# Patient Record
Sex: FEMALE | Race: WHITE | NOT HISPANIC OR LATINO | Employment: UNEMPLOYED | URBAN - METROPOLITAN AREA
[De-identification: names, ages, dates, MRNs, and addresses within clinical notes are randomized per-mention and may not be internally consistent; named-entity substitution may affect disease eponyms.]

---

## 2017-01-16 ENCOUNTER — TELEPHONE (OUTPATIENT)
Dept: FAMILY MEDICINE | Facility: CLINIC | Age: 27
End: 2017-01-16

## 2017-01-16 DIAGNOSIS — F41.8 DEPRESSION WITH ANXIETY: Primary | ICD-10-CM

## 2017-01-16 NOTE — TELEPHONE ENCOUNTER
FLUoxetine (PROZAC) 40 MG capsule     Last Written Prescription Date: 12/1/15  Last Fill Quantity: 90, # refills: 3  Last Office Visit with [facility] primary care provider:  12/1/15        Last PHQ-9 score on record=   PHQ-9 SCORE 10/13/2015   Total Score -   Total Score 10

## 2017-01-17 RX ORDER — FLUOXETINE 40 MG/1
40 CAPSULE ORAL DAILY
Qty: 30 CAPSULE | Refills: 0 | Status: ON HOLD | OUTPATIENT
Start: 2017-01-17 | End: 2024-05-29

## 2017-01-17 NOTE — TELEPHONE ENCOUNTER
Message left on home number for patient to call back TC line.  NTBS for physical/Pap and depression check with provider.    Page KINCAID

## 2017-01-17 NOTE — TELEPHONE ENCOUNTER
Annual exam advised in generic patient instructions at 12/1/15 visit.    Nothing scheduled, needs to be seen.    Routing refill request to provider for review/approval because:  Elevated PHQ9  [facility] RN refill protocol only allows RN refill for up to 6 mos from last related office visit.    Kori Funes, RN  [facility]

## 2017-01-24 NOTE — TELEPHONE ENCOUNTER
Spoke with patient and informed.  She will call back.  She got  in Sept and is waiting to get her insurance card (from 's employer) and will call to schedule then.

## 2024-05-29 ENCOUNTER — APPOINTMENT (OUTPATIENT)
Dept: GENERAL RADIOLOGY | Facility: CLINIC | Age: 34
DRG: 101 | End: 2024-05-29
Attending: EMERGENCY MEDICINE

## 2024-05-29 ENCOUNTER — APPOINTMENT (OUTPATIENT)
Dept: CT IMAGING | Facility: CLINIC | Age: 34
DRG: 101 | End: 2024-05-29
Attending: EMERGENCY MEDICINE

## 2024-05-29 ENCOUNTER — HOSPITAL ENCOUNTER (INPATIENT)
Facility: CLINIC | Age: 34
LOS: 2 days | Discharge: HOME | DRG: 101 | End: 2024-05-31
Attending: EMERGENCY MEDICINE | Admitting: HOSPITALIST

## 2024-05-29 DIAGNOSIS — R56.9 ALCOHOL WITHDRAWAL SEIZURE WITHOUT COMPLICATION (H): ICD-10-CM

## 2024-05-29 DIAGNOSIS — F10.930 ALCOHOL WITHDRAWAL SEIZURE WITHOUT COMPLICATION (H): ICD-10-CM

## 2024-05-29 LAB
ALBUMIN SERPL BCG-MCNC: 4.5 G/DL (ref 3.5–5.2)
ALP SERPL-CCNC: 91 U/L (ref 40–150)
ALT SERPL W P-5'-P-CCNC: 90 U/L (ref 0–50)
ANION GAP SERPL CALCULATED.3IONS-SCNC: 27 MMOL/L (ref 7–15)
AST SERPL W P-5'-P-CCNC: 207 U/L (ref 0–45)
ATRIAL RATE - MUSE: 85 BPM
BASOPHILS # BLD AUTO: 0 10E3/UL (ref 0–0.2)
BASOPHILS NFR BLD AUTO: 0 %
BILIRUB DIRECT SERPL-MCNC: 0.24 MG/DL (ref 0–0.3)
BILIRUB SERPL-MCNC: 1 MG/DL
BUN SERPL-MCNC: 12.2 MG/DL (ref 6–20)
CALCIUM SERPL-MCNC: 9.5 MG/DL (ref 8.6–10)
CHLORIDE SERPL-SCNC: 87 MMOL/L (ref 98–107)
CREAT SERPL-MCNC: 0.55 MG/DL (ref 0.51–0.95)
DEPRECATED HCO3 PLAS-SCNC: 18 MMOL/L (ref 22–29)
DIASTOLIC BLOOD PRESSURE - MUSE: NORMAL MMHG
EGFRCR SERPLBLD CKD-EPI 2021: >90 ML/MIN/1.73M2
EOSINOPHIL # BLD AUTO: 0 10E3/UL (ref 0–0.7)
EOSINOPHIL NFR BLD AUTO: 0 %
ERYTHROCYTE [DISTWIDTH] IN BLOOD BY AUTOMATED COUNT: 12.1 % (ref 10–15)
ETHANOL SERPL-MCNC: <0.01 G/DL
GLUCOSE BLDC GLUCOMTR-MCNC: 79 MG/DL (ref 70–99)
GLUCOSE BLDC GLUCOMTR-MCNC: 81 MG/DL (ref 70–99)
GLUCOSE BLDC GLUCOMTR-MCNC: 97 MG/DL (ref 70–99)
GLUCOSE SERPL-MCNC: 215 MG/DL (ref 70–99)
HCT VFR BLD AUTO: 42.4 % (ref 35–47)
HGB BLD-MCNC: 14.2 G/DL (ref 11.7–15.7)
IMM GRANULOCYTES # BLD: 0.1 10E3/UL
IMM GRANULOCYTES NFR BLD: 1 %
INTERPRETATION ECG - MUSE: NORMAL
LYMPHOCYTES # BLD AUTO: 0.6 10E3/UL (ref 0.8–5.3)
LYMPHOCYTES NFR BLD AUTO: 6 %
MAGNESIUM SERPL-MCNC: 1.7 MG/DL (ref 1.7–2.3)
MAGNESIUM SERPL-MCNC: 1.9 MG/DL (ref 1.7–2.3)
MCH RBC QN AUTO: 31.9 PG (ref 26.5–33)
MCHC RBC AUTO-ENTMCNC: 33.5 G/DL (ref 31.5–36.5)
MCV RBC AUTO: 95 FL (ref 78–100)
MONOCYTES # BLD AUTO: 0.5 10E3/UL (ref 0–1.3)
MONOCYTES NFR BLD AUTO: 5 %
NEUTROPHILS # BLD AUTO: 9.1 10E3/UL (ref 1.6–8.3)
NEUTROPHILS NFR BLD AUTO: 88 %
NRBC # BLD AUTO: 0 10E3/UL
NRBC BLD AUTO-RTO: 0 /100
P AXIS - MUSE: 70 DEGREES
PHOSPHATE SERPL-MCNC: 3 MG/DL (ref 2.5–4.5)
PHOSPHATE SERPL-MCNC: 4.2 MG/DL (ref 2.5–4.5)
PLATELET # BLD AUTO: 141 10E3/UL (ref 150–450)
POTASSIUM SERPL-SCNC: 3.1 MMOL/L (ref 3.4–5.3)
PR INTERVAL - MUSE: 130 MS
PROT SERPL-MCNC: 8 G/DL (ref 6.4–8.3)
QRS DURATION - MUSE: 86 MS
QT - MUSE: 408 MS
QTC - MUSE: 485 MS
R AXIS - MUSE: 71 DEGREES
RBC # BLD AUTO: 4.45 10E6/UL (ref 3.8–5.2)
SODIUM SERPL-SCNC: 132 MMOL/L (ref 135–145)
SYSTOLIC BLOOD PRESSURE - MUSE: NORMAL MMHG
T AXIS - MUSE: 51 DEGREES
TSH SERPL DL<=0.005 MIU/L-ACNC: 3.75 UIU/ML (ref 0.3–4.2)
VENTRICULAR RATE- MUSE: 85 BPM
WBC # BLD AUTO: 10.3 10E3/UL (ref 4–11)

## 2024-05-29 PROCEDURE — 83735 ASSAY OF MAGNESIUM: CPT | Performed by: EMERGENCY MEDICINE

## 2024-05-29 PROCEDURE — 99285 EMERGENCY DEPT VISIT HI MDM: CPT | Mod: 25

## 2024-05-29 PROCEDURE — 83735 ASSAY OF MAGNESIUM: CPT | Performed by: HOSPITALIST

## 2024-05-29 PROCEDURE — 84443 ASSAY THYROID STIM HORMONE: CPT | Performed by: EMERGENCY MEDICINE

## 2024-05-29 PROCEDURE — 70450 CT HEAD/BRAIN W/O DYE: CPT

## 2024-05-29 PROCEDURE — 250N000009 HC RX 250: Performed by: HOSPITALIST

## 2024-05-29 PROCEDURE — 36415 COLL VENOUS BLD VENIPUNCTURE: CPT | Performed by: EMERGENCY MEDICINE

## 2024-05-29 PROCEDURE — 250N000011 HC RX IP 250 OP 636: Performed by: EMERGENCY MEDICINE

## 2024-05-29 PROCEDURE — 96365 THER/PROPH/DIAG IV INF INIT: CPT

## 2024-05-29 PROCEDURE — 82247 BILIRUBIN TOTAL: CPT | Performed by: NURSE PRACTITIONER

## 2024-05-29 PROCEDURE — 250N000011 HC RX IP 250 OP 636: Performed by: HOSPITALIST

## 2024-05-29 PROCEDURE — 82077 ASSAY SPEC XCP UR&BREATH IA: CPT | Performed by: EMERGENCY MEDICINE

## 2024-05-29 PROCEDURE — 80069 RENAL FUNCTION PANEL: CPT | Performed by: EMERGENCY MEDICINE

## 2024-05-29 PROCEDURE — 93005 ELECTROCARDIOGRAM TRACING: CPT

## 2024-05-29 PROCEDURE — 250N000013 HC RX MED GY IP 250 OP 250 PS 637: Performed by: HOSPITALIST

## 2024-05-29 PROCEDURE — 73030 X-RAY EXAM OF SHOULDER: CPT | Mod: RT

## 2024-05-29 PROCEDURE — 84100 ASSAY OF PHOSPHORUS: CPT | Performed by: EMERGENCY MEDICINE

## 2024-05-29 PROCEDURE — 36415 COLL VENOUS BLD VENIPUNCTURE: CPT | Performed by: HOSPITALIST

## 2024-05-29 PROCEDURE — 200N000001 HC R&B ICU

## 2024-05-29 PROCEDURE — 99222 1ST HOSP IP/OBS MODERATE 55: CPT | Performed by: HOSPITALIST

## 2024-05-29 PROCEDURE — 85025 COMPLETE CBC W/AUTO DIFF WBC: CPT | Performed by: EMERGENCY MEDICINE

## 2024-05-29 PROCEDURE — 258N000003 HC RX IP 258 OP 636: Performed by: EMERGENCY MEDICINE

## 2024-05-29 PROCEDURE — 96367 TX/PROPH/DG ADDL SEQ IV INF: CPT

## 2024-05-29 PROCEDURE — 250N000013 HC RX MED GY IP 250 OP 250 PS 637: Performed by: EMERGENCY MEDICINE

## 2024-05-29 PROCEDURE — 96375 TX/PRO/DX INJ NEW DRUG ADDON: CPT

## 2024-05-29 PROCEDURE — 258N000003 HC RX IP 258 OP 636: Performed by: HOSPITALIST

## 2024-05-29 PROCEDURE — 84100 ASSAY OF PHOSPHORUS: CPT | Performed by: HOSPITALIST

## 2024-05-29 RX ORDER — TRAZODONE HYDROCHLORIDE 50 MG/1
50-100 TABLET, FILM COATED ORAL
COMMUNITY

## 2024-05-29 RX ORDER — DEXTROSE MONOHYDRATE, SODIUM CHLORIDE, AND POTASSIUM CHLORIDE 50; 1.49; 9 G/1000ML; G/1000ML; G/1000ML
INJECTION, SOLUTION INTRAVENOUS CONTINUOUS
Status: DISCONTINUED | OUTPATIENT
Start: 2024-05-29 | End: 2024-05-31

## 2024-05-29 RX ORDER — FOLIC ACID 1 MG/1
1 TABLET ORAL DAILY
Status: DISCONTINUED | OUTPATIENT
Start: 2024-05-30 | End: 2024-05-31 | Stop reason: HOSPADM

## 2024-05-29 RX ORDER — GABAPENTIN 300 MG/1
600 CAPSULE ORAL EVERY 8 HOURS
Status: DISCONTINUED | OUTPATIENT
Start: 2024-06-01 | End: 2024-05-30

## 2024-05-29 RX ORDER — GABAPENTIN 300 MG/1
300 CAPSULE ORAL EVERY 8 HOURS
Status: DISCONTINUED | OUTPATIENT
Start: 2024-06-03 | End: 2024-05-30

## 2024-05-29 RX ORDER — LORAZEPAM 2 MG/ML
INJECTION INTRAMUSCULAR
Status: DISCONTINUED
Start: 2024-05-29 | End: 2024-05-29 | Stop reason: WASHOUT

## 2024-05-29 RX ORDER — FLUMAZENIL 0.1 MG/ML
0.2 INJECTION, SOLUTION INTRAVENOUS
Status: DISCONTINUED | OUTPATIENT
Start: 2024-05-29 | End: 2024-05-31 | Stop reason: HOSPADM

## 2024-05-29 RX ORDER — GABAPENTIN 100 MG/1
100 CAPSULE ORAL EVERY 8 HOURS
Status: DISCONTINUED | OUTPATIENT
Start: 2024-06-05 | End: 2024-05-30

## 2024-05-29 RX ORDER — LORAZEPAM 1 MG/1
1-2 TABLET ORAL EVERY 30 MIN PRN
Status: DISCONTINUED | OUTPATIENT
Start: 2024-05-29 | End: 2024-05-31 | Stop reason: HOSPADM

## 2024-05-29 RX ORDER — ONDANSETRON 2 MG/ML
4 INJECTION INTRAMUSCULAR; INTRAVENOUS EVERY 6 HOURS PRN
Status: DISCONTINUED | OUTPATIENT
Start: 2024-05-29 | End: 2024-05-31 | Stop reason: HOSPADM

## 2024-05-29 RX ORDER — OLANZAPINE 5 MG/1
5-10 TABLET, ORALLY DISINTEGRATING ORAL EVERY 6 HOURS PRN
Status: DISCONTINUED | OUTPATIENT
Start: 2024-05-29 | End: 2024-05-31 | Stop reason: HOSPADM

## 2024-05-29 RX ORDER — CLONIDINE HYDROCHLORIDE 0.1 MG/1
0.1 TABLET ORAL EVERY 8 HOURS
Status: DISCONTINUED | OUTPATIENT
Start: 2024-05-29 | End: 2024-05-29

## 2024-05-29 RX ORDER — OLANZAPINE 5 MG/1
5-10 TABLET, ORALLY DISINTEGRATING ORAL EVERY 6 HOURS PRN
Status: DISCONTINUED | OUTPATIENT
Start: 2024-05-29 | End: 2024-05-29

## 2024-05-29 RX ORDER — LEVOTHYROXINE SODIUM 75 UG/1
75 TABLET ORAL DAILY
Status: DISCONTINUED | OUTPATIENT
Start: 2024-05-29 | End: 2024-05-31 | Stop reason: HOSPADM

## 2024-05-29 RX ORDER — LORAZEPAM 2 MG/ML
1-2 INJECTION INTRAMUSCULAR EVERY 30 MIN PRN
Status: DISCONTINUED | OUTPATIENT
Start: 2024-05-29 | End: 2024-05-31 | Stop reason: HOSPADM

## 2024-05-29 RX ORDER — ALBUTEROL SULFATE 90 UG/1
2 AEROSOL, METERED RESPIRATORY (INHALATION) EVERY 6 HOURS PRN
COMMUNITY

## 2024-05-29 RX ORDER — DIAZEPAM 10 MG/2ML
5-10 INJECTION, SOLUTION INTRAMUSCULAR; INTRAVENOUS EVERY 30 MIN PRN
Status: DISCONTINUED | OUTPATIENT
Start: 2024-05-29 | End: 2024-05-29

## 2024-05-29 RX ORDER — GABAPENTIN 600 MG/1
1200 TABLET ORAL ONCE
Status: COMPLETED | OUTPATIENT
Start: 2024-05-29 | End: 2024-05-29

## 2024-05-29 RX ORDER — CLONIDINE HYDROCHLORIDE 0.1 MG/1
0.1 TABLET ORAL EVERY 8 HOURS
Status: DISCONTINUED | OUTPATIENT
Start: 2024-05-29 | End: 2024-05-30

## 2024-05-29 RX ORDER — HALOPERIDOL 5 MG/ML
2.5-5 INJECTION INTRAMUSCULAR EVERY 6 HOURS PRN
Status: DISCONTINUED | OUTPATIENT
Start: 2024-05-29 | End: 2024-05-29

## 2024-05-29 RX ORDER — GABAPENTIN 300 MG/1
900 CAPSULE ORAL EVERY 8 HOURS
Status: DISCONTINUED | OUTPATIENT
Start: 2024-05-29 | End: 2024-05-29

## 2024-05-29 RX ORDER — NICOTINE POLACRILEX 4 MG
15-30 LOZENGE BUCCAL
Status: DISCONTINUED | OUTPATIENT
Start: 2024-05-29 | End: 2024-05-31 | Stop reason: HOSPADM

## 2024-05-29 RX ORDER — LEVETIRACETAM 10 MG/ML
1000 INJECTION INTRAVASCULAR ONCE
Status: COMPLETED | OUTPATIENT
Start: 2024-05-29 | End: 2024-05-29

## 2024-05-29 RX ORDER — FOLIC ACID 1 MG/1
1 TABLET ORAL DAILY
Status: DISCONTINUED | OUTPATIENT
Start: 2024-05-29 | End: 2024-05-29

## 2024-05-29 RX ORDER — FLUMAZENIL 0.1 MG/ML
0.2 INJECTION, SOLUTION INTRAVENOUS
Status: DISCONTINUED | OUTPATIENT
Start: 2024-05-29 | End: 2024-05-29

## 2024-05-29 RX ORDER — ONDANSETRON 4 MG/1
4 TABLET, ORALLY DISINTEGRATING ORAL EVERY 6 HOURS PRN
Status: DISCONTINUED | OUTPATIENT
Start: 2024-05-29 | End: 2024-05-31 | Stop reason: HOSPADM

## 2024-05-29 RX ORDER — DIAZEPAM 10 MG
10 TABLET ORAL EVERY 30 MIN PRN
Status: DISCONTINUED | OUTPATIENT
Start: 2024-05-29 | End: 2024-05-29

## 2024-05-29 RX ORDER — GABAPENTIN 300 MG/1
900 CAPSULE ORAL EVERY 8 HOURS
Status: DISCONTINUED | OUTPATIENT
Start: 2024-05-29 | End: 2024-05-30

## 2024-05-29 RX ORDER — GABAPENTIN 100 MG/1
100 CAPSULE ORAL EVERY 8 HOURS
Status: DISCONTINUED | OUTPATIENT
Start: 2024-06-05 | End: 2024-05-29

## 2024-05-29 RX ORDER — POTASSIUM CHLORIDE 7.45 MG/ML
10 INJECTION INTRAVENOUS ONCE
Status: COMPLETED | OUTPATIENT
Start: 2024-05-29 | End: 2024-05-29

## 2024-05-29 RX ORDER — DEXTROSE MONOHYDRATE 25 G/50ML
25-50 INJECTION, SOLUTION INTRAVENOUS
Status: DISCONTINUED | OUTPATIENT
Start: 2024-05-29 | End: 2024-05-31 | Stop reason: HOSPADM

## 2024-05-29 RX ORDER — LEVOTHYROXINE SODIUM 75 UG/1
75 TABLET ORAL DAILY
COMMUNITY

## 2024-05-29 RX ORDER — GABAPENTIN 300 MG/1
600 CAPSULE ORAL EVERY 8 HOURS
Status: DISCONTINUED | OUTPATIENT
Start: 2024-06-01 | End: 2024-05-29

## 2024-05-29 RX ORDER — HALOPERIDOL 5 MG/ML
2.5-5 INJECTION INTRAMUSCULAR EVERY 6 HOURS PRN
Status: DISCONTINUED | OUTPATIENT
Start: 2024-05-29 | End: 2024-05-31 | Stop reason: HOSPADM

## 2024-05-29 RX ORDER — GABAPENTIN 300 MG/1
300 CAPSULE ORAL EVERY 8 HOURS
Status: DISCONTINUED | OUTPATIENT
Start: 2024-06-03 | End: 2024-05-29

## 2024-05-29 RX ADMIN — CLONIDINE HYDROCHLORIDE 0.1 MG: 0.1 TABLET ORAL at 13:57

## 2024-05-29 RX ADMIN — GABAPENTIN 900 MG: 300 CAPSULE ORAL at 23:16

## 2024-05-29 RX ADMIN — FOLIC ACID: 5 INJECTION, SOLUTION INTRAMUSCULAR; INTRAVENOUS; SUBCUTANEOUS at 14:56

## 2024-05-29 RX ADMIN — LEVOTHYROXINE SODIUM 75 MCG: 0.07 TABLET ORAL at 16:47

## 2024-05-29 RX ADMIN — POTASSIUM CHLORIDE 10 MEQ: 7.46 INJECTION, SOLUTION INTRAVENOUS at 07:39

## 2024-05-29 RX ADMIN — CLONIDINE HYDROCHLORIDE 0.1 MG: 0.1 TABLET ORAL at 07:39

## 2024-05-29 RX ADMIN — GABAPENTIN 1200 MG: 600 TABLET, FILM COATED ORAL at 06:45

## 2024-05-29 RX ADMIN — CLONIDINE HYDROCHLORIDE 0.1 MG: 0.1 TABLET ORAL at 22:54

## 2024-05-29 RX ADMIN — FOLIC ACID 1 MG: 1 TABLET ORAL at 07:39

## 2024-05-29 RX ADMIN — LEVETIRACETAM 1000 MG: 10 INJECTION INTRAVENOUS at 06:50

## 2024-05-29 RX ADMIN — LORAZEPAM 1 MG: 1 TABLET ORAL at 23:16

## 2024-05-29 RX ADMIN — THIAMINE HCL TAB 100 MG 100 MG: 100 TAB at 07:39

## 2024-05-29 RX ADMIN — DIAZEPAM 5 MG: 5 INJECTION INTRAMUSCULAR; INTRAVENOUS at 06:46

## 2024-05-29 RX ADMIN — GABAPENTIN 900 MG: 300 CAPSULE ORAL at 15:54

## 2024-05-29 RX ADMIN — SERTRALINE HYDROCHLORIDE 50 MG: 50 TABLET ORAL at 16:47

## 2024-05-29 RX ADMIN — SODIUM CHLORIDE 1000 ML: 9 INJECTION, SOLUTION INTRAVENOUS at 06:39

## 2024-05-29 ASSESSMENT — LIFESTYLE VARIABLES
TREMOR: NO TREMOR
NAUSEA AND VOMITING: NO NAUSEA AND NO VOMITING
TOTAL SCORE: 3
VISUAL DISTURBANCES: NOT PRESENT
ANXIETY: NO ANXIETY, AT EASE
NAUSEA AND VOMITING: NO NAUSEA AND NO VOMITING
VISUAL DISTURBANCES: NOT PRESENT
AUDITORY DISTURBANCES: NOT PRESENT
ORIENTATION AND CLOUDING OF SENSORIUM: CANNOT DO SERIAL ADDITIONS OR IS UNCERTAIN ABOUT DATE
VISUAL DISTURBANCES: NOT PRESENT
ORIENTATION AND CLOUDING OF SENSORIUM: CANNOT DO SERIAL ADDITIONS OR IS UNCERTAIN ABOUT DATE
AUDITORY DISTURBANCES: NOT PRESENT
NAUSEA AND VOMITING: NO NAUSEA AND NO VOMITING
ANXIETY: NO ANXIETY, AT EASE
NAUSEA AND VOMITING: NO NAUSEA AND NO VOMITING
AUDITORY DISTURBANCES: NOT PRESENT
VISUAL DISTURBANCES: NOT PRESENT
PAROXYSMAL SWEATS: NO SWEAT VISIBLE
PAROXYSMAL SWEATS: NO SWEAT VISIBLE
TREMOR: NOT VISIBLE, BUT CAN BE FELT FINGERTIP TO FINGERTIP
TOTAL SCORE: 2
AUDITORY DISTURBANCES: NOT PRESENT
VISUAL DISTURBANCES: NOT PRESENT
PAROXYSMAL SWEATS: NO SWEAT VISIBLE
ANXIETY: NO ANXIETY, AT EASE
AGITATION: NORMAL ACTIVITY
TREMOR: 2
HEADACHE, FULLNESS IN HEAD: NOT PRESENT
ANXIETY: NO ANXIETY, AT EASE
PAROXYSMAL SWEATS: NO SWEAT VISIBLE
VISUAL DISTURBANCES: NOT PRESENT
HEADACHE, FULLNESS IN HEAD: NOT PRESENT
TOTAL SCORE: 2
VISUAL DISTURBANCES: NOT PRESENT
PAROXYSMAL SWEATS: NO SWEAT VISIBLE
TOTAL SCORE: 2
AUDITORY DISTURBANCES: NOT PRESENT
AGITATION: NORMAL ACTIVITY
ORIENTATION AND CLOUDING OF SENSORIUM: CANNOT DO SERIAL ADDITIONS OR IS UNCERTAIN ABOUT DATE
PAROXYSMAL SWEATS: NO SWEAT VISIBLE
NAUSEA AND VOMITING: NO NAUSEA AND NO VOMITING
TREMOR: NOT VISIBLE, BUT CAN BE FELT FINGERTIP TO FINGERTIP
HEADACHE, FULLNESS IN HEAD: NOT PRESENT
ANXIETY: NO ANXIETY, AT EASE
AUDITORY DISTURBANCES: NOT PRESENT
TOTAL SCORE: 2
AGITATION: NORMAL ACTIVITY
AGITATION: NORMAL ACTIVITY
TOTAL SCORE: 5
NAUSEA AND VOMITING: NO NAUSEA AND NO VOMITING
TREMOR: 2
TREMOR: NOT VISIBLE, BUT CAN BE FELT FINGERTIP TO FINGERTIP
VISUAL DISTURBANCES: NOT PRESENT
PAROXYSMAL SWEATS: NO SWEAT VISIBLE
ANXIETY: NO ANXIETY, AT EASE
AGITATION: NORMAL ACTIVITY
ORIENTATION AND CLOUDING OF SENSORIUM: ORIENTED AND CAN DO SERIAL ADDITIONS
ORIENTATION AND CLOUDING OF SENSORIUM: CANNOT DO SERIAL ADDITIONS OR IS UNCERTAIN ABOUT DATE
AGITATION: NORMAL ACTIVITY
ORIENTATION AND CLOUDING OF SENSORIUM: CANNOT DO SERIAL ADDITIONS OR IS UNCERTAIN ABOUT DATE
TREMOR: NOT VISIBLE, BUT CAN BE FELT FINGERTIP TO FINGERTIP
AGITATION: NORMAL ACTIVITY
HEADACHE, FULLNESS IN HEAD: NOT PRESENT
ORIENTATION AND CLOUDING OF SENSORIUM: CANNOT DO SERIAL ADDITIONS OR IS UNCERTAIN ABOUT DATE
ORIENTATION AND CLOUDING OF SENSORIUM: CANNOT DO SERIAL ADDITIONS OR IS UNCERTAIN ABOUT DATE
VISUAL DISTURBANCES: NOT PRESENT
ORIENTATION AND CLOUDING OF SENSORIUM: CANNOT DO SERIAL ADDITIONS OR IS UNCERTAIN ABOUT DATE
AUDITORY DISTURBANCES: NOT PRESENT
TREMOR: 2
NAUSEA AND VOMITING: NO NAUSEA AND NO VOMITING
TREMOR: NOT VISIBLE, BUT CAN BE FELT FINGERTIP TO FINGERTIP
HEADACHE, FULLNESS IN HEAD: NOT PRESENT
NAUSEA AND VOMITING: NO NAUSEA AND NO VOMITING
PAROXYSMAL SWEATS: NO SWEAT VISIBLE
TOTAL SCORE: 2
AGITATION: NORMAL ACTIVITY
NAUSEA AND VOMITING: NO NAUSEA AND NO VOMITING
VISUAL DISTURBANCES: NOT PRESENT
AGITATION: NORMAL ACTIVITY
HEADACHE, FULLNESS IN HEAD: NOT PRESENT
NAUSEA AND VOMITING: NO NAUSEA AND NO VOMITING
AUDITORY DISTURBANCES: NOT PRESENT
ANXIETY: NO ANXIETY, AT EASE
AUDITORY DISTURBANCES: NOT PRESENT
ANXIETY: NO ANXIETY, AT EASE
ANXIETY: NO ANXIETY, AT EASE
TOTAL SCORE: 3
HEADACHE, FULLNESS IN HEAD: NOT PRESENT
ORIENTATION AND CLOUDING OF SENSORIUM: CANNOT DO SERIAL ADDITIONS OR IS UNCERTAIN ABOUT DATE
AUDITORY DISTURBANCES: MODERATE HARSHNESS OR ABILITY TO FRIGHTEN
TOTAL SCORE: 2
PAROXYSMAL SWEATS: NO SWEAT VISIBLE
ANXIETY: NO ANXIETY, AT EASE
PAROXYSMAL SWEATS: NO SWEAT VISIBLE
TOTAL SCORE: 1
AGITATION: NORMAL ACTIVITY
TREMOR: NOT VISIBLE, BUT CAN BE FELT FINGERTIP TO FINGERTIP
HEADACHE, FULLNESS IN HEAD: NOT PRESENT

## 2024-05-29 ASSESSMENT — ACTIVITIES OF DAILY LIVING (ADL)
ADLS_ACUITY_SCORE: 34
ADLS_ACUITY_SCORE: 35
ADLS_ACUITY_SCORE: 35
ADLS_ACUITY_SCORE: 20
ADLS_ACUITY_SCORE: 34
ADLS_ACUITY_SCORE: 35
ADLS_ACUITY_SCORE: 34
ADLS_ACUITY_SCORE: 35
ADLS_ACUITY_SCORE: 35
ADLS_ACUITY_SCORE: 20
ADLS_ACUITY_SCORE: 20
ADLS_ACUITY_SCORE: 34
ADLS_ACUITY_SCORE: 35
ADLS_ACUITY_SCORE: 33

## 2024-05-29 NOTE — ED TRIAGE NOTES
Patient arrived and required help out of car and onto emergency room bed. Patient had seizure this morning 0530 and another on the way to the emergency room around 0610. Patient appears post ictal. Blood noted in nostrils. Bruises on right shoulder and elbow noted. Patient reports no alcohol this morning.      Triage Assessment (Adult)       Row Name 05/29/24 0621          Triage Assessment    Airway WDL WDL        Respiratory WDL    Respiratory WDL WDL        Skin Circulation/Temperature WDL    Skin Circulation/Temperature WDL X  right shoulder and right elbow bruise        Cardiac WDL    Cardiac WDL WDL     Cardiac Rhythm NSR        Peripheral/Neurovascular WDL    Peripheral Neurovascular WDL WDL        Cognitive/Neuro/Behavioral WDL    Cognitive/Neuro/Behavioral WDL X  Patient oriented but appears post ictal.

## 2024-05-29 NOTE — H&P
[facility]    History and Physical  Hospitalist       Date of Admission:  5/29/2024    Assessment & Plan   Carley Vazquez is a 33 year old female with a past medical history of anxiety/depression who presents with multiple seizures.    #Seizures x3.  Alcohol use disorder with withdrawal: Patient's brother was called by his other sibling over the weekend stating that Carley was not doing well.  She is known to have increased her drinking as of late.  She reportedly got  last year and there were some increased stressors related to this issue.  Patient's brother went to see the patient on Monday, 5/27.  She was reportedly intoxicated.  Brother notes that she had multiple bottles with vodka in it.  She came with him to his house.  He reports that he was tremulous through the day on Tuesday.  Patient was seen at around 5 AM and having seizure activity.  Brother describes it as her being stiff.  Lasted about 3 minutes.  She did come to and at that point was agreeable to be brought to the ER.  En route to the ER, patient reportedly had another seizure that lasted again 3 minutes.  She was somnolent by the time she arrived in the ER.  When she was in the ER she did start to talk a bit but then had another seizure.  Patient denies any current pain.  She tells me she is feeling better.  No chest pain or shortness of breath.  No recent illness.  She only tells me that she drinks 1-2 drinks per day.  -ER, patient afebrile nontachycardic.  Normotensive.  Saturating okay on room air.  Her CBC showed mild thrombocytopenia.  BMP with mild hyponatremia and hypokalemia.  LFTs elevated in a manner consistent with alcohol use.  EKG done that did show sinus rhythm with QTc 485.  CT head without acute intracranial abnormality.  X-ray of the shoulder was also done negative for fracture.    Patient was given 1 L of IV fluid, 1 g of IV Keppra along with 10 mg of IV Valium.  He was also given 1200 mg of  gabapentin.  -We will admit to ICU given seizures x 3 this AM for closer clinical monitoring.  -Treat for alcohol withdrawal with gabapentin taper, clonidine with hold parameters as pt HTN, tachycardic.  Thiamine and folic acid.  Prn lorazepam.  -Pt received keppra IV in the ED.  Will hold further doses of keppra as suspect this is more related to alcohol use.  Will consult neurology for opinion.    #Thrombocytopenia secondary to alcohol use: Monitor  #Elevation of LFT's secondary to alcohol use: Monitor. Cessation advised.   #Hyponatremia, hypokalemia: IVF with replacement  #Hypothyroidism: Continue home LT4 once verified.   #Anxiety: Continue home fluoxetine once verified    DVT Prophylaxis: Pneumatic Compression Devices  Code Status: Full Code  Dispo: Admit to ICU    Tim Marquez MD    Primary Care Physician   David Palacios    Chief Complaint   Seizures.     History is obtained from the patient, patient's brother, patient's chart and discussed with ER physician    History of Present Illness   Carley Vazquez is a 33 year old female with a past medical history of anxiety/depression who presents with multiple seizures.    Patient's brother was called by his other sibling over the weekend stating that Carley was not doing well.  She is known to have increased her drinking as of late.  She reportedly got  last year and there were some increased stressors related to this issue.  Patient's brother went to see the patient on Monday, 5/27.  She was reportedly intoxicated.  Brother notes that she had multiple bottles with vodka in it.  She came with him to his house.  He reports that he was tremulous through the day on Tuesday.  Patient was seen at around 5 AM and having seizure activity.  Brother describes it as her being stiff.  Lasted about 3 minutes.  She did come to and at that point was agreeable to be brought to the ER.  En route to the ER, patient reportedly had another seizure that lasted again 3  minutes.  She was somnolent by the time she arrived in the ER.  When she was in the ER she did start to talk a bit but then had another seizure.    In the ER, patient afebrile nontachycardic.  Normotensive.  Saturating okay on room air.  Her CBC showed mild thrombocytopenia.  BMP with mild hyponatremia and hypokalemia.  LFTs elevated in a manner consistent with alcohol use.  EKG done that did show sinus rhythm with QTc 485.  CT head without acute intracranial abnormality.  X-ray of the shoulder was also done negative for fracture.    Patient was given 1 L of IV fluid, 1 g of IV Keppra along with 10 mg of IV Valium.  He was also given 1200 mg of gabapentin.    Past Medical History    I have reviewed this patient's medical history and updated it with pertinent information if needed.   Past Medical History:   Diagnosis Date    NO ACTIVE PROBLEMS        Past Surgical History   I have reviewed this patient's surgical history and updated it with pertinent information if needed.  Past Surgical History:   Procedure Laterality Date    APPENDECTOMY OPEN      in 2nd grade    wisdom teeth  2009       Prior to Admission Medications   Prior to Admission Medications   Prescriptions Last Dose Informant Patient Reported? Taking?   ALPRAZolam (XANAX) 0.25 MG tablet   No No   Sig: Take 1 tablet (0.25 mg) by mouth 3 times daily as needed for anxiety   FLUoxetine (PROZAC) 40 MG capsule   No No   Sig: Take 1 capsule (40 mg) by mouth daily   azithromycin (ZITHROMAX) 250 MG tablet   No No   Sig: Two tablets first day, then one tablet daily for four days.   levonorgestrel-ethinyl estradiol (AVIANE,ALESSE,LESSINA) 0.1-20 MG-MCG per tablet   No No   Sig: Take 1 tablet by mouth daily 11/15/16: needs office visit for refills      Facility-Administered Medications: None     Allergies   Allergies   Allergen Reactions    Bromine      rash    Nickel      rash       Social History   I have reviewed this patient's social history and updated it with  pertinent information if needed. Carley Vazquez  reports that she has never smoked. She has never used smokeless tobacco. She reports current alcohol use. She reports that she does not use drugs.    Family History   I have reviewed this patient's family history and updated it with pertinent information if needed.   Family History   Problem Relation Age of Onset    Respiratory Mother         emphysema    Diabetes No family hx of     Breast Cancer No family hx of     Cancer - colorectal No family hx of        Review of Systems   The 10 point Review of Systems is negative other than noted in the HPI or here.     Physical Exam   Temp: 98.5  F (36.9  C) Temp src: Oral BP: (!) 151/106 Pulse: 101   Resp: 20 SpO2: 94 % O2 Device: None (Room air)    Vital Signs with Ranges  Temp:  [98.5  F (36.9  C)] 98.5  F (36.9  C)  Pulse:  [101] 101  Resp:  [20] 20  BP: (151)/(106) 151/106  SpO2:  [94 %] 94 %  130 lbs 0 oz    Constitutional: Somnolent but arouses to voice.  She is able to answer simple yes/no questions.    HEENT: Normocephalic, membranes are dry, no elevation of JVD.  Respiratory: Nl WOB, Clear bilaterally, No wheezes, no crackles  Cardiovascular: Regular, no murmur  GI: BS+, NT, ND, no rebound or guarding  Lymph/Hematologic: Scattered bruising noted.  No cervical LAD  Skin: No rash  Musculoskeletal: Nl Tone, No edema noted  Neurologic: Somnolent but awakens to voice.  Cranial nerves are all intact.  She moves all extremities.  She does not have active tremors at the moment.  Psychiatric: Calm    Data   Data reviewed today:  I personally reviewed   Recent Labs   Lab 05/29/24  0631   WBC 10.3   HGB 14.2   MCV 95   *   *   POTASSIUM 3.1*   CHLORIDE 87*   CO2 18*   BUN 12.2   CR 0.55   ANIONGAP 27*   MAHSA 9.5   *       No results found for this or any previous visit (from the past 24 hour(s)).    Clinically Significant Risk Factors Present on Admission        # Hypokalemia: Lowest K = 3.1 mmol/L in last  2 days, will replace as needed      # Anion Gap Metabolic Acidosis: Highest Anion Gap = 27 mmol/L in last 2 days, will monitor and treat as appropriate

## 2024-05-29 NOTE — ED PROVIDER NOTES
Emergency Department Note      History of Present Illness     Chief Complaint:  Seizures     HPI   Carley Vazquez is a 33 year old female with a history of alcohol abuse presents to the ED for seizures. The patient claims she last drank alcohol 3 days ago (5/26/24), when the patient's family found out her history of alcohol abuse and took her home with them. She began to go through symptoms of alcohol withdrawal. Her brother states she was shaking but coherent and in a good mood, but had a lack of appetite. This morning the patient had 1 seizure, and then another in the car on the way to the ED, which lasted 3 minutes long. The patient's body stiffened during the seizure. The patient vomited following the seizure and had a bloody nose, but denies trauma to the nose. The patient denies any recent fevers. The patient takes sertraline, levothyroxine, albuterol, and birth control. Of note, the patient mentioned to the family last night (5/28/24) that she had a headache, but denied having one this morning when asked by her brother.     Independent Historian:    Brother present at bedside to provide partial history.     Review of External Notes  None    Past Medical History   Medical History, Surgical History, Problem List, and Medications  Reviewed in Epic    Physical Exam   Patient Vitals for the past 24 hrs:   BP Temp Temp src Pulse Resp SpO2 Height Weight   05/29/24 1130 119/80 -- -- 85 13 -- -- --   05/29/24 1115 115/79 -- -- 82 13 -- -- --   05/29/24 1036 118/77 -- -- 89 -- -- -- --   05/29/24 1021 119/73 -- -- 89 -- -- -- --   05/29/24 1006 120/73 -- -- 87 -- -- -- --   05/29/24 0944 119/63 -- -- 84 16 -- -- --   05/29/24 0929 118/71 -- -- 81 11 -- -- --   05/29/24 0914 134/82 -- -- 83 16 96 % -- --   05/29/24 0859 136/83 -- -- 73 17 95 % -- --   05/29/24 0844 (!) 129/90 -- -- 93 13 97 % -- --   05/29/24 0829 139/85 -- -- 77 19 97 % -- --   05/29/24 0752 (!) 143/89 -- -- -- -- -- -- --   05/29/24 0737 (!) 142/92  "-- -- -- -- 99 % -- --   05/29/24 0722 (!) 143/109 -- -- 111 17 93 % -- --   05/29/24 0707 126/68 -- -- 84 17 100 % -- --   05/29/24 0652 (!) 154/111 -- -- 87 25 97 % -- --   05/29/24 0627 (!) 151/106 98.5  F (36.9  C) Oral 101 20 94 % 1.626 m (5' 4\") 59 kg (130 lb)       Physical Exam  Constitutional: Vital signs reviewed as above.   Eyes: PEERL, EOMI B/L  Nose: Dried blood in both nostrils  Neck: No JVD noted. FROM   Cardiovascular: tachycardic rate, Regular rhythm and normal heart sounds.  No murmur heard. Equal B/L peripheral pulses.  Pulmonary/Chest: Effort normal and breath sounds normal. No respiratory distress. Patient has no wheezes. Patient has no rales.   Gastrointestinal: Soft. There is no tenderness.   Musculoskeletal/Extremities: No pitting edema noted. Normal tone.  Neurological: Alert, somewhat post ictal  Skin: Skin is warm and dry. There is no diaphoresis noted.   Psychiatric: The patient appears calm.     Diagnostics     Laboratory: Imaging:   Labs Ordered and Resulted from Time of ED Arrival to Time of ED Departure   BASIC METABOLIC PANEL - Abnormal       Result Value    Sodium 132 (*)     Potassium 3.1 (*)     Chloride 87 (*)     Carbon Dioxide (CO2) 18 (*)     Anion Gap 27 (*)     Urea Nitrogen 12.2      Creatinine 0.55      GFR Estimate >90      Calcium 9.5      Glucose 215 (*)    CBC WITH PLATELETS AND DIFFERENTIAL - Abnormal    WBC Count 10.3      RBC Count 4.45      Hemoglobin 14.2      Hematocrit 42.4      MCV 95      MCH 31.9      MCHC 33.5      RDW 12.1      Platelet Count 141 (*)     % Neutrophils 88      % Lymphocytes 6      % Monocytes 5      % Eosinophils 0      % Basophils 0      % Immature Granulocytes 1      NRBCs per 100 WBC 0      Absolute Neutrophils 9.1 (*)     Absolute Lymphocytes 0.6 (*)     Absolute Monocytes 0.5      Absolute Eosinophils 0.0      Absolute Basophils 0.0      Absolute Immature Granulocytes 0.1      Absolute NRBCs 0.0     HEPATIC FUNCTION PANEL - Abnormal    " Protein Total 8.0      Albumin 4.5      Bilirubin Total 1.0      Alkaline Phosphatase 91       (*)     ALT 90 (*)     Bilirubin Direct 0.24     MAGNESIUM - Normal    Magnesium 1.7     PHOSPHORUS - Normal    Phosphorus 3.0     TSH WITH FREE T4 REFLEX - Normal    TSH 3.75     ETHYL ALCOHOL LEVEL - Normal    Alcohol ethyl <0.01       XR Shoulder Right G/E 3 Views   Final Result   IMPRESSION: The right glenohumeral and acromioclavicular joints are   negative for fracture or dislocation.      EAMON MORATAYA MD            SYSTEM ID:  [id number]      Head CT w/o contrast   Final Result   IMPRESSION: No acute intracranial pathology.       KYREE SILVA MD            SYSTEM ID:  [id number]            EKG   ECG taken at 0642, ECG read at 0644  Normal sinus rhythm  Possible Left atrial enlargement  Nonspecific ST and T wave abnormality   Prolonged QT  Abnormal ECG   Rate 85 bpm. WY interval 130 ms. QRS duration 86 ms. QT/QTc 408/485 ms. P-R-T axes 70 71 51.    Independent Interpretation  See ED course    ED Course    Medications Administered  Medications   glucose gel 15-30 g (has no administration in time range)     Or   dextrose 50 % injection 25-50 mL (has no administration in time range)     Or   glucagon injection 1 mg (has no administration in time range)   ondansetron (ZOFRAN ODT) ODT tab 4 mg (has no administration in time range)     Or   ondansetron (ZOFRAN) injection 4 mg (has no administration in time range)   cloNIDine (CATAPRES) tablet 0.1 mg (0.1 mg Oral $Given 5/29/24 0082)   OLANZapine zydis (zyPREXA) ODT tab 5-10 mg (has no administration in time range)     Or   haloperidol lactate (HALDOL) injection 2.5-5 mg (has no administration in time range)   flumazenil (ROMAZICON) injection 0.2 mg (has no administration in time range)   melatonin tablet 5 mg (has no administration in time range)   gabapentin (NEURONTIN) capsule 900 mg (has no administration in time range)   gabapentin (NEURONTIN) capsule 600 mg  (has no administration in time range)   gabapentin (NEURONTIN) capsule 300 mg (has no administration in time range)   gabapentin (NEURONTIN) capsule 100 mg (has no administration in time range)   LORazepam (ATIVAN) tablet 1-2 mg (has no administration in time range)     Or   LORazepam (ATIVAN) injection 1-2 mg (has no administration in time range)   sodium chloride 0.9 % 1,000 mL with Infuvite Adult 10 mL, thiamine 100 mg, folic acid 1 mg infusion (has no administration in time range)   thiamine (B-1) tablet 100 mg (has no administration in time range)   folic acid (FOLVITE) tablet 1 mg (has no administration in time range)   dextrose 5% and 0.9% NaCl + KCL 20 mEq/L infusion (has no administration in time range)   sodium chloride 0.9% BOLUS 1,000 mL (0 mLs Intravenous Stopped 5/29/24 0950)   levETIRAcetam (KEPPRA) intermittent infusion 1,000 mg (0 mg Intravenous Stopped 5/29/24 0710)   gabapentin (NEURONTIN) tablet 1,200 mg (1,200 mg Oral $Given 5/29/24 0645)   potassium chloride 10 mEq in 100 mL sterile water infusion (0 mEq Intravenous Stopped 5/29/24 0950)       Procedures  Procedures     Discussion of Management  See ED Course    Social Determinants of Health adding to complexity of care  None    ED Course  ED Course as of 05/29/24 1452   Wed May 29, 2024   0619 I obtained the history and examined the patient as above.    0648 I reentered the room due to a recurrent generalized seizure. This started just after valium administration (given for alcohol withdrawal). Keppra will be initiated as well.   0732 I rechecked and updated the patient as above    0735 I spoke with Tim Akesr NP from the hospitalist. Given the patient's seizures, he asks that I talk with the ICU physician.     0757 I spoke with Dr. Marquez from the hospitalist service regarding the patient's presentation, findings here in the ED, and plan of care. We will plan on ICU admission.       Medical Decision Making / Diagnosis   CMS Diagnoses:  None    Code Status: Full Code    MIPS     None    Medical Decision Making:  This 33-year-old female patient presents to the ED due to concern for seizures.  Please see the HPI and exam for specifics.  Patient denied alcohol abuse to me.  Her family member, who arrived later, noted that the patient does drink regularly but has not for the last several days as she has been home with family.  The patient had 2 seizures prior to arriving in the emergency department and had another generalized seizure while in the emergency department.  She received medications as above as well as treatment for hypokalemia.  She received IV fluids as well.  She was then admitted to the intensive care unit for close monitoring and care.    Critical Care:  None.    Disposition:  See ED Course and MDM    ICD-10 Codes:    ICD-10-CM    1. Alcohol withdrawal seizure without complication (H)  F10.930     R56.9            Discharge Medications:  Current Discharge Medication List         Scribe Disclosure:  Dana VÁSQUEZ, am serving as a scribe at 8:08 AM on 5/29/2024 to document services personally performed by Alverto Justice DO based on my observations and the provider's statements to me.    Leah VÁSQUEZ, am serving as the scribe  for Dana Jefferson, the scribe trainee, at 6:58 AM on 5/29/2024 to document services personally performed by Alverto Justice DO based on our observations and the provider's statements to us.    5/29/2024   Alverto Justice DO     [employer]                    Alverto Justice DO  05/29/24 0933

## 2024-05-29 NOTE — PLAN OF CARE
"ICU End of Shift Summary.  For vital signs and complete assessments, please see documentation flowsheets.      Pertinent assessments: A&OX4- VSS-Afebrile - No medication given for w/d. Family at bedside.    Major Shift Events: Admitted to ICU.     Plan (Upcoming Events): Continue to monitor.    Discharge/Transfer Needs: TBd     Bedside Shift Report Completed : yes  Bedside Safety Check Completed: yes                    Problem: Adult Inpatient Plan of Care  Goal: Plan of Care Review  Description: The Plan of Care Review/Shift note should be completed every shift.  The Outcome Evaluation is a brief statement about your assessment that the patient is improving, declining, or no change.  This information will be displayed automatically on your shift  note.  Outcome: Progressing  Flowsheets (Taken 5/29/2024 1816)  Outcome Evaluation: patient not requiring any medication per CIWA scale. VSS- cooperative- no seizures noted.  Overall Patient Progress: improving  Goal: Patient-Specific Goal (Individualized)  Description: You can add care plan individualizations to a care plan. Examples of Individualization might be:  \"Parent requests to be called daily at 9am for status\", \"I have a hard time hearing out of my right ear\", or \"Do not touch me to wake me up as it startles  me\".  Outcome: Progressing  Goal: Absence of Hospital-Acquired Illness or Injury  Outcome: Progressing  Intervention: Identify and Manage Fall Risk  Recent Flowsheet Documentation  Taken 5/29/2024 1500 by Klaudia Layne RN  Safety Promotion/Fall Prevention:   activity supervised   clutter free environment maintained   lighting adjusted   nonskid shoes/slippers when out of bed   patient and family education   room organization consistent   safety round/check completed  Intervention: Prevent Skin Injury  Recent Flowsheet Documentation  Taken 5/29/2024 1500 by Klaudia Layne, RN  Body Position: position changed independently  Device Skin Pressure " Protection: pressure points protected  Intervention: Prevent and Manage VTE (Venous Thromboembolism) Risk  Recent Flowsheet Documentation  Taken 5/29/2024 1500 by Klaudia Layne RN  VTE Prevention/Management: SCDs (sequential compression devices) off  Intervention: Prevent Infection  Recent Flowsheet Documentation  Taken 5/29/2024 1500 by Klaudia Layne RN  Infection Prevention:   cohorting utilized   equipment surfaces disinfected   hand hygiene promoted   rest/sleep promoted   single patient room provided  Goal: Optimal Comfort and Wellbeing  Outcome: Progressing  Intervention: Provide Person-Centered Care  Recent Flowsheet Documentation  Taken 5/29/2024 1500 by Klaudia Layne RN  Trust Relationship/Rapport:   care explained   choices provided   emotional support provided   questions answered  Goal: Readiness for Transition of Care  Outcome: Progressing  Flowsheets (Taken 5/29/2024 1300)  Transportation Anticipated: none  Intervention: Mutually Develop Transition Plan  Recent Flowsheet Documentation  Taken 5/29/2024 1300 by Klaudia Layne RN  Transportation Anticipated: none  Patient/Family Anticipated Services at Transition: none  Patient/Family Anticipates Transition to: home  Equipment Currently Used at Home: none     Problem: Alcohol Withdrawal  Goal: Alcohol Withdrawal Symptom Control  Outcome: Progressing  Goal: Optimal Neurologic Function  Outcome: Progressing  Goal: Readiness for Change Identified  Outcome: Progressing  Intervention: Promote Psychosocial Wellbeing  Recent Flowsheet Documentation  Taken 5/29/2024 1500 by Klaudia Layne RN  Family/Support System Care:   involvement promoted   presence promoted  Intervention: Partner to Facilitate Behavior Change  Recent Flowsheet Documentation  Taken 5/29/2024 1500 by Klaudia Layne RN  Supportive Measures:   active listening utilized   positive reinforcement provided   Goal Outcome Evaluation:

## 2024-05-29 NOTE — PHARMACY-ADMISSION MEDICATION HISTORY
Pharmacy Intern Admission Medication History    Admission medication history is complete. The information provided in this note is only as accurate as the sources available at the time of the update.    Information Source(s): Patient, Patient's pharmacy, and Prescription bottles via in-person and phone    Pertinent Information: No fill History for this patient. Pt have different last name and address. Per [facility]    Changes made to PTA medication list:  Added: Everything on the list  Deleted: Xanax, Azithromycin, Prozac, Aviane, Alesse, Lessina  Changed: None    Allergies reviewed with patient and updates made in EHR: yes    Medication History Completed By: Raheel Woodruff 5/29/2024 2:25 PM    PTA Med List   Medication Sig Last Dose    albuterol (PROAIR HFA/PROVENTIL HFA/VENTOLIN HFA) 108 (90 Base) MCG/ACT inhaler Inhale 2 puffs into the lungs every 6 hours as needed for shortness of breath, wheezing or cough Unknown at PRN    levothyroxine (SYNTHROID/LEVOTHROID) 75 MCG tablet Take 75 mcg by mouth daily 5/28/2024 at am    sertraline (ZOLOFT) 50 MG tablet Take 50 mg by mouth daily 5/28/2024 at am    traZODone (DESYREL) 50 MG tablet Take  mg by mouth at bedtime Past Month at -

## 2024-05-29 NOTE — ED NOTES
Patient was in the middle of speaking when patient stopped talking and began to seize. RN placed patient on side and emergency broadcast to unit. More RN's and MD Justice came to bedside. Patient was suctioned placed on oxymask and the keppra infusion was started. Seizure lasted approximately 2 mins. Patient stopped seizing. MD placing new orders. Brother educated on the fact that if the patient begins seizing with no staff in the room to alert staff quickly.

## 2024-05-30 ENCOUNTER — APPOINTMENT (OUTPATIENT)
Dept: MRI IMAGING | Facility: CLINIC | Age: 34
DRG: 101 | End: 2024-05-30
Attending: HOSPITALIST

## 2024-05-30 LAB
ALBUMIN SERPL BCG-MCNC: 3.4 G/DL (ref 3.5–5.2)
ALP SERPL-CCNC: 63 U/L (ref 40–150)
ALT SERPL W P-5'-P-CCNC: 60 U/L (ref 0–50)
ANION GAP SERPL CALCULATED.3IONS-SCNC: 13 MMOL/L (ref 7–15)
AST SERPL W P-5'-P-CCNC: 107 U/L (ref 0–45)
BILIRUB SERPL-MCNC: 0.7 MG/DL
BUN SERPL-MCNC: 6.5 MG/DL (ref 6–20)
CALCIUM SERPL-MCNC: 8.3 MG/DL (ref 8.6–10)
CHLORIDE SERPL-SCNC: 103 MMOL/L (ref 98–107)
CREAT SERPL-MCNC: 0.55 MG/DL (ref 0.51–0.95)
DEPRECATED HCO3 PLAS-SCNC: 24 MMOL/L (ref 22–29)
EGFRCR SERPLBLD CKD-EPI 2021: >90 ML/MIN/1.73M2
ERYTHROCYTE [DISTWIDTH] IN BLOOD BY AUTOMATED COUNT: 12.1 % (ref 10–15)
GLUCOSE BLDC GLUCOMTR-MCNC: 227 MG/DL (ref 70–99)
GLUCOSE BLDC GLUCOMTR-MCNC: 95 MG/DL (ref 70–99)
GLUCOSE BLDC GLUCOMTR-MCNC: 99 MG/DL (ref 70–99)
GLUCOSE SERPL-MCNC: 111 MG/DL (ref 70–99)
HCG INTACT+B SERPL-ACNC: <1 MIU/ML
HCT VFR BLD AUTO: 38.7 % (ref 35–47)
HGB BLD-MCNC: 12.7 G/DL (ref 11.7–15.7)
MCH RBC QN AUTO: 31.8 PG (ref 26.5–33)
MCHC RBC AUTO-ENTMCNC: 32.8 G/DL (ref 31.5–36.5)
MCV RBC AUTO: 97 FL (ref 78–100)
PLATELET # BLD AUTO: 113 10E3/UL (ref 150–450)
POTASSIUM SERPL-SCNC: 2.9 MMOL/L (ref 3.4–5.3)
POTASSIUM SERPL-SCNC: 3.6 MMOL/L (ref 3.4–5.3)
PROT SERPL-MCNC: 6.1 G/DL (ref 6.4–8.3)
RBC # BLD AUTO: 4 10E6/UL (ref 3.8–5.2)
SODIUM SERPL-SCNC: 140 MMOL/L (ref 135–145)
WBC # BLD AUTO: 4.2 10E3/UL (ref 4–11)

## 2024-05-30 PROCEDURE — 84132 ASSAY OF SERUM POTASSIUM: CPT | Performed by: HOSPITALIST

## 2024-05-30 PROCEDURE — 82040 ASSAY OF SERUM ALBUMIN: CPT | Performed by: HOSPITALIST

## 2024-05-30 PROCEDURE — 36415 COLL VENOUS BLD VENIPUNCTURE: CPT | Performed by: HOSPITALIST

## 2024-05-30 PROCEDURE — 99232 SBSQ HOSP IP/OBS MODERATE 35: CPT | Performed by: HOSPITALIST

## 2024-05-30 PROCEDURE — C7900 HC HOPD MH 15-29 MINS: HCPCS | Performed by: COUNSELOR

## 2024-05-30 PROCEDURE — 258N000001 HC RX 258: Performed by: HOSPITALIST

## 2024-05-30 PROCEDURE — 70553 MRI BRAIN STEM W/O & W/DYE: CPT

## 2024-05-30 PROCEDURE — A9585 GADOBUTROL INJECTION: HCPCS | Performed by: HOSPITALIST

## 2024-05-30 PROCEDURE — 84702 CHORIONIC GONADOTROPIN TEST: CPT | Performed by: HOSPITALIST

## 2024-05-30 PROCEDURE — 85027 COMPLETE CBC AUTOMATED: CPT | Performed by: HOSPITALIST

## 2024-05-30 PROCEDURE — 250N000013 HC RX MED GY IP 250 OP 250 PS 637: Performed by: HOSPITALIST

## 2024-05-30 PROCEDURE — 255N000002 HC RX 255 OP 636: Performed by: HOSPITALIST

## 2024-05-30 PROCEDURE — 120N000001 HC R&B MED SURG/OB

## 2024-05-30 RX ORDER — POTASSIUM CHLORIDE 1500 MG/1
20 TABLET, EXTENDED RELEASE ORAL ONCE
Status: COMPLETED | OUTPATIENT
Start: 2024-05-30 | End: 2024-05-30

## 2024-05-30 RX ORDER — POTASSIUM CHLORIDE 1500 MG/1
40 TABLET, EXTENDED RELEASE ORAL ONCE
Status: COMPLETED | OUTPATIENT
Start: 2024-05-30 | End: 2024-05-30

## 2024-05-30 RX ORDER — GADOBUTROL 604.72 MG/ML
6 INJECTION INTRAVENOUS ONCE
Status: COMPLETED | OUTPATIENT
Start: 2024-05-30 | End: 2024-05-30

## 2024-05-30 RX ADMIN — POTASSIUM CHLORIDE 40 MEQ: 1500 TABLET, EXTENDED RELEASE ORAL at 08:16

## 2024-05-30 RX ADMIN — FOLIC ACID 1 MG: 1 TABLET ORAL at 08:16

## 2024-05-30 RX ADMIN — POTASSIUM CHLORIDE, DEXTROSE MONOHYDRATE AND SODIUM CHLORIDE: 150; 5; 900 INJECTION, SOLUTION INTRAVENOUS at 10:14

## 2024-05-30 RX ADMIN — LEVOTHYROXINE SODIUM 75 MCG: 0.07 TABLET ORAL at 08:16

## 2024-05-30 RX ADMIN — CLONIDINE HYDROCHLORIDE 0.1 MG: 0.1 TABLET ORAL at 06:28

## 2024-05-30 RX ADMIN — SERTRALINE HYDROCHLORIDE 50 MG: 50 TABLET ORAL at 08:16

## 2024-05-30 RX ADMIN — POTASSIUM CHLORIDE, DEXTROSE MONOHYDRATE AND SODIUM CHLORIDE: 150; 5; 900 INJECTION, SOLUTION INTRAVENOUS at 00:33

## 2024-05-30 RX ADMIN — POTASSIUM CHLORIDE 20 MEQ: 1500 TABLET, EXTENDED RELEASE ORAL at 10:40

## 2024-05-30 RX ADMIN — THIAMINE HCL TAB 100 MG 100 MG: 100 TAB at 08:16

## 2024-05-30 RX ADMIN — GADOBUTROL 6 ML: 604.72 INJECTION INTRAVENOUS at 20:22

## 2024-05-30 RX ADMIN — GABAPENTIN 900 MG: 300 CAPSULE ORAL at 08:15

## 2024-05-30 RX ADMIN — POTASSIUM CHLORIDE, DEXTROSE MONOHYDRATE AND SODIUM CHLORIDE: 150; 5; 900 INJECTION, SOLUTION INTRAVENOUS at 21:53

## 2024-05-30 ASSESSMENT — ACTIVITIES OF DAILY LIVING (ADL)
ADLS_ACUITY_SCORE: 32
ADLS_ACUITY_SCORE: 34
ADLS_ACUITY_SCORE: 34
ADLS_ACUITY_SCORE: 32
ADLS_ACUITY_SCORE: 24
ADLS_ACUITY_SCORE: 32
ADLS_ACUITY_SCORE: 30
ADLS_ACUITY_SCORE: 32
ADLS_ACUITY_SCORE: 25
ADLS_ACUITY_SCORE: 32

## 2024-05-30 NOTE — PLAN OF CARE
"ICU End of Shift Summary.  For vital signs and complete assessments, please see documentation flowsheets.      Pertinent assessments: A&OX4- but slow to respond to questions.  Unsteady on feet, needs simple directions to do tasks.  Tele SR- Lungs clear - not scoring on CIWA scale- Appetite fair- Brother at bedside, updated and questions answered.    Major Shift Events:  - Potassium replaced                                     - EEG complete                                     - waiting to have MRI    Plan (Upcoming Events): TBD  Discharge/Transfer Needs: TBD     Bedside Shift Report Completed : yes   Bedside Safety Check Completed: yes                Problem: Adult Inpatient Plan of Care  Goal: Plan of Care Review  Description: The Plan of Care Review/Shift note should be completed every shift.  The Outcome Evaluation is a brief statement about your assessment that the patient is improving, declining, or no change.  This information will be displayed automatically on your shift  note.  Outcome: Progressing  Flowsheets (Taken 5/30/2024 1806)  Outcome Evaluation: VSS- Still slow to answer questions- speech deliberate - unsteady on feet.  Plan of Care Reviewed With:   patient   sibling  Overall Patient Progress: improving  Goal: Patient-Specific Goal (Individualized)  Description: You can add care plan individualizations to a care plan. Examples of Individualization might be:  \"Parent requests to be called daily at 9am for status\", \"I have a hard time hearing out of my right ear\", or \"Do not touch me to wake me up as it startles  me\".  Outcome: Progressing  Goal: Absence of Hospital-Acquired Illness or Injury  Outcome: Progressing  Intervention: Identify and Manage Fall Risk  Recent Flowsheet Documentation  Taken 5/30/2024 1600 by Klaudia Layne RN  Safety Promotion/Fall Prevention:   activity supervised   clutter free environment maintained   increased rounding and observation   increase visualization of patient   " lighting adjusted   mobility aid in reach   nonskid shoes/slippers when out of bed   patient and family education   room near nurse's station   safety round/check completed   supervised activity   treat underlying cause  Taken 5/30/2024 1200 by Klaudia Layne RN  Safety Promotion/Fall Prevention:   activity supervised   clutter free environment maintained   increased rounding and observation   increase visualization of patient   lighting adjusted   mobility aid in reach   nonskid shoes/slippers when out of bed   patient and family education   room near nurse's station   safety round/check completed   supervised activity   treat underlying cause  Taken 5/30/2024 0800 by Klaudia Layne RN  Safety Promotion/Fall Prevention:   activity supervised   clutter free environment maintained   increased rounding and observation   increase visualization of patient   lighting adjusted   mobility aid in reach   nonskid shoes/slippers when out of bed   patient and family education   room near nurse's station   safety round/check completed   supervised activity   treat underlying cause  Intervention: Prevent Skin Injury  Recent Flowsheet Documentation  Taken 5/30/2024 1600 by Klaudia Layne RN  Body Position: position changed independently  Skin Protection: incontinence pads utilized  Device Skin Pressure Protection: tubing/devices free from skin contact  Taken 5/30/2024 1200 by Klaudia Layne RN  Body Position: position changed independently  Skin Protection: incontinence pads utilized  Device Skin Pressure Protection: tubing/devices free from skin contact  Taken 5/30/2024 1100 by Klaudia Layne RN  Body Position: position changed independently  Taken 5/30/2024 0800 by Klaudia Layne RN  Body Position: position changed independently  Skin Protection: incontinence pads utilized  Device Skin Pressure Protection: tubing/devices free from skin contact  Intervention: Prevent and Manage VTE (Venous  Thromboembolism) Risk  Recent Flowsheet Documentation  Taken 5/30/2024 1600 by Klaudia Layne RN  VTE Prevention/Management: SCDs (sequential compression devices) off  Taken 5/30/2024 1200 by Klaudia Layne RN  VTE Prevention/Management: SCDs (sequential compression devices) off  Taken 5/30/2024 0800 by Klaudia Layne RN  VTE Prevention/Management: SCDs (sequential compression devices) off  Intervention: Prevent Infection  Recent Flowsheet Documentation  Taken 5/30/2024 1600 by Klaudia Layne RN  Infection Prevention:   cohorting utilized   equipment surfaces disinfected   hand hygiene promoted   rest/sleep promoted   single patient room provided  Taken 5/30/2024 1200 by Klaudia Layne RN  Infection Prevention:   cohorting utilized   equipment surfaces disinfected   hand hygiene promoted   rest/sleep promoted   single patient room provided  Taken 5/30/2024 0800 by Klaudia Layne RN  Infection Prevention:   cohorting utilized   equipment surfaces disinfected   hand hygiene promoted   rest/sleep promoted   single patient room provided  Goal: Optimal Comfort and Wellbeing  Outcome: Progressing  Intervention: Provide Person-Centered Care  Recent Flowsheet Documentation  Taken 5/30/2024 1600 by Klaudia Layne RN  Trust Relationship/Rapport:   care explained   choices provided   emotional support provided   questions answered  Taken 5/30/2024 1200 by Klaudia Layne RN  Trust Relationship/Rapport:   care explained   choices provided   emotional support provided   questions answered  Taken 5/30/2024 0800 by Klaudia Layne RN  Trust Relationship/Rapport:   care explained   choices provided   emotional support provided   questions answered  Goal: Readiness for Transition of Care  Outcome: Progressing     Problem: Alcohol Withdrawal  Goal: Alcohol Withdrawal Symptom Control  Outcome: Progressing  Intervention: Minimize or Manage Alcohol Withdrawal Symptoms  Recent Flowsheet  Documentation  Taken 5/30/2024 1600 by Klaudia Layne RN  Sensory Stimulation Regulation:   lighting decreased   care clustered  Aspiration Precautions: awake/alert before oral intake  Seizure Precautions:   activity supervised   clutter-free environment maintained   side rails padded   soft boundaries provided  Taken 5/30/2024 1200 by Klaudia Layne RN  Sensory Stimulation Regulation:   lighting decreased   care clustered  Aspiration Precautions: awake/alert before oral intake  Seizure Precautions:   activity supervised   clutter-free environment maintained   side rails padded   soft boundaries provided  Taken 5/30/2024 0800 by Klaudia Layne RN  Sensory Stimulation Regulation:   lighting decreased   care clustered  Aspiration Precautions: awake/alert before oral intake  Seizure Precautions:   activity supervised   clutter-free environment maintained   side rails padded   soft boundaries provided  Goal: Optimal Neurologic Function  Outcome: Progressing  Intervention: Minimize or Manage Acute Neurologic Symptoms  Recent Flowsheet Documentation  Taken 5/30/2024 1600 by Klaudia Layne RN  Sensory Stimulation Regulation:   lighting decreased   care clustered  Cerebral Perfusion Promotion: blood pressure monitored  Taken 5/30/2024 1200 by Klaudia Layne RN  Sensory Stimulation Regulation:   lighting decreased   care clustered  Cerebral Perfusion Promotion: blood pressure monitored  Taken 5/30/2024 0800 by Klaudia Layne RN  Sensory Stimulation Regulation:   lighting decreased   care clustered  Cerebral Perfusion Promotion: blood pressure monitored  Intervention: Prevent Seizure-Related Injury  Recent Flowsheet Documentation  Taken 5/30/2024 1600 by Klaudia Layne RN  Seizure Precautions:   activity supervised   clutter-free environment maintained   side rails padded   soft boundaries provided  Taken 5/30/2024 1200 by Klaudia Layne RN  Seizure Precautions:   activity supervised    clutter-free environment maintained   side rails padded   soft boundaries provided  Taken 5/30/2024 0800 by Klaudia Layne RN  Seizure Precautions:   activity supervised   clutter-free environment maintained   side rails padded   soft boundaries provided  Goal: Readiness for Change Identified  Outcome: Progressing  Intervention: Promote Psychosocial Wellbeing  Recent Flowsheet Documentation  Taken 5/30/2024 1600 by Klaudia Layne RN  Family/Support System Care:   involvement promoted   presence promoted  Taken 5/30/2024 1200 by Klaudia Layne RN  Family/Support System Care:   involvement promoted   presence promoted  Taken 5/30/2024 0800 by Klaudia Layne RN  Family/Support System Care:   involvement promoted   presence promoted  Intervention: Partner to Facilitate Behavior Change  Recent Flowsheet Documentation  Taken 5/30/2024 1600 by Klaudia Layne RN  Supportive Measures:   active listening utilized   positive reinforcement provided  Taken 5/30/2024 1200 by Klaudia Layne RN  Supportive Measures:   active listening utilized   positive reinforcement provided  Taken 5/30/2024 0800 by Klaudia Layne RN  Supportive Measures:   active listening utilized   positive reinforcement provided     Problem: Comorbidity Management  Goal: Maintenance of Seizure Control  Outcome: Progressing  Intervention: Maintain Seizure Symptom Control  Recent Flowsheet Documentation  Taken 5/30/2024 1600 by Klaudia Layne RN  Sensory Stimulation Regulation:   lighting decreased   care clustered  Seizure Precautions:   activity supervised   clutter-free environment maintained   side rails padded   soft boundaries provided  Medication Review/Management: medications reviewed  Taken 5/30/2024 1200 by Klaudia Layne RN  Sensory Stimulation Regulation:   lighting decreased   care clustered  Seizure Precautions:   activity supervised   clutter-free environment maintained   side rails padded   soft boundaries  provided  Medication Review/Management: medications reviewed  Taken 5/30/2024 0800 by Klaudia Layne RN  Sensory Stimulation Regulation:   lighting decreased   care clustered  Seizure Precautions:   activity supervised   clutter-free environment maintained   side rails padded   soft boundaries provided  Medication Review/Management: medications reviewed   Goal Outcome Evaluation:      Plan of Care Reviewed With: patient, sibling    Overall Patient Progress: improvingOverall Patient Progress: improving    Outcome Evaluation: VSS- Still slow to answer questions- speech deliberate - unsteady on feet.

## 2024-05-30 NOTE — PLAN OF CARE
ICU End of Shift Summary.  For vital signs and complete assessments, please see documentation flowsheets.      Pertinent assessments:   Neuro: Alert and oriented, becoming more anxious scoring on CIWA ativan given per protocol.   Cardiac: VSS  Resp: LS clear on RA  GI: WDL  : WDL  Skin: scattered bruising  Lines: 2 PIV      Major Shift Events:   Patient states recently switching from drinking beer to vodka, with recent changes happening in her life.   Writer offered a SW and education provided with care of plan moving forward. Patient agreeable to this and would like to talk with a SW.  Ax1 GB to the bathroom gait becoming more unsteady - bed alarm on.     Plan (Upcoming Events): Patient request for SW consult.   Discharge/Transfer Needs:      Bedside Shift Report Completed :   Bedside Safety Check Completed:        Goal Outcome Evaluation:      Plan of Care Reviewed With: patient    Overall Patient Progress: no changeOverall Patient Progress: no change    Outcome Evaluation: Alert and oriented, becoming more anxious scored on CIWA and ativan given per protocol. Patient states switched from beer to vodka recently, and requesting SW consult. Denied pain. Mentrual cycle started - supplies given and partial bath completed. Ax1 with GB - pt. unsteady gait.      Problem: Adult Inpatient Plan of Care  Goal: Plan of Care Review  Description: The Plan of Care Review/Shift note should be completed every shift.  The Outcome Evaluation is a brief statement about your assessment that the patient is improving, declining, or no change.  This information will be displayed automatically on your shift  note.  5/30/2024 0341 by Karen Singh RN  Outcome: Progressing  Flowsheets (Taken 5/30/2024 0341)  Outcome Evaluation: Alert and oriented, becoming more anxious scored on CIWA and ativan given per protocol. Patient states switched from beer to vodka recently, and requesting SW consult. Denied pain. Mentrual cycle started -  "supplies given and partial bath completed. Ax1 with GB - pt. unsteady gait.  5/30/2024 0339 by Karen Singh RN  Outcome: Progressing  Flowsheets (Taken 5/30/2024 0338)  Outcome Evaluation: Alert and oriented, becoming more anxious scored on CIWA and ativan given per protocol. Patient states switched from beer to vodka recently, and requesting SW consult. Denied pain. Mentrual cycle started - supplies given and partial bath completed. Ax1 with GB - pt. unsteady gait.  Plan of Care Reviewed With: patient  Overall Patient Progress: no change  Goal: Patient-Specific Goal (Individualized)  Description: You can add care plan individualizations to a care plan. Examples of Individualization might be:  \"Parent requests to be called daily at 9am for status\", \"I have a hard time hearing out of my right ear\", or \"Do not touch me to wake me up as it startles  me\".  5/30/2024 0341 by Karen Singh RN  Outcome: Progressing  5/30/2024 0339 by Karen Singh RN  Outcome: Progressing  Goal: Absence of Hospital-Acquired Illness or Injury  5/30/2024 0341 by Karen Singh RN  Outcome: Progressing  5/30/2024 0339 by Karen Singh RN  Outcome: Progressing  Intervention: Identify and Manage Fall Risk  Recent Flowsheet Documentation  Taken 5/30/2024 0100 by Karen Singh RN  Safety Promotion/Fall Prevention:   activity supervised   clutter free environment maintained   increased rounding and observation   increase visualization of patient   lighting adjusted   mobility aid in reach   nonskid shoes/slippers when out of bed   patient and family education   room near nurse's station   safety round/check completed   supervised activity   treat underlying cause  Taken 5/29/2024 2145 by Karen Snigh RN  Safety Promotion/Fall Prevention:   activity supervised   clutter free environment maintained   increased rounding and observation   increase visualization of patient   lighting adjusted   mobility aid in reach   nonskid " shoes/slippers when out of bed   patient and family education   room near nurse's station   safety round/check completed   supervised activity   treat underlying cause  Intervention: Prevent Skin Injury  Recent Flowsheet Documentation  Taken 5/30/2024 0100 by Karen Singh RN  Body Position: position changed independently  Skin Protection: incontinence pads utilized  Device Skin Pressure Protection: tubing/devices free from skin contact  Taken 5/29/2024 2145 by Karen Singh RN  Body Position: position changed independently  Skin Protection: incontinence pads utilized  Device Skin Pressure Protection: tubing/devices free from skin contact  Intervention: Prevent and Manage VTE (Venous Thromboembolism) Risk  Recent Flowsheet Documentation  Taken 5/30/2024 0100 by Karen Singh RN  VTE Prevention/Management: SCDs (sequential compression devices) off  Taken 5/29/2024 2145 by Karen Singh RN  VTE Prevention/Management: SCDs (sequential compression devices) off  Intervention: Prevent Infection  Recent Flowsheet Documentation  Taken 5/30/2024 0100 by Karen Singh RN  Infection Prevention:   cohorting utilized   equipment surfaces disinfected   hand hygiene promoted   rest/sleep promoted   single patient room provided  Taken 5/29/2024 2145 by Karen Singh RN  Infection Prevention:   cohorting utilized   equipment surfaces disinfected   hand hygiene promoted   rest/sleep promoted   single patient room provided  Goal: Optimal Comfort and Wellbeing  5/30/2024 0341 by Karen Singh RN  Outcome: Progressing  5/30/2024 0339 by Karen Singh RN  Outcome: Progressing  Intervention: Provide Person-Centered Care  Recent Flowsheet Documentation  Taken 5/30/2024 0100 by Karen Singh RN  Trust Relationship/Rapport:   care explained   choices provided   emotional support provided   questions answered  Taken 5/29/2024 2145 by Karen Singh RN  Trust Relationship/Rapport:   care explained   choices provided    emotional support provided   questions answered  Goal: Readiness for Transition of Care  5/30/2024 0341 by Karen Singh RN  Outcome: Progressing  5/30/2024 0339 by Karen Singh RN  Outcome: Progressing     Problem: Alcohol Withdrawal  Goal: Alcohol Withdrawal Symptom Control  5/30/2024 0341 by Karen Singh RN  Outcome: Progressing  5/30/2024 0339 by Karen Singh RN  Outcome: Progressing  Intervention: Minimize or Manage Alcohol Withdrawal Symptoms  Recent Flowsheet Documentation  Taken 5/30/2024 0100 by Karen Singh RN  Sensory Stimulation Regulation:   lighting decreased   care clustered  Aspiration Precautions: awake/alert before oral intake  Seizure Precautions:   activity supervised   clutter-free environment maintained   side rails padded   soft boundaries provided  Taken 5/29/2024 2145 by Karen Singh RN  Sensory Stimulation Regulation:   lighting decreased   care clustered  Aspiration Precautions: awake/alert before oral intake  Seizure Precautions:   activity supervised   clutter-free environment maintained   side rails padded   soft boundaries provided  Goal: Optimal Neurologic Function  5/30/2024 0341 by Karen Singh RN  Outcome: Progressing  5/30/2024 0339 by Karen Singh RN  Outcome: Progressing  Intervention: Minimize or Manage Acute Neurologic Symptoms  Recent Flowsheet Documentation  Taken 5/30/2024 0100 by Karen Singh RN  Sensory Stimulation Regulation:   lighting decreased   care clustered  Cerebral Perfusion Promotion: blood pressure monitored  Taken 5/29/2024 2145 by Karen Singh RN  Sensory Stimulation Regulation:   lighting decreased   care clustered  Cerebral Perfusion Promotion: blood pressure monitored  Intervention: Prevent Seizure-Related Injury  Recent Flowsheet Documentation  Taken 5/30/2024 0100 by Karen Singh RN  Seizure Precautions:   activity supervised   clutter-free environment maintained   side rails padded   soft boundaries provided  Taken  5/29/2024 2145 by Karen Singh RN  Seizure Precautions:   activity supervised   clutter-free environment maintained   side rails padded   soft boundaries provided  Goal: Readiness for Change Identified  5/30/2024 0341 by Karen Singh RN  Outcome: Progressing  5/30/2024 0339 by Karen Singh RN  Outcome: Progressing  Intervention: Partner to Facilitate Behavior Change  Recent Flowsheet Documentation  Taken 5/30/2024 0100 by Karen Singh RN  Supportive Measures:   active listening utilized   positive reinforcement provided  Taken 5/29/2024 2145 by Karen Singh RN  Supportive Measures:   active listening utilized   positive reinforcement provided     Problem: Comorbidity Management  Goal: Maintenance of Seizure Control  Outcome: Progressing  Intervention: Maintain Seizure Symptom Control  Recent Flowsheet Documentation  Taken 5/30/2024 0100 by Karen Singh RN  Sensory Stimulation Regulation:   lighting decreased   care clustered  Seizure Precautions:   activity supervised   clutter-free environment maintained   side rails padded   soft boundaries provided  Medication Review/Management: medications reviewed  Taken 5/29/2024 2145 by Karen Singh RN  Sensory Stimulation Regulation:   lighting decreased   care clustered  Seizure Precautions:   activity supervised   clutter-free environment maintained   side rails padded   soft boundaries provided  Medication Review/Management: medications reviewed

## 2024-05-30 NOTE — DISCHARGE INSTRUCTIONS
CD Treatment Contact Info      Mental Health and Addiction Services Line: [phone] Appt through [employer] [facility].    [facility]  [facility] Admissions  Emmie Hewitt - [phone]  [email or web address]    [facility]  Phone: [phone]  Fax: [phone]    [facility]   [email or web address]  Phone: [phone]  Fax: [phone]    [facility] [place] [facility] [place]  Phone: [phone]  Fax: [phone]        [facility] Treatment Programs:    [place] [address], [address]  [place], [place] [address]  [email or web address]  Phone: [phone]  Fax: [phone]  [email or web address]    [place] [facility]  Phone: [phone]  Fax: [phone]  [email or web address]    [facility] [place]  Phone: [phone]  Fax:  [phone]  Email: [email or web address]  [email or web address]    [facility]  Main phone: [phone]  Direct Dial: [phone]  Admissions email: [email or web address]  [facility] fax: [phone]  [email or web address]         Supportive Services    [facility]   [email or web address]    [place] [facility]  [address]  [place] [address]  Phone: [phone] [email or web address]    Alcoholics Anonymous   [email or web address]    Community Drug & Alcohol Support Resources  Alcoholics Anonymous  24/7 Phone Line: [phone]  [email or web address] For additional list of online meetings: [email or web address]-[email or web address]

## 2024-05-30 NOTE — CONSULTS
5/30/2024  Pt completed her CHRISSY CA today. She is interested in IOP CHRISSY treatment. She does not know which CHRISSY tx program she would like to attend so I emailed her the below information to her today. Pt stated she is working on her MA.    Next Steps:  1) pt picks a tx program.  2) pt informs me or the unit sw which tx program he wants to go to.  3) pt signs ENZO for tx program.  4) either myself or unit SW sends CHRISSY CA to tx program.    [facility] Service Initiation Date ID: [id number]    Recommendations:   1)  Complete an Intensive Outpatient CD Treatment Program.  2)  Abstain from all mood-altering chemicals unless prescribed by a licensed provider.   3)  Attend, at minimum, 2 weekly support group meetings, such as 12 step based (AA/NA), SMART Recovery, Health Realizations, and/or Refuge Recovery meetings.     4)  Actively work with a female mentor/sponsor on a weekly basis.   5)  Follow all the recommendations of your treatment/medical providers.    Clinical Substantiation:    Pt's drinking has increased over the past year. She is starting to recognize that she has a problem with alcohol. She has never attended a CHRISSY treatment program before nor has she attended a sober support group before. She is open to IOP CHRISSY treatment.    Referrals/ Alternatives:    [facility]  [address], [address]  [place], [place] [address]  [email or web address]  Phone: [phone]  Fax: [phone]  [email or web address]    [facility]   Phone: [phone]  Fax: [phone]  [email or web address]    [facility] [place]  Phone: [phone]  Fax:  [phone]  Email: [email or web address]  [email or web address]    [facility]  Main phone: [phone]  Direct Dial: [phone]   Admissions email: [email or web address]   CHRISSY fax:  [phone]  [email or web address]    CHRISSY consult completed by: RONAL Escamilla.  Phone Number: [phone]  E-mail Address: [email or web address]  [address] [place] [address]     *Due to regulation of Title 42 of the Code of Federal Regulations (CFR) Part 2: Confidentiality laws apply to this note and the information wherein.  Thus, this note cannot be copy and pasted into any other health care staff's note nor can it be included in general medical records sent to ANY outside agency without the patient's written consent.

## 2024-05-30 NOTE — PROGRESS NOTES
Neurology follow-up: 05/30/24    Patient admitted with 2 witnessed seizures in the setting of alcohol withdrawal.  She had an episode about 2 years ago with similar seizure-like activity, questionably in the setting of alcohol withdrawal at that point as well.        Interval history and investigations   Patient doing much better today.  Tells me that she feels fairly back to her baseline.  Tells me that she felt somewhat jittery and anxious yesterday, but that seems to be calming down now.    EEG shows medication effects and some slowing, but otherwise basically normal.  Does not show any sharp waves or spikes.    Examination   Alert, oriented x 3.  Extraocular movements normal.  No facial asymmetry.  Motor examination grossly 5/5 strength in bilateral upper and lower extremities.      Finger-nose-finger testing shows much less ataxia as compared to yesterday.  Heel shin toe testing grossly normal.  Was able to stand up without ataxia.  Walked 2-3 steps without difficulty.    Reflexes equitable and symmetrical.      ASSESSMENT     Alcohol withdrawal seizures.  Alcohol withdrawal.  No clear underlying epileptiform disorder.  MRI still pending, will follow-up tomorrow with results.  Will monitor clinically going forward, and decide if patient needs antiepileptic medications, but could be off antiepileptic medications for now.       RECOMMENDATIONS   Will follow-up on MRI results.  Please continue alcohol withdrawal protocols.  No indication to restart antiepileptic medications for now.      I have discussed the above details with Ms. Vazquez at great length and they expressed understanding.  They seemed satisfied with this conversation, and had no further queries as of now.  she has our contact information and has been advised to stay in touch with us regarding any other issues that may arise.     Thank you for allowing me to participate in Ms. Vazquez's care.       Carlitos Cohen MD   Neurologist, [facility]   May 30, 2024 4:39 PM      A total time of 36 minutes was spent on the care of this patient on the date of the visit patient is at risk of significant deterioration and death due to recent seizures/alcohol withdrawal/delirium tremens.  Please excuse any typographical errors, as this note was generated using a Voice Recognition Software.

## 2024-05-30 NOTE — PROGRESS NOTES
[facility]    Hospitalist Progress Note      Assessment & Plan   Carley Vazquez is a 33 year old female with a past medical history of anxiety/depression who presents with multiple seizures.     #Seizures x3.  Alcohol use disorder with withdrawal: Patient's brother was called by his other sibling over the weekend stating that Carley was not doing well.  She is known to have increased her drinking as of late.  She reportedly got  last year and there were some increased stressors related to this issue.  Patient's brother went to see the patient on Monday, 5/27.  She was reportedly intoxicated.  Brother notes that she had multiple bottles with vodka in it.  She came with him to his house.  He reports that he was tremulous through the day on Tuesday.  Patient was seen at around 5 AM and having seizure activity.  Brother describes it as her being stiff.  Lasted about 3 minutes.  She did come to and at that point was agreeable to be brought to the ER.  En route to the ER, patient reportedly had another seizure that lasted again 3 minutes.  She was somnolent by the time she arrived in the ER.  When she was in the ER she did start to talk a bit but then had another seizure.  Patient denies any current pain.  She tells me she is feeling better.  No chest pain or shortness of breath.  No recent illness.  She only tells me that she drinks 1-2 drinks per day.  -ER, patient afebrile nontachycardic.  Normotensive.  Saturating okay on room air.  Her CBC showed mild thrombocytopenia.  BMP with mild hyponatremia and hypokalemia.  LFTs elevated in a manner consistent with alcohol use.  EKG done that did show sinus rhythm with QTc 485.  CT head without acute intracranial abnormality.  X-ray of the shoulder was also done negative for fracture.    Patient was given 1 L of IV fluid, 1 g of IV Keppra along with 10 mg of IV Valium.  She was also given 1200 mg of gabapentin.  -Patient initially did not admit  to heavy alcohol use but on 5/30 she notes that she switched from beer to vodka about 2 months ago.  She notes that 1.75 L of vodka last for about a week.  She admits to drinking much more heavily as of late.  She is interested in stopping and is willing to get chemical dependency for support.  -Patient initially was admitted to the ICU given multiple seizures.  Once in the ICU, patient did not show any signs of significant withdrawal.  She only got 1 dose of oral Ativan in the late evening of 5/29.  She was calm and cooperative on the a.m. of 5/30.  She was able to meet with chemical dependency who provided resources to help her with her alcohol cessation.  -Neurology was consulted.  They recommended an MRI along with EEG which have been ordered.  -Follow-up MRI and EEG.  If these are both normal then suspect patient will be able to discharge it would not need longstanding Keppra given that this is likely secondary to alcohol withdrawal.    Addendum: Discussed with patient that she has been somewhat unsteady on feet today.  May be due to high dose gabapentin.  Will obtain MRI per neurology, monitor tonight and likely discharge tomorrow.       #Thrombocytopenia secondary to alcohol use: Monitor  #Elevation of LFT's secondary to alcohol use: Monitor. Cessation advised.   #Hyponatremia, hypokalemia: IVF with replacement  #Hypothyroidism: Continue home LT4 once verified.   #Anxiety: Continue home fluoxetine once verified     DVT Prophylaxis: Pneumatic Compression Devices  Code Status: Full Code  Dispo: Patient can transfer out of the ICU.  She may be able to discharge later today after MRI and EEG if both normal.    Tim Marquez MD    Interval History   No events.  No significant withdrawal.  She tells me that she switched from beer to vodka about 1 to 2 months ago.  She now admits she may have an alcohol problem.  She is willing to who discussed with chemical dependency support mechanisms.  Has never had withdrawal  before such as this.  She denies any headache.  No chest pain.  No fevers or chills.  Eating and drinking okay.  No nausea or vomiting.    -Data reviewed today: I reviewed all new labs and imaging results over the last 24 hours. I personally reviewed     Physical Exam   Temp: 98.6  F (37  C) Temp src: Temporal BP: (!) 150/55 Pulse: 84   Resp: 18 SpO2: 99 % O2 Device: None (Room air)    Vitals:    05/29/24 0627   Weight: 59 kg (130 lb)     Vital Signs with Ranges  Temp:  [98  F (36.7  C)-98.6  F (37  C)] 98.6  F (37  C)  Pulse:  [62-89] 84  Resp:  [9-21] 18  BP: (118-161)/() 150/55  SpO2:  [97 %-100 %] 99 %  I/O last 3 completed shifts:  In: 1851.67 [P.O.:200; I.V.:1651.67]  Out: -     Constitutional: Nontoxic, NAD  HEENT: Normocephalic. MMM, No elevation of JVD noted.   Respiratory: Nl WOB, Clear bilaterally, No wheezes or crackles  Cardiovascular: Regular, no murmur  GI: BS+, NT, ND  Skin/Integumen: WWP, no rash. No edema  Neuro: CNII-XII intact. Moves all extremities. No tremor. A&Ox3.    Medications   Current Facility-Administered Medications   Medication Dose Route Frequency Provider Last Rate Last Admin    dextrose 5% and 0.9% NaCl + KCL 20 mEq/L infusion   Intravenous Continuous Tim Marquez  mL/hr at 05/30/24 1014 New Bag at 05/30/24 1014     Current Facility-Administered Medications   Medication Dose Route Frequency Provider Last Rate Last Admin    folic acid (FOLVITE) tablet 1 mg  1 mg Oral Daily Tim Marquez MD   1 mg at 05/30/24 0816    [START ON 6/5/2024] gabapentin (NEURONTIN) capsule 100 mg  100 mg Oral Q8H Tim Marquez MD        [START ON 6/3/2024] gabapentin (NEURONTIN) capsule 300 mg  300 mg Oral Q8H Tim Marquez MD        [START ON 6/1/2024] gabapentin (NEURONTIN) capsule 600 mg  600 mg Oral Q8H Tim Marquez MD        gabapentin (NEURONTIN) capsule 900 mg  900 mg Oral Q8H Tim Marquez MD   900 mg at 05/30/24 0815    levothyroxine (SYNTHROID/LEVOTHROID) tablet 75 mcg  75 mcg Oral Daily  Tim Marquez MD   75 mcg at 05/30/24 0816    sertraline (ZOLOFT) tablet 50 mg  50 mg Oral Daily Tim Marquez MD   50 mg at 05/30/24 0816    thiamine (B-1) tablet 100 mg  100 mg Oral Daily Tim Marquez MD   100 mg at 05/30/24 0816       Data   Recent Labs   Lab 05/30/24  0805 05/30/24  0606 05/29/24 2004 05/29/24  1304 05/29/24  0631   WBC  --  4.2  --   --  10.3   HGB  --  12.7  --   --  14.2   MCV  --  97  --   --  95   PLT  --  113*  --   --  141*   NA  --  140  --   --  132*   POTASSIUM  --  2.9*  --   --  3.1*   CHLORIDE  --  103  --   --  87*   CO2  --  24  --   --  18*   BUN  --  6.5  --   --  12.2   CR  --  0.55  --   --  0.55   ANIONGAP  --  13  --   --  27*   MAHSA  --  8.3*  --   --  9.5   * 111* 97   < > 215*   ALBUMIN  --  3.4*  --   --  4.5   PROTTOTAL  --  6.1*  --   --  8.0   BILITOTAL  --  0.7  --   --  1.0   ALKPHOS  --  63  --   --  91   ALT  --  60*  --   --  90*   AST  --  107*  --   --  207*    < > = values in this interval not displayed.       No results found for this or any previous visit (from the past 24 hour(s)).

## 2024-05-30 NOTE — CONSULTS
.  Neurology Consult Note  [facility]       [May 29, 2024]                                                                                       Admission Date: 2024  Hospital Day: 1      Patient: Carley Vazquez      : 1990  MRN:  [id number]     CC:      Chief Complaint   Patient presents with    Seizures       Consult Request:  Referring Provider:  Tim Marquez MD  Primary Care Provider:  No Ref-Primary, Physician MD        HPI:  Carley Vazquez is a 33 year old yo female admitted for multiple witnessed seizures yesterday.  Assisted with her brother on the morning of .  At about 5 AM, her brother and sister-in-law loud sound with her falling on the floor and having jerking movements all over her body.  This went on for about 1 to 2 minutes, and then stop with her not gaining consciousness.  She finally regained consciousness after about half an hour, will need to have another seizure when she was being brought into the ER by EMTs.  Episodes were both described as being generalized tonic-clonic twitching and stiffening episodes.    In talking to the patient, she states that she had another episode about 2 years ago in which she was found to be unconscious, and fell off a chair.  Was found to have multiple injuries including a tongue bite).    In the emergency room patient was given IV Keppra 1 g, along with 10 mg of Valium.    Patient has been very drowsy since dose, and has been somewhat confused.    Heavy alcohol use history and found to have multiple drinks of vodka in the last couple of days.        PAST MEDICAL HISTORY:  ALLERGIES:   Allergies   Allergen Reactions    Bromine      rash    Nickel      rash     Tobacco:    History   Smoking Status    Never   Smokeless Tobacco    Never     Alcohol:  Social History    Substance and Sexual Activity      Alcohol use: Yes        Alcohol/week: 0.0 standard drinks of alcohol        Comment: social    MEDICATIONS:        CURRENTLY SCHEDULED MEDICATIONS   Current Facility-Administered Medications   Medication Dose Route Frequency Provider Last Rate Last Admin    cloNIDine (CATAPRES) tablet 0.1 mg  0.1 mg Oral Q8H Tim Marquez MD   0.1 mg at 05/29/24 1357    [START ON 5/30/2024] folic acid (FOLVITE) tablet 1 mg  1 mg Oral Daily Tim Marquez MD        [START ON 6/5/2024] gabapentin (NEURONTIN) capsule 100 mg  100 mg Oral Q8H Tim Marquez MD        [START ON 6/3/2024] gabapentin (NEURONTIN) capsule 300 mg  300 mg Oral Q8H Tim Marquez MD        [START ON 6/1/2024] gabapentin (NEURONTIN) capsule 600 mg  600 mg Oral Q8H Tim Marquez MD        gabapentin (NEURONTIN) capsule 900 mg  900 mg Oral Q8H Tim Marquez MD   900 mg at 05/29/24 1554    levothyroxine (SYNTHROID/LEVOTHROID) tablet 75 mcg  75 mcg Oral Daily Tim Marquez MD   75 mcg at 05/29/24 1647    sertraline (ZOLOFT) tablet 50 mg  50 mg Oral Daily Tim Marquez MD   50 mg at 05/29/24 1647    [START ON 5/30/2024] thiamine (B-1) tablet 100 mg  100 mg Oral Daily Tim Marquez MD              HOME MEDICATIONS  Medications Prior to Admission   Medication Sig Dispense Refill Last Dose    albuterol (PROAIR HFA/PROVENTIL HFA/VENTOLIN HFA) 108 (90 Base) MCG/ACT inhaler Inhale 2 puffs into the lungs every 6 hours as needed for shortness of breath, wheezing or cough   Unknown at PRN    levothyroxine (SYNTHROID/LEVOTHROID) 75 MCG tablet Take 75 mcg by mouth daily   5/28/2024 at am    sertraline (ZOLOFT) 50 MG tablet Take 50 mg by mouth daily   5/28/2024 at am    traZODone (DESYREL) 50 MG tablet Take  mg by mouth nightly as needed   Past Month at prn     MEDICAL HISTORY  Past Medical History:   Diagnosis Date    NO ACTIVE PROBLEMS      SURGICAL HISTORY  Past Surgical History:   Procedure Laterality Date    APPENDECTOMY OPEN      in 2nd grade    wisdom teeth  2009     FAMILY HISTORY    Family History   Problem Relation Age of Onset    Respiratory Mother         emphysema    Diabetes No  "family hx of     Breast Cancer No family hx of     Cancer - colorectal No family hx of      SOCIAL HISTORY  Social History     Socioeconomic History    Marital status:    Tobacco Use    Smoking status: Never    Smokeless tobacco: Never   Substance and Sexual Activity    Alcohol use: Yes     Alcohol/week: 0.0 standard drinks of alcohol     Comment: social    Drug use: No    Sexual activity: Yes     Partners: Male     Birth control/protection: Condom   Other Topics Concern    Parent/sibling w/ CABG, MI or angioplasty before 65F 55M? No   Social History Narrative    Single, living with brothers family, works at [employer]            Height: 162.6 cm (5' 4\")     Temp: 98  F (36.7  C)   Weight: 59 kg (130 lb)    Temp src: Temporal         BP: (!) 149/102         Estimated body mass index is 22.31 kg/m  as calculated from the following:    Height as of this encounter: 1.626 m (5' 4\").    Weight as of this encounter: 59 kg (130 lb).    Resp: 17   SpO2: 97 %   O2 Device: None (Room air)     Blood Pressure:   BP Readings from Last 3 Encounters:   24 (!) 149/102   02/15/16 123/71   12/01/15 114/64     T24 : Temp (24hrs), Av.3  F (36.8  C), Min:98  F (36.7  C), Max:98.5  F (36.9  C)       GENERAL EXAMINATION:  HEENT: PERRLA, EOMI.  Neck: Supple, No myofascial tender points.    NEUROLOGICAL EXAMINATION:    Level of Consciousness:  Normal.    Orientation:  Alert and oriented to time, place and person.    Memory:  Intact recent and remote memory.    Attention span and Concentration:  Unremarkable.    Language and Speech:  Normal (can name, repeat phrases, good spontaneous speech).    Fund of Knowledge:  Within acceptable range.     Cranial Nerves:  2,3,4,5,6,7,8,9,10,11,12 are unremarkable.     Sensory:  No overt sensory abnormalities to touch, pinprick and vibration and proprioception.      Motor:  No motor weakness.  Muscle strength, tone, bulk unremarkable.  No abnormal movements seen.  Fine motor skills are normal.  "     Coordination: Ataxia and dysmetria noted in bilateral upper and lower extremities.    Balance, Gait and Station:  Balance and gait intact.      Deep Tendon Reflexes:  DTR's (biceps, triceps, brachioradialis, knee jerks, ankle jerks) preserved and symmetric.      Plantar response:  Flexor bilaterally.       .  LABORATORY RESULTS          IMAGING RESULTS     Recent Results (from the past 24 hour(s))   Head CT w/o contrast    Narrative    EXAM: CT HEAD W/O CONTRAST  5/29/2024 8:18 AM     HISTORY:  seizure       COMPARISON:  No prior similar studies    TECHNIQUE: Using multidetector thin collimation helical acquisition  technique, axial, coronal and sagittal CT images from the skull base  to the vertex were obtained without intravenous contrast.   (topogram) image(s) also obtained and reviewed. Dose reduction  techniques were performed.    FINDINGS:  No intracranial hemorrhage, mass effect, or midline shift. No acute  loss of gray-white matter differentiation in the cerebral hemispheres.  Ventricles are proportionate to the cerebral sulci. Clear basal  cisterns.    The bony calvaria and the bones of the skull base are normal. The  visualized portions of the paranasal sinuses and mastoid air cells are  clear. Grossly normal orbits.       Impression    IMPRESSION: No acute intracranial pathology.     KYREE SILVA MD         SYSTEM ID:  [id number]   XR Shoulder Right G/E 3 Views    Narrative    XR SHOULDER RIGHT G/E 3 VIEWS 5/29/2024 8:27 AM     HISTORY: seizure, bruising    COMPARISON: None.         Impression    IMPRESSION: The right glenohumeral and acromioclavicular joints are  negative for fracture or dislocation.    EAMON MORATAYA MD         SYSTEM ID:  [id number]       _____________  _____________________________________________________________________________          ASSESSMENT     Alcohol withdrawal  Generalized tonic-clonic seizures    Patient noted to have 2 generalized tonic-clonic seizures in the  setting of alcohol withdrawal.  Gives a history of having an episode 2 years ago following culture and woken up with tongue bite.  Possible that the previous episode was also generalized tonic-clonic seizure.    Given history of alcohol withdrawal seizure, this should be investigated with MRI and EEG.        RECOMMENDATIONS   Brain MRI  EEG  Depending on results, we could think about restarting Nika Cohen MD   Neurologist, [facility]   May 29, 2024 10:41 PM      A total time of 75 minutes was spent on the care of this patient on the date of the visit patient is at risk of significant deterioration and death due to breakthrough seizure episodes/ neuromuscular failure/respiratory failure.  Please excuse any typographical errors, as this note was generated using a Voice Recognition Software.

## 2024-05-30 NOTE — PROGRESS NOTES
[facility]  [address] [place], [place] [address] [address]      5/30/2024      Carley Vazquez  [address]  [place] [address]      Dear Carley,    Here are some [facility] CHRISSY treatment programs to look into.  Since you completed a CHRISSY Comprehensive Assessment with me, you can let me know which program you would like to attend, and I will fax your assessment to the program of choice for you to get started. I will need you to sign a Release of Information before I fax your assessment. This Release of Information can be sent to you via [employer] (electronic) or via the Unit . If you have questions, my phone number is [phone].    Thanks,  Ana Maria    [facility] CHRISSY Treatment Programs:  [employer]  [address], [address]  [place], [place] [address]  [email or web address]  Phone: [phone]  Fax: [phone]  [email or web address]/treatment-programs/    [facility]   Phone: [phone]  Fax: [phone]  [email or web address]    [place]  Phone: [phone]  Fax:  [phone]  Email: [email or web address]  [email or web address]    [facility]  Main phone: [phone]  Direct Dial: [phone]   Admissions email: [email or web address]   CHRISSY fax: [phone]  [email or web address]    Sober Support Groups:    [place] Recovery Connection (MRC): [facility] connects people seeking recovery to resources that help foster and sustain long-term recovery. Whether you are seeking resources for treatment, transportation, housing, job training, education, health care or other pathways to recovery, [facility] is a great place to start. Phone: [phone]. www.[employer]thonova (Great listing of all types of recovery and non-recovery related resources)      [employer] Recovery: https://www.Biosensia.org/ (Online meetings,  support groups, resources)      Alcoholics Anonymous: 1-800-ALCOHOL  HTTP://WWW.AA.ORG/  AA [place] (270-324-2019 or http://aamin[place].org)  AA [place] (194-076-6391 or www.aastpaul.org)      Narcotics Anonymous: 336.523.1715  www.naminWang[place]ota.us.     [facility]: https://refugerecoverymeetings.org/meeting    [facility] Recovery: https://www.celebraterxG Technology.Calleoo/what-we-offer/find-a-cr-meeting    [facility]:  https://www.Booster Pack/next-steps/find-support/addiction-recovery/    [facility]:  Offers a wide range of different sober support groups and a Sunday Service.  [address] [place] [address]    https://www.[facility].org/#gsc.tab=0        Leah Valerio MA [facility]  CD Evaluation Counselor  [phone]  (Emailed to pt today)

## 2024-05-30 NOTE — PROGRESS NOTES
"  Type Of Assessment: Inpatient Substance Use Comprehensive Assessment    Referral Source:  [facility]  MRN: [id number]    DATE OF SERVICE: May 30, 2024  Date of previous CHRISSY Assessment: n/a  Patient confirmed identity through two factor verification: Full Legal Name and SSN    PATIENT'S NAME: Carley Vazquez  Age: 33 year old  Last 4 SSN: [id number]  Sex: female   Gender Identity: female  Sexual Orientation: Heterosexual  Cultural Background: \"\"  YOB: 1990  Current Address:   [address] [place] [address] [place]   [facility] [address]  Patient Phone Number:  [phone]   Patient's E-Mail Contact:  [email or web address]  Funding: MA pending  PMI: n/a  Emergency Contact: Extended Emergency Contact Information  Primary Emergency Contact: Bogdan Maldonado  Mobile Phone: [phone]  Relation: ex-     AXEL information was provided to patient and patient does not want a copy.     Telemedicine Visit: The patient's condition can be safely assessed and treated via synchronous audio and visual telemedicine encounter.    Reason for Telemedicine Visit: Services only offered telehealth  Originating Site (Patient Location): [facility] - [address], [place] [address]  Distant Site (Provider Location): Provider Remote Setting- Home Office  Consent:  The patient/guardian has verbally consented to: the potential risks and benefits of telemedicine (video visit) versus in person care; bill my insurance or make self-payment for services provided; and responsibility for payment of non-covered services.   Mode of Communication:  telephone    START TIME: 9:54am  END TIME: 10:14am    As the provider I attest to compliance with applicable laws and regulations related to telemedicine.   Carley Vazquez was seen for a substance use disorder consult on 5/30/2024 by RONAL Escamilla.    Reason for Substance Use Disorder Consult:  Pt is interested in CHRISSY treatment " "because \"I want to be good for my family.\"    Are you currently having severe withdrawal symptoms that are putting yourself or others in danger? No  Are you currently having severe medical problems that require immediate attention? No  Are you currently having severe emotional or behavioral problems that are putting yourself or others at risk of harm? No    Have you participated in prior substance use disorder evaluations? No   Have you ever been to detox, inpatient or outpatient treatment for substance related use? List previous treatment: No   Have you ever had a gambling problem or had treatment for compulsive gambling? No  Have you ever felt the need to bet more and more money? No  Have you ever had to lie to people important to you about how much you gambled? No    Patient does not appear to be in severe withdrawal, an imminent safety risk to self or others, or requiring immediate medical attention and may proceed with the assessment interview.  Comprehensive Substance Use History   X X = Primary Drug Used Age of First Use    Pattern of Substance Use   (heaviest use in life and a use history within the past year if applicable) (DSM-5: Sx #3) Date /  Quantity of last use if within the past 30 days Withdrawal Potential?   Method of use  (Oral, smoked, snorted, IV, etc)   x Alcohol   22 HU: current.  Past month: she drinks daily. She will buy a 1.75L of vodka and that lasts about a week.    5/27/24        Marijuana/Hashish   No use        Cocaine/Crack No use        Meth/Amphetamines   No use        Heroin   No use        Other Opiates/Synthetics   No use        Inhalants  No use        Benzodiazepines   No use        Hallucinogens   No use        Barbiturates/Sedatives/Hypnotics   No use        Over-the-Counter Drugs   No use        Other   No use        Nicotine   No use         Withdrawal symptoms: Have you had any of the following withdrawal symptoms?  seizures, tremors, sweats    Have you experienced any " "cravings?  No    Have you had periods of abstinence?  Yes   What was your longest period? During her pregnancies so 18 months.  Any circumstances that lead to relapse? \"Depression and anxiety and loneliness. Upset with the way things are going in life.\"    What activities have you engaged in when using alcohol/other drugs that could be hazardous to you or others?  The patient reported having a history of driving while under the influence of alcohol or drugs.    A description of any risk-taking behavior, including behavior that puts the client at risk of exposure to blood-borne or sexually transmitted diseases: none reported.    Arrests and legal interventions related to substance use: none reported.    A description of how the patient's use affected their ability to function appropriately in a work setting: it did not.    A description of how the patient's use affected their ability to function appropriately in an educational setting: it did not.    Leisure time activities that are associated with substance use: She would drink at home.    Do you think your substance use has become a problem for you? She agrees she has a substance abuse problem.    MEDICAL HISTORY  Physical or medical concerns or diagnoses:   Patient Active Problem List   Diagnosis    CARDIOVASCULAR SCREENING; LDL GOAL LESS THAN 160    Depression with anxiety    Alcohol withdrawal seizure without complication (H)     Do you have any current medical treatment needs not being addressed by inpatient treatment?  no    Do you need a referral for a medical provider? no    Current medications:   Current Facility-Administered Medications   Medication Dose Route Frequency Provider Last Rate Last Admin    dextrose 5% and 0.9% NaCl + KCL 20 mEq/L infusion   Intravenous Continuous Tim Marquez  mL/hr at 05/30/24 0900 Rate Verify at 05/30/24 0900    glucose gel 15-30 g  15-30 g Oral Q15 Min PRN Tim Marquez MD        Or    dextrose 50 % injection 25-50 mL  " 25-50 mL Intravenous Q15 Min PRN Tim Marquez MD        Or    glucagon injection 1 mg  1 mg Subcutaneous Q15 Min PRN Tim Marquez MD        flumazenil (ROMAZICON) injection 0.2 mg  0.2 mg Intravenous q1 min prn Tim Marquez MD        folic acid (FOLVITE) tablet 1 mg  1 mg Oral Daily Tim Marquez MD   1 mg at 05/30/24 0816    [START ON 6/5/2024] gabapentin (NEURONTIN) capsule 100 mg  100 mg Oral Q8H Tim Marquez MD        [START ON 6/3/2024] gabapentin (NEURONTIN) capsule 300 mg  300 mg Oral Q8H Tim Marquez MD        [START ON 6/1/2024] gabapentin (NEURONTIN) capsule 600 mg  600 mg Oral Q8H Tim Marquez MD        gabapentin (NEURONTIN) capsule 900 mg  900 mg Oral Q8H Tim Marquez MD   900 mg at 05/30/24 0815    OLANZapine zydis (zyPREXA) ODT tab 5-10 mg  5-10 mg Oral Q6H PRN Tim Marquez MD        Or    haloperidol lactate (HALDOL) injection 2.5-5 mg  2.5-5 mg Intravenous Q6H PRN Tim Marquez MD        levothyroxine (SYNTHROID/LEVOTHROID) tablet 75 mcg  75 mcg Oral Daily Tim Marquez MD   75 mcg at 05/30/24 0816    LORazepam (ATIVAN) tablet 1-2 mg  1-2 mg Oral Q30 Min PRN Tim Marquez MD   1 mg at 05/29/24 2316    Or    LORazepam (ATIVAN) injection 1-2 mg  1-2 mg Intravenous Q30 Min PRN Tim Marquez MD        melatonin tablet 5 mg  5 mg Oral QPM PRN Tim Marquez MD        ondansetron (ZOFRAN ODT) ODT tab 4 mg  4 mg Oral Q6H PRN Tim Marquez MD        Or    ondansetron (ZOFRAN) injection 4 mg  4 mg Intravenous Q6H PRN Tim Marquez MD        potassium chloride bethany ER (KLOR-CON M20) CR tablet 20 mEq  20 mEq Oral Once Tim Marquez MD        sertraline (ZOLOFT) tablet 50 mg  50 mg Oral Daily Tim Marquez MD   50 mg at 05/30/24 0816    thiamine (B-1) tablet 100 mg  100 mg Oral Daily Tim Marquez MD   100 mg at 05/30/24 0816       Are you pregnant? No    Do you have any specific physical needs/accommodations? No    MENTAL HEALTH HISTORY:  Have you ever had  hospitalizations or treatment for mental health illness: Yes.  "When, Where, and What circumstances: She was working with a therapist until her therapist got pregnant.    Mental health history, including diagnosis and symptoms, and the effect on the client's ability to function: she suspects a dx of depression & anxiety. She reports no formal dx.    Current mental health treatment including psychotropic medication needed to maintain stability: (Note: The assessment must utilize screening tools approved by the commissioner pursuant to section 245.4863 to identify whether the client screens positive for co-occurring disorders): none currently    GAIN-SS Tool:      5/30/2024     9:00 AM   When was the last time that you had significant problems...   with feeling very trapped, lonely, sad, blue, depressed or hopeless about the future? Past month   with sleep trouble, such as bad dreams, sleeping restlessly, or falling asleep during the day? Past Month   with feeling very anxious, nervous, tense, scared, panicked or like something bad was going to happen? Past month   with becoming very distressed & upset when something reminded you of the past? Never   with thinking about ending your life or committing suicide? Never         5/30/2024     9:00 AM   When was the last time that you did the following things 2 or more times?   Lied or conned to get things you wanted or to avoid having to do something? Past month   Had a hard time paying attention at school, work or home? Never   Had a hard time listening to instructions at school, work or home? Never   Were a bully or threatened other people? Never   Started physical fights with other people? Never     Have you ever been verbally, emotionally, physically or sexually abused?   No    Family history of substance use and misuse: \"tons, everybody.\"    The patient's desire for family involvement in the treatment program: not at this time.  Level of family support: \"my sister and brother and my whole immediate family is very " "support.\"    Social network in relation to expected support for recovery: She reports no history of sober support groups.    Are you currently in a significant relationship? No    Do you have any children (include living arrangements/custody/contact)?:  yes, 2 ages 3 and 6. They live with their dad.    What is your current living situation? She lives in an apartment and her kids visit her.    Are you employed/attending school? No, she has been unemployed for a few weeks.    SUMMARY:  Ability to understand written treatment materials: Yes  Ability to understand patient rules and patient rights: Yes  Does the patient recognize needs related to substance use and is willing to follow treatment recommendations: Yes  Does the patient have an opioid use disorder:  does not have a history of opiate use.    ASAM Dimension Scale Ratings:    Dimension 1 -  Acute Intoxication/Withdrawal: 0 - No Problem  Dimension 2 - Biomedical: 0 - No Problem  Dimension 3 - Emotional/Behavioral/Cognitive Conditions: 2 - Moderate Problem  Dimension 4 - Readiness to Change:  2 - Moderate Problem  Dimension 5 - Relapse/Continued Use/ Continued Problem Potential: 3 - Severe Problem  Dimension 6 - Recovery Environment:  3 - Severe Problem    Category of Substance Severity (ICD-10 Code / DSM 5 Code)     Alcohol Use Disorder Severe  (10.20) (303.90)   Cannabis Use Disorder The patient does not meet the criteria for a Cannabis use disorder.   Hallucinogen Use Disorder The patient does not meet the criteria for a Hallucinogen use disorder.   Inhalant Use Disorder The patient does not meet the criteria for an Inhalant use disorder.   Opioid Use Disorder The patient does not meet the criteria for an Opioid use disorder.   Sedative, Hypnotic, or Anxiolytic Use Disorder The patient does not meet the criteria for a Sedative/Hypnotic use disorder.   Stimulant Related Disorder The patient does not meet the criteria for a Stimulant use disorder.   Tobacco Use " Disorder The patient does not meet the criteria for a Tobacco use disorder.   Other (or unknown) Substance Use Disorder The patient does not meet the criteria for a Other (or unknown) Substance use disorder.     A problematic pattern of alcohol/drug use leading to clinically significant impairment or distress, as manifested by at least two of the following, occurring within a 12-month period:    1.) Alcohol/drug is often taken in larger amounts or over a longer period than was intended.  2.) There is a persistent desire or unsuccessful efforts to cut down or control alcohol/drug use  3.) A great deal of time is spent in activities necessary to obtain alcohol, use alcohol, or recover from its effects.  6.) Continued alcohol use despite having persistent or recurrent social or interpersonal problems caused or exacerbated by the effects of alcohol/drug.  7.) Important social, occupational, or recreational activities are given up or reduced because of alcohol/drug use.  8.) Recurrent alcohol/drug use in situations in which it is physically hazardous.  10.) Tolerance, as defined by either of the following: A need for markedly increased amounts of alcohol/drug to achieve intoxication or desired effect.  11.) Withdrawal, as manifested by either of the following: The characteristic withdrawal syndrome for alcohol/drug (refer to Criteria A and B of the criteria set for alcohol/drug withdrawal).    Specify if: In early remission:  After full criteria for alcohol/drug use disorder were previously met, none of the criteria for alcohol/drug use disorder have been met for at least 3 months but for less than 12 months (with the exception that Criterion A4,  Craving or a strong desire or urge to use alcohol/drug  may be met).     In sustained remission:   After full criteria for alcohol use disorder were previously met, non of the criteria for alcohol/drug use disorder have been met at any time during a period of 12 months or  longer (with the exception that Criterion A4,  Craving or strong desire or urge to use alcohol/drug  may be met).     Specify if:   This additional specifier is used if the individual is in an environment where access to alcohol is restricted.    Mild: Presence of 2-3 symptoms  Moderate: Presence of 4-5 symptoms  Severe: Presence of 6 or more symptoms    Collateral information:   The patient's medical record at [facility] was reviewed and the information contained in the medical record supported the patient's account of her chemical use history and chemical use consequences.    Recommendations:   1)  Complete an Intensive Outpatient CD Treatment Program.  2)  Abstain from all mood-altering chemicals unless prescribed by a licensed provider.   3)  Attend, at minimum, 2 weekly support group meetings, such as 12 step based (AA/NA), [facility], [facility] Realizations, and/or [facility] meetings.     4)  Actively work with a female mentor/sponsor on a weekly basis.   5)  Follow all the recommendations of your treatment/medical providers.    Clinical Substantiation:    Pt's drinking has increased over the past year. She is starting to recognize that she has a problem with alcohol. She has never attended a CHRISSY treatment program before nor has she attended a sober support group before. She is open to [facility] CHRISSY treatment.    Referrals/ Alternatives:    [facility] [employer] [facility]  [address] [facility], [address]  [place], [place] [address]  [email or web address]  Phone: [phone]  Fax: [phone]  [email or web address]    [facility]   Phone: [phone]  Fax: [phone]  [email or web address]    [facility] [place]  Phone: [phone]  Fax:  [phone]  Email: [email or web address]  [email or web address]    [facility]  Main phone: [phone]  Direct Dial:  [phone]   Admissions email: [email or web address]   CHRISSY fax: [phone]  [email or web address]    CHRISSY consult completed by: RONAL Escamilla.  Phone Number: [phone]  E-mail Address: [email or web address]  [address] [place] [address]     *Due to regulation of Title 42 of the Code of Federal Regulations (CFR) Part 2: Confidentiality laws apply to this note and the information wherein.  Thus, this note cannot be copy and pasted into any other health care staff's note nor can it be included in general medical records sent to ANY outside agency without the patient's written consent.

## 2024-05-31 VITALS
TEMPERATURE: 97.7 F | DIASTOLIC BLOOD PRESSURE: 87 MMHG | BODY MASS INDEX: 20.37 KG/M2 | OXYGEN SATURATION: 97 % | WEIGHT: 119.3 LBS | SYSTOLIC BLOOD PRESSURE: 139 MMHG | HEIGHT: 64 IN | RESPIRATION RATE: 16 BRPM | HEART RATE: 87 BPM

## 2024-05-31 LAB
ALBUMIN SERPL BCG-MCNC: 3.6 G/DL (ref 3.5–5.2)
ALP SERPL-CCNC: 64 U/L (ref 40–150)
ALT SERPL W P-5'-P-CCNC: 62 U/L (ref 0–50)
ANION GAP SERPL CALCULATED.3IONS-SCNC: 13 MMOL/L (ref 7–15)
AST SERPL W P-5'-P-CCNC: 81 U/L (ref 0–45)
BILIRUB SERPL-MCNC: 0.8 MG/DL
BUN SERPL-MCNC: 4.4 MG/DL (ref 6–20)
CALCIUM SERPL-MCNC: 8.2 MG/DL (ref 8.6–10)
CHLORIDE SERPL-SCNC: 102 MMOL/L (ref 98–107)
CREAT SERPL-MCNC: 0.4 MG/DL (ref 0.51–0.95)
DEPRECATED HCO3 PLAS-SCNC: 22 MMOL/L (ref 22–29)
EGFRCR SERPLBLD CKD-EPI 2021: >90 ML/MIN/1.73M2
ERYTHROCYTE [DISTWIDTH] IN BLOOD BY AUTOMATED COUNT: 12.1 % (ref 10–15)
GLUCOSE SERPL-MCNC: 95 MG/DL (ref 70–99)
HCT VFR BLD AUTO: 41.9 % (ref 35–47)
HGB BLD-MCNC: 14 G/DL (ref 11.7–15.7)
MCH RBC QN AUTO: 31.9 PG (ref 26.5–33)
MCHC RBC AUTO-ENTMCNC: 33.4 G/DL (ref 31.5–36.5)
MCV RBC AUTO: 95 FL (ref 78–100)
PLATELET # BLD AUTO: 121 10E3/UL (ref 150–450)
POTASSIUM SERPL-SCNC: 3.7 MMOL/L (ref 3.4–5.3)
PROT SERPL-MCNC: 6.6 G/DL (ref 6.4–8.3)
RBC # BLD AUTO: 4.39 10E6/UL (ref 3.8–5.2)
SODIUM SERPL-SCNC: 137 MMOL/L (ref 135–145)
WBC # BLD AUTO: 4.6 10E3/UL (ref 4–11)

## 2024-05-31 PROCEDURE — 258N000001 HC RX 258: Performed by: HOSPITALIST

## 2024-05-31 PROCEDURE — 85014 HEMATOCRIT: CPT | Performed by: HOSPITALIST

## 2024-05-31 PROCEDURE — 80053 COMPREHEN METABOLIC PANEL: CPT | Performed by: HOSPITALIST

## 2024-05-31 PROCEDURE — 250N000013 HC RX MED GY IP 250 OP 250 PS 637: Performed by: HOSPITALIST

## 2024-05-31 PROCEDURE — 36415 COLL VENOUS BLD VENIPUNCTURE: CPT | Performed by: HOSPITALIST

## 2024-05-31 PROCEDURE — 99239 HOSP IP/OBS DSCHRG MGMT >30: CPT | Performed by: INTERNAL MEDICINE

## 2024-05-31 RX ADMIN — LEVOTHYROXINE SODIUM 75 MCG: 0.07 TABLET ORAL at 08:04

## 2024-05-31 RX ADMIN — SERTRALINE HYDROCHLORIDE 50 MG: 50 TABLET ORAL at 08:04

## 2024-05-31 RX ADMIN — THIAMINE HCL TAB 100 MG 100 MG: 100 TAB at 08:04

## 2024-05-31 RX ADMIN — FOLIC ACID 1 MG: 1 TABLET ORAL at 08:04

## 2024-05-31 RX ADMIN — POTASSIUM CHLORIDE, DEXTROSE MONOHYDRATE AND SODIUM CHLORIDE: 150; 5; 900 INJECTION, SOLUTION INTRAVENOUS at 08:07

## 2024-05-31 ASSESSMENT — ACTIVITIES OF DAILY LIVING (ADL)
ADLS_ACUITY_SCORE: 25

## 2024-05-31 NOTE — PLAN OF CARE
"To Do:  End of Shift Summary  For vital signs and complete assessments, please see documentation flowsheets.     Pertinent assessments: Pt A/O. VSS. Denies pain, nausea and SOB.  CIWA 0 and 0. Neuro intact. No seizure noted. Up SBA.   Major Shift Events New PIV  Treatment Plan: Symptom management, seizure prec, CIWA, Neuro and poss discharge today if EEG and MRI normal.  Bedside Nurse: Monika Oconnor RN       Problem: Adult Inpatient Plan of Care  Goal: Plan of Care Review  Description: The Plan of Care Review/Shift note should be completed every shift.  The Outcome Evaluation is a brief statement about your assessment that the patient is improving, declining, or no change.  This information will be displayed automatically on your shift  note.  Outcome: Progressing  Flowsheets (Taken 5/31/2024 6565)  Outcome Evaluation: CIWA 0 and no seizure noted.  Plan of Care Reviewed With: patient  Overall Patient Progress: improving  Goal: Patient-Specific Goal (Individualized)  Description: You can add care plan individualizations to a care plan. Examples of Individualization might be:  \"Parent requests to be called daily at 9am for status\", \"I have a hard time hearing out of my right ear\", or \"Do not touch me to wake me up as it startles  me\".  Outcome: Progressing  Goal: Absence of Hospital-Acquired Illness or Injury  Outcome: Progressing  Intervention: Identify and Manage Fall Risk  Recent Flowsheet Documentation  Taken 5/30/2024 2319 by Monika Oconnor, RN  Safety Promotion/Fall Prevention:   activity supervised   clutter free environment maintained   increase visualization of patient   nonskid shoes/slippers when out of bed   safety round/check completed   room near nurse's station  Intervention: Prevent Skin Injury  Recent Flowsheet Documentation  Taken 5/30/2024 2319 by Monika Oconnor, RN  Body Position: position changed independently  Intervention: Prevent and Manage VTE (Venous Thromboembolism) Risk  Recent Flowsheet " Documentation  Taken 5/30/2024 2319 by Monika Oconnor RN  VTE Prevention/Management: SCDs (sequential compression devices) off  Goal: Optimal Comfort and Wellbeing  Outcome: Progressing  Goal: Readiness for Transition of Care  Outcome: Progressing     Problem: Alcohol Withdrawal  Goal: Alcohol Withdrawal Symptom Control  Outcome: Progressing  Intervention: Minimize or Manage Alcohol Withdrawal Symptoms  Recent Flowsheet Documentation  Taken 5/30/2024 2319 by Monika Oconnor, RN  Seizure Precautions:   clutter-free environment maintained   side rails padded  Goal: Optimal Neurologic Function  Outcome: Progressing  Intervention: Prevent Seizure-Related Injury  Recent Flowsheet Documentation  Taken 5/30/2024 2319 by Monika Oconnor RN  Seizure Precautions:   clutter-free environment maintained   side rails padded  Goal: Readiness for Change Identified  Outcome: Progressing     Problem: Comorbidity Management  Goal: Maintenance of Seizure Control  Outcome: Progressing  Intervention: Maintain Seizure Symptom Control  Recent Flowsheet Documentation  Taken 5/30/2024 2319 by Monika Oconnor, RN  Seizure Precautions:   clutter-free environment maintained   side rails padded  Medication Review/Management: medications reviewed   Goal Outcome Evaluation:      Plan of Care Reviewed With: patient    Overall Patient Progress: improvingOverall Patient Progress: improving    Outcome Evaluation: CIWA 0 and no seizure noted.       Size Of Lesion In Cm (Optional): 0 Detail Level: Detailed

## 2024-05-31 NOTE — DISCHARGE SUMMARY
"[facility]  Discharge Summary    Admit date:  5/29/2024    Discharge date and time: 5/31/2024    Discharge Physician: Escobar Azul MD    Primary care provider: No Ref-Primary, Physician    Primary Discharge Diagnosis      Alcohol Withdrawal Seizure    Secondary Diagnoses     Alcoholism  GEORGINA  MDD  Thrombocytopenia secondary to alcohol use  Alcoholic Hepatitis  Hyponatremia  Hypokalemia  Hypothyroidism    Summary of Hospital Stay     33F with hx of alcoholism, GEORGINA, and MDD presented after several seizures due to alcohol withdrawal. She was admitted to the ICU for alcohol withdrawal treatment. Neurology was consulted and did not recommend starting AED at this time, recommended alcohol cessation. Patient is planning to pursue an outpatient alcohol cessation program. Also went up on patient's Zoloft as she feels GEORGINA and MDD is contributing to her alcohol abuse. Needs to follow-up with PCP.    Patient Discharge Condition & Exam     Discharge condition: Improved    /87 (BP Location: Left arm)   Pulse 87   Temp 97.7  F (36.5  C) (Oral)   Resp 16   Ht 1.626 m (5' 4\")   Wt 54.1 kg (119 lb 4.8 oz)   SpO2 97%   BMI 20.48 kg/m       General: In NAD.  Cardiac: RRR.  Lungs: CTAB.  Abd: Non-tender.  Ext: No edema.    Discharge Instructions     Patient/family instructions: Written discharge instruction given to patient/family    Discharge Medications:     Review of your medicines        CONTINUE these medicines which may have CHANGED, or have new prescriptions. If we are uncertain of the size of tablets/capsules you have at home, strength may be listed as something that might have changed.        Dose / Directions   sertraline 50 MG tablet  Commonly known as: ZOLOFT  This may have changed: how much to take      Dose: 100 mg  Take 2 tablets (100 mg) by mouth daily  Quantity: 30 tablet  Refills: 6            CONTINUE these medicines which have NOT CHANGED        Dose / Directions   albuterol 108 (90 Base) " MCG/ACT inhaler  Commonly known as: PROAIR HFA/PROVENTIL HFA/VENTOLIN HFA      Dose: 2 puff  Inhale 2 puffs into the lungs every 6 hours as needed for shortness of breath, wheezing or cough  Refills: 0     levothyroxine 75 MCG tablet  Commonly known as: SYNTHROID/LEVOTHROID      Dose: 75 mcg  Take 75 mcg by mouth daily  Refills: 0     traZODone 50 MG tablet  Commonly known as: DESYREL      Dose:  mg  Take  mg by mouth nightly as needed  Refills: 0               Where to get your medicines        These medications were sent to [facility] [address] [facility] - [place], [place] - [address]  [address] [place] [address]      Phone: [phone]   sertraline 50 MG tablet        Discharge diet: regular diet    Discharge activity: Activity as tolerated    Discharge follow-up:    Follow up with primary care provider within one week or earlier if symptoms return or gets worse.    Follow up with consultant as instructed.    Pending Results     Unresulted Labs Ordered in the Past 30 Days of this Admission       No orders found from 4/29/2024 to 5/30/2024.             Patient Allergies     Allergies   Allergen Reactions    Bromine      rash    Nickel      rash       Disposition   Disposition: home     I saw and evaluated the patient on day of discharge and  discharge instructions reviewed  and  all the patient's questions and concerns addressed. Over 30 minutes spent on discharge and coordination of discharge process for this patient.

## 2024-05-31 NOTE — PLAN OF CARE
"End of Shift Summary  For vital signs and complete assessments, please see documentation flowsheets.     Pertinent assessments: patient admitted to floor, VSS except for mild htn at 135\56, CIWA at zero, denies pain, all systems assessments unremarkable. A little slow to respond but alert and oriented. D5NS +20meq K running at 100mL/hr. Standby assist, bed alarm on. Independent of personal cares but self neglect in past.     Major Shift Events admitted to floor, continued on IVF, voided. MRI and EEG completed.     Treatment Plan: continue to monitor electrolytes, monitor for seizure activity, await results of MRI and EEG and wait for MD decisions.     Bedside Nurse: David Iniguez RN     Goal Outcome Evaluation:      Problem: Adult Inpatient Plan of Care  Goal: Plan of Care Review  Description: The Plan of Care Review/Shift note should be completed every shift.  The Outcome Evaluation is a brief statement about your assessment that the patient is improving, declining, or no change.  This information will be displayed automatically on your shift  note.  Outcome: Progressing  Flowsheets (Taken 5/30/2024 5933)  Outcome Evaluation: VSS, alert and oriented, steady on feet, voiding normally, continued on IVF. monitored for seizures.  Overall Patient Progress: improving  Goal: Patient-Specific Goal (Individualized)  Description: You can add care plan individualizations to a care plan. Examples of Individualization might be:  \"Parent requests to be called daily at 9am for status\", \"I have a hard time hearing out of my right ear\", or \"Do not touch me to wake me up as it startles  me\".  Outcome: Progressing  Goal: Absence of Hospital-Acquired Illness or Injury  Outcome: Progressing  Intervention: Identify and Manage Fall Risk  Recent Flowsheet Documentation  Taken 5/30/2024 1842 by David Iniguez, RN  Safety Promotion/Fall Prevention: safety round/check completed  Intervention: Prevent Skin Injury  Recent Flowsheet " Documentation  Taken 5/30/2024 1842 by David Iniguez RN  Body Position: position changed independently  Skin Protection: incontinence pads utilized  Device Skin Pressure Protection: tubing/devices free from skin contact  Intervention: Prevent Infection  Recent Flowsheet Documentation  Taken 5/30/2024 1842 by David Iniguez RN  Infection Prevention:   hand hygiene promoted   equipment surfaces disinfected   personal protective equipment utilized  Goal: Optimal Comfort and Wellbeing  Outcome: Progressing  Goal: Readiness for Transition of Care  Outcome: Progressing     Problem: Alcohol Withdrawal  Goal: Alcohol Withdrawal Symptom Control  Outcome: Progressing  Intervention: Minimize or Manage Alcohol Withdrawal Symptoms  Recent Flowsheet Documentation  Taken 5/30/2024 1842 by David Iniguez RN  Seizure Precautions:   clutter-free environment maintained   side rails padded   soft boundaries provided   emergency equipment at bedside  Goal: Optimal Neurologic Function  Outcome: Progressing  Intervention: Prevent Seizure-Related Injury  Recent Flowsheet Documentation  Taken 5/30/2024 1842 by David Iniguez RN  Seizure Precautions:   clutter-free environment maintained   side rails padded   soft boundaries provided   emergency equipment at bedside  Goal: Readiness for Change Identified  Outcome: Progressing     Problem: Comorbidity Management  Goal: Maintenance of Seizure Control  Outcome: Progressing  Intervention: Maintain Seizure Symptom Control  Recent Flowsheet Documentation  Taken 5/30/2024 1842 by David Iniguez RN  Seizure Precautions:   clutter-free environment maintained   side rails padded   soft boundaries provided   emergency equipment at bedside  Medication Review/Management: medications reviewed              Overall Patient Progress: improvingOverall Patient Progress: improving    Outcome Evaluation: VSS, alert and oriented, steady on feet, voiding normally, continued on IVF.  monitored for seizures.

## 2024-06-24 ENCOUNTER — HOSPITAL ENCOUNTER (OUTPATIENT)
Facility: CLINIC | Age: 34
Setting detail: OBSERVATION
Discharge: HOME | End: 2024-06-26
Attending: EMERGENCY MEDICINE | Admitting: STUDENT IN AN ORGANIZED HEALTH CARE EDUCATION/TRAINING PROGRAM
Payer: MEDICAID

## 2024-06-24 DIAGNOSIS — F10.90 ALCOHOL USE DISORDER: Primary | ICD-10-CM

## 2024-06-24 DIAGNOSIS — F10.930 ALCOHOL WITHDRAWAL, UNCOMPLICATED (H): ICD-10-CM

## 2024-06-24 LAB
ALBUMIN SERPL BCG-MCNC: 4.6 G/DL (ref 3.5–5.2)
ALBUMIN UR-MCNC: 200 MG/DL
ALP SERPL-CCNC: 65 U/L (ref 40–150)
ALT SERPL W P-5'-P-CCNC: 62 U/L (ref 0–50)
ANION GAP SERPL CALCULATED.3IONS-SCNC: 19 MMOL/L (ref 7–15)
APPEARANCE UR: CLEAR
AST SERPL W P-5'-P-CCNC: 86 U/L (ref 0–45)
B-OH-BUTYR SERPL-SCNC: 0.3 MMOL/L
BASOPHILS # BLD AUTO: 0.1 10E3/UL (ref 0–0.2)
BASOPHILS NFR BLD AUTO: 1 %
BILIRUB SERPL-MCNC: 0.7 MG/DL
BILIRUB UR QL STRIP: NEGATIVE
BUN SERPL-MCNC: 9.7 MG/DL (ref 6–20)
CALCIUM SERPL-MCNC: 9.1 MG/DL (ref 8.6–10)
CHLORIDE SERPL-SCNC: 99 MMOL/L (ref 98–107)
COLOR UR AUTO: YELLOW
CREAT SERPL-MCNC: 0.45 MG/DL (ref 0.51–0.95)
CREAT SERPL-MCNC: 0.45 MG/DL (ref 0.51–0.95)
DEPRECATED HCO3 PLAS-SCNC: 21 MMOL/L (ref 22–29)
EGFRCR SERPLBLD CKD-EPI 2021: >90 ML/MIN/1.73M2
EGFRCR SERPLBLD CKD-EPI 2021: >90 ML/MIN/1.73M2
EOSINOPHIL # BLD AUTO: 0 10E3/UL (ref 0–0.7)
EOSINOPHIL NFR BLD AUTO: 0 %
ERYTHROCYTE [DISTWIDTH] IN BLOOD BY AUTOMATED COUNT: 13.1 % (ref 10–15)
ETHANOL SERPL-MCNC: 0.14 G/DL
GLUCOSE SERPL-MCNC: 71 MG/DL (ref 70–99)
GLUCOSE UR STRIP-MCNC: NEGATIVE MG/DL
HCT VFR BLD AUTO: 40 % (ref 35–47)
HGB BLD-MCNC: 13.5 G/DL (ref 11.7–15.7)
HGB UR QL STRIP: ABNORMAL
HOLD SPECIMEN: NORMAL
HOLD SPECIMEN: NORMAL
IMM GRANULOCYTES # BLD: 0 10E3/UL
IMM GRANULOCYTES NFR BLD: 0 %
KETONES UR STRIP-MCNC: ABNORMAL MG/DL
LACTATE SERPL-SCNC: 1.3 MMOL/L (ref 0.7–2)
LACTATE SERPL-SCNC: 2.7 MMOL/L (ref 0.7–2)
LEUKOCYTE ESTERASE UR QL STRIP: ABNORMAL
LYMPHOCYTES # BLD AUTO: 1.2 10E3/UL (ref 0.8–5.3)
LYMPHOCYTES NFR BLD AUTO: 17 %
MAGNESIUM SERPL-MCNC: 2.3 MG/DL (ref 1.7–2.3)
MAGNESIUM SERPL-MCNC: 2.3 MG/DL (ref 1.7–2.3)
MCH RBC QN AUTO: 31.9 PG (ref 26.5–33)
MCHC RBC AUTO-ENTMCNC: 33.8 G/DL (ref 31.5–36.5)
MCV RBC AUTO: 95 FL (ref 78–100)
MONOCYTES # BLD AUTO: 0.5 10E3/UL (ref 0–1.3)
MONOCYTES NFR BLD AUTO: 8 %
NEUTROPHILS # BLD AUTO: 5 10E3/UL (ref 1.6–8.3)
NEUTROPHILS NFR BLD AUTO: 73 %
NITRATE UR QL: NEGATIVE
NRBC # BLD AUTO: 0 10E3/UL
NRBC BLD AUTO-RTO: 0 /100
PH UR STRIP: 7 [PH] (ref 5–7)
PHOSPHATE SERPL-MCNC: 4.5 MG/DL (ref 2.5–4.5)
PHOSPHATE SERPL-MCNC: 4.5 MG/DL (ref 2.5–4.5)
PLATELET # BLD AUTO: 150 10E3/UL (ref 150–450)
POTASSIUM SERPL-SCNC: 4.3 MMOL/L (ref 3.4–5.3)
PROT SERPL-MCNC: 8.1 G/DL (ref 6.4–8.3)
RBC # BLD AUTO: 4.23 10E6/UL (ref 3.8–5.2)
RBC URINE: 5 /HPF
SODIUM SERPL-SCNC: 139 MMOL/L (ref 135–145)
SP GR UR STRIP: 1.02 (ref 1–1.03)
SQUAMOUS EPITHELIAL: 3 /HPF
UROBILINOGEN UR STRIP-MCNC: 4 MG/DL
WBC # BLD AUTO: 6.8 10E3/UL (ref 4–11)
WBC URINE: 2 /HPF

## 2024-06-24 PROCEDURE — 250N000011 HC RX IP 250 OP 636: Mod: JZ | Performed by: STUDENT IN AN ORGANIZED HEALTH CARE EDUCATION/TRAINING PROGRAM

## 2024-06-24 PROCEDURE — 85025 COMPLETE CBC W/AUTO DIFF WBC: CPT | Performed by: EMERGENCY MEDICINE

## 2024-06-24 PROCEDURE — 250N000013 HC RX MED GY IP 250 OP 250 PS 637: Performed by: EMERGENCY MEDICINE

## 2024-06-24 PROCEDURE — 96361 HYDRATE IV INFUSION ADD-ON: CPT

## 2024-06-24 PROCEDURE — 82077 ASSAY SPEC XCP UR&BREATH IA: CPT | Performed by: EMERGENCY MEDICINE

## 2024-06-24 PROCEDURE — 120N000013 HC R&B IMCU

## 2024-06-24 PROCEDURE — 258N000003 HC RX IP 258 OP 636: Mod: JZ | Performed by: EMERGENCY MEDICINE

## 2024-06-24 PROCEDURE — 99285 EMERGENCY DEPT VISIT HI MDM: CPT | Mod: 25

## 2024-06-24 PROCEDURE — 83605 ASSAY OF LACTIC ACID: CPT | Performed by: STUDENT IN AN ORGANIZED HEALTH CARE EDUCATION/TRAINING PROGRAM

## 2024-06-24 PROCEDURE — 96375 TX/PRO/DX INJ NEW DRUG ADDON: CPT

## 2024-06-24 PROCEDURE — 258N000003 HC RX IP 258 OP 636: Mod: JZ | Performed by: STUDENT IN AN ORGANIZED HEALTH CARE EDUCATION/TRAINING PROGRAM

## 2024-06-24 PROCEDURE — 84100 ASSAY OF PHOSPHORUS: CPT | Performed by: STUDENT IN AN ORGANIZED HEALTH CARE EDUCATION/TRAINING PROGRAM

## 2024-06-24 PROCEDURE — 250N000011 HC RX IP 250 OP 636: Performed by: EMERGENCY MEDICINE

## 2024-06-24 PROCEDURE — 84100 ASSAY OF PHOSPHORUS: CPT | Performed by: EMERGENCY MEDICINE

## 2024-06-24 PROCEDURE — 93005 ELECTROCARDIOGRAM TRACING: CPT

## 2024-06-24 PROCEDURE — 36415 COLL VENOUS BLD VENIPUNCTURE: CPT | Performed by: STUDENT IN AN ORGANIZED HEALTH CARE EDUCATION/TRAINING PROGRAM

## 2024-06-24 PROCEDURE — 36415 COLL VENOUS BLD VENIPUNCTURE: CPT | Performed by: EMERGENCY MEDICINE

## 2024-06-24 PROCEDURE — 96374 THER/PROPH/DIAG INJ IV PUSH: CPT

## 2024-06-24 PROCEDURE — 84450 TRANSFERASE (AST) (SGOT): CPT | Performed by: EMERGENCY MEDICINE

## 2024-06-24 PROCEDURE — 96372 THER/PROPH/DIAG INJ SC/IM: CPT | Mod: XS | Performed by: STUDENT IN AN ORGANIZED HEALTH CARE EDUCATION/TRAINING PROGRAM

## 2024-06-24 PROCEDURE — 83735 ASSAY OF MAGNESIUM: CPT | Performed by: EMERGENCY MEDICINE

## 2024-06-24 PROCEDURE — 99222 1ST HOSP IP/OBS MODERATE 55: CPT | Performed by: STUDENT IN AN ORGANIZED HEALTH CARE EDUCATION/TRAINING PROGRAM

## 2024-06-24 PROCEDURE — 83735 ASSAY OF MAGNESIUM: CPT | Performed by: STUDENT IN AN ORGANIZED HEALTH CARE EDUCATION/TRAINING PROGRAM

## 2024-06-24 PROCEDURE — 250N000013 HC RX MED GY IP 250 OP 250 PS 637: Performed by: INTERNAL MEDICINE

## 2024-06-24 PROCEDURE — 82247 BILIRUBIN TOTAL: CPT | Performed by: EMERGENCY MEDICINE

## 2024-06-24 PROCEDURE — 81001 URINALYSIS AUTO W/SCOPE: CPT | Performed by: EMERGENCY MEDICINE

## 2024-06-24 PROCEDURE — 87040 BLOOD CULTURE FOR BACTERIA: CPT | Performed by: EMERGENCY MEDICINE

## 2024-06-24 PROCEDURE — 82010 KETONE BODYS QUAN: CPT | Performed by: STUDENT IN AN ORGANIZED HEALTH CARE EDUCATION/TRAINING PROGRAM

## 2024-06-24 RX ORDER — GABAPENTIN 300 MG/1
300 CAPSULE ORAL EVERY 8 HOURS
Status: DISCONTINUED | OUTPATIENT
Start: 2024-06-30 | End: 2024-06-26 | Stop reason: HOSPADM

## 2024-06-24 RX ORDER — OLANZAPINE 5 MG/1
5-10 TABLET, ORALLY DISINTEGRATING ORAL EVERY 6 HOURS PRN
Status: DISCONTINUED | OUTPATIENT
Start: 2024-06-24 | End: 2024-06-24

## 2024-06-24 RX ORDER — GABAPENTIN 100 MG/1
100 CAPSULE ORAL EVERY 8 HOURS
Status: DISCONTINUED | OUTPATIENT
Start: 2024-07-02 | End: 2024-06-24

## 2024-06-24 RX ORDER — SODIUM CHLORIDE, SODIUM LACTATE, POTASSIUM CHLORIDE, CALCIUM CHLORIDE 600; 310; 30; 20 MG/100ML; MG/100ML; MG/100ML; MG/100ML
INJECTION, SOLUTION INTRAVENOUS CONTINUOUS
Status: DISCONTINUED | OUTPATIENT
Start: 2024-06-24 | End: 2024-06-26 | Stop reason: HOSPADM

## 2024-06-24 RX ORDER — GABAPENTIN 600 MG/1
1200 TABLET ORAL ONCE
Status: COMPLETED | OUTPATIENT
Start: 2024-06-24 | End: 2024-06-24

## 2024-06-24 RX ORDER — AMOXICILLIN 250 MG
1 CAPSULE ORAL 2 TIMES DAILY PRN
Status: DISCONTINUED | OUTPATIENT
Start: 2024-06-24 | End: 2024-06-26 | Stop reason: HOSPADM

## 2024-06-24 RX ORDER — GABAPENTIN 300 MG/1
600 CAPSULE ORAL EVERY 8 HOURS
Status: DISCONTINUED | OUTPATIENT
Start: 2024-06-28 | End: 2024-06-26 | Stop reason: HOSPADM

## 2024-06-24 RX ORDER — CLONIDINE HYDROCHLORIDE 0.1 MG/1
0.1 TABLET ORAL EVERY 8 HOURS
Status: DISCONTINUED | OUTPATIENT
Start: 2024-06-24 | End: 2024-06-26 | Stop reason: HOSPADM

## 2024-06-24 RX ORDER — MULTIPLE VITAMINS W/ MINERALS TAB 9MG-400MCG
1 TAB ORAL DAILY
Status: DISCONTINUED | OUTPATIENT
Start: 2024-06-24 | End: 2024-06-26 | Stop reason: HOSPADM

## 2024-06-24 RX ORDER — LEVOTHYROXINE SODIUM 75 UG/1
75 TABLET ORAL DAILY
Status: DISCONTINUED | OUTPATIENT
Start: 2024-06-25 | End: 2024-06-26 | Stop reason: HOSPADM

## 2024-06-24 RX ORDER — LORAZEPAM 1 MG/1
1-2 TABLET ORAL EVERY 30 MIN PRN
Status: DISCONTINUED | OUTPATIENT
Start: 2024-06-24 | End: 2024-06-26 | Stop reason: HOSPADM

## 2024-06-24 RX ORDER — GABAPENTIN 300 MG/1
900 CAPSULE ORAL EVERY 8 HOURS
Status: DISCONTINUED | OUTPATIENT
Start: 2024-06-25 | End: 2024-06-26 | Stop reason: HOSPADM

## 2024-06-24 RX ORDER — ACETAMINOPHEN AND CODEINE PHOSPHATE 120; 12 MG/5ML; MG/5ML
0.35 SOLUTION ORAL DAILY
Status: ON HOLD | COMMUNITY
End: 2024-09-04

## 2024-06-24 RX ORDER — GABAPENTIN 300 MG/1
300 CAPSULE ORAL EVERY 8 HOURS
Status: DISCONTINUED | OUTPATIENT
Start: 2024-06-30 | End: 2024-06-24

## 2024-06-24 RX ORDER — LORAZEPAM 2 MG/ML
1-2 INJECTION INTRAMUSCULAR EVERY 30 MIN PRN
Status: DISCONTINUED | OUTPATIENT
Start: 2024-06-24 | End: 2024-06-26 | Stop reason: HOSPADM

## 2024-06-24 RX ORDER — FLUMAZENIL 0.1 MG/ML
0.2 INJECTION, SOLUTION INTRAVENOUS
Status: DISCONTINUED | OUTPATIENT
Start: 2024-06-24 | End: 2024-06-26 | Stop reason: HOSPADM

## 2024-06-24 RX ORDER — FOLIC ACID 1 MG/1
1 TABLET ORAL DAILY
Status: DISCONTINUED | OUTPATIENT
Start: 2024-06-24 | End: 2024-06-26 | Stop reason: HOSPADM

## 2024-06-24 RX ORDER — AMOXICILLIN 250 MG
2 CAPSULE ORAL 2 TIMES DAILY PRN
Status: DISCONTINUED | OUTPATIENT
Start: 2024-06-24 | End: 2024-06-26 | Stop reason: HOSPADM

## 2024-06-24 RX ORDER — GABAPENTIN 300 MG/1
600 CAPSULE ORAL EVERY 8 HOURS
Status: DISCONTINUED | OUTPATIENT
Start: 2024-06-28 | End: 2024-06-24

## 2024-06-24 RX ORDER — CALCIUM CARBONATE 500 MG/1
1000 TABLET, CHEWABLE ORAL 4 TIMES DAILY PRN
Status: DISCONTINUED | OUTPATIENT
Start: 2024-06-24 | End: 2024-06-26 | Stop reason: HOSPADM

## 2024-06-24 RX ORDER — OLANZAPINE 5 MG/1
5-10 TABLET, ORALLY DISINTEGRATING ORAL EVERY 6 HOURS PRN
Status: DISCONTINUED | OUTPATIENT
Start: 2024-06-24 | End: 2024-06-26 | Stop reason: HOSPADM

## 2024-06-24 RX ORDER — DIAZEPAM 10 MG/2ML
5 INJECTION, SOLUTION INTRAMUSCULAR; INTRAVENOUS ONCE
Status: COMPLETED | OUTPATIENT
Start: 2024-06-24 | End: 2024-06-24

## 2024-06-24 RX ORDER — FLUMAZENIL 0.1 MG/ML
0.2 INJECTION, SOLUTION INTRAVENOUS
Status: DISCONTINUED | OUTPATIENT
Start: 2024-06-24 | End: 2024-06-24

## 2024-06-24 RX ORDER — HALOPERIDOL 5 MG/ML
2.5-5 INJECTION INTRAMUSCULAR EVERY 6 HOURS PRN
Status: DISCONTINUED | OUTPATIENT
Start: 2024-06-24 | End: 2024-06-24

## 2024-06-24 RX ORDER — GABAPENTIN 600 MG/1
1200 TABLET ORAL ONCE
Status: DISCONTINUED | OUTPATIENT
Start: 2024-06-24 | End: 2024-06-24

## 2024-06-24 RX ORDER — LORAZEPAM 1 MG/1
1-2 TABLET ORAL EVERY 30 MIN PRN
Status: DISCONTINUED | OUTPATIENT
Start: 2024-06-24 | End: 2024-06-24

## 2024-06-24 RX ORDER — HALOPERIDOL 5 MG/ML
2.5-5 INJECTION INTRAMUSCULAR EVERY 6 HOURS PRN
Status: DISCONTINUED | OUTPATIENT
Start: 2024-06-24 | End: 2024-06-26 | Stop reason: HOSPADM

## 2024-06-24 RX ORDER — ACETAMINOPHEN 325 MG/1
650 TABLET ORAL EVERY 4 HOURS PRN
Status: DISCONTINUED | OUTPATIENT
Start: 2024-06-24 | End: 2024-06-26 | Stop reason: HOSPADM

## 2024-06-24 RX ORDER — LORAZEPAM 2 MG/ML
1-2 INJECTION INTRAMUSCULAR EVERY 30 MIN PRN
Status: DISCONTINUED | OUTPATIENT
Start: 2024-06-24 | End: 2024-06-24

## 2024-06-24 RX ORDER — HYDRALAZINE HYDROCHLORIDE 20 MG/ML
10 INJECTION INTRAMUSCULAR; INTRAVENOUS EVERY 6 HOURS PRN
Status: DISCONTINUED | OUTPATIENT
Start: 2024-06-24 | End: 2024-06-26 | Stop reason: HOSPADM

## 2024-06-24 RX ORDER — LIDOCAINE 40 MG/G
CREAM TOPICAL
Status: DISCONTINUED | OUTPATIENT
Start: 2024-06-24 | End: 2024-06-26 | Stop reason: HOSPADM

## 2024-06-24 RX ORDER — VALPROIC ACID 250 MG/1
500 CAPSULE, LIQUID FILLED ORAL AT BEDTIME
Status: CANCELLED | OUTPATIENT
Start: 2024-06-24

## 2024-06-24 RX ORDER — GABAPENTIN 300 MG/1
900 CAPSULE ORAL EVERY 8 HOURS
Status: DISCONTINUED | OUTPATIENT
Start: 2024-06-25 | End: 2024-06-24

## 2024-06-24 RX ORDER — CLONIDINE HYDROCHLORIDE 0.1 MG/1
0.1 TABLET ORAL EVERY 8 HOURS
Status: DISCONTINUED | OUTPATIENT
Start: 2024-06-24 | End: 2024-06-24

## 2024-06-24 RX ORDER — LIDOCAINE 40 MG/G
CREAM TOPICAL
Status: DISCONTINUED | OUTPATIENT
Start: 2024-06-24 | End: 2024-06-25

## 2024-06-24 RX ORDER — VALPROIC ACID 250 MG/1
250 CAPSULE, LIQUID FILLED ORAL 2 TIMES DAILY
Status: CANCELLED | OUTPATIENT
Start: 2024-06-25

## 2024-06-24 RX ORDER — ENOXAPARIN SODIUM 100 MG/ML
40 INJECTION SUBCUTANEOUS EVERY 24 HOURS
Status: DISCONTINUED | OUTPATIENT
Start: 2024-06-24 | End: 2024-06-26 | Stop reason: HOSPADM

## 2024-06-24 RX ORDER — GABAPENTIN 100 MG/1
100 CAPSULE ORAL EVERY 8 HOURS
Status: DISCONTINUED | OUTPATIENT
Start: 2024-07-02 | End: 2024-06-26 | Stop reason: HOSPADM

## 2024-06-24 RX ADMIN — CLONIDINE HYDROCHLORIDE 0.1 MG: 0.1 TABLET ORAL at 17:56

## 2024-06-24 RX ADMIN — SODIUM CHLORIDE, POTASSIUM CHLORIDE, SODIUM LACTATE AND CALCIUM CHLORIDE 1584 ML: 600; 310; 30; 20 INJECTION, SOLUTION INTRAVENOUS at 22:38

## 2024-06-24 RX ADMIN — DIAZEPAM 5 MG: 5 INJECTION, SOLUTION INTRAMUSCULAR; INTRAVENOUS at 17:55

## 2024-06-24 RX ADMIN — FOLIC ACID 1 MG: 1 TABLET ORAL at 17:56

## 2024-06-24 RX ADMIN — SODIUM CHLORIDE, POTASSIUM CHLORIDE, SODIUM LACTATE AND CALCIUM CHLORIDE: 600; 310; 30; 20 INJECTION, SOLUTION INTRAVENOUS at 19:45

## 2024-06-24 RX ADMIN — ENOXAPARIN SODIUM 40 MG: 40 INJECTION SUBCUTANEOUS at 22:39

## 2024-06-24 RX ADMIN — PHENOBARBITAL SODIUM 260 MG: 130 INJECTION INTRAMUSCULAR at 19:44

## 2024-06-24 RX ADMIN — Medication 1 TABLET: at 17:56

## 2024-06-24 RX ADMIN — GABAPENTIN 1200 MG: 600 TABLET, FILM COATED ORAL at 17:58

## 2024-06-24 RX ADMIN — ACETAMINOPHEN 650 MG: 325 TABLET, FILM COATED ORAL at 23:26

## 2024-06-24 RX ADMIN — THIAMINE HCL TAB 100 MG 100 MG: 100 TAB at 17:56

## 2024-06-24 ASSESSMENT — LIFESTYLE VARIABLES
NAUSEA AND VOMITING: NO NAUSEA AND NO VOMITING
VISUAL DISTURBANCES: NOT PRESENT
NAUSEA AND VOMITING: MILD NAUSEA WITH NO VOMITING
ANXIETY: NO ANXIETY, AT EASE
TOTAL SCORE: 4
HEADACHE, FULLNESS IN HEAD: NOT PRESENT
VISUAL DISTURBANCES: NOT PRESENT
HEADACHE, FULLNESS IN HEAD: NOT PRESENT
ANXIETY: MILDLY ANXIOUS
AGITATION: NORMAL ACTIVITY
TOTAL SCORE: 5
AUDITORY DISTURBANCES: NOT PRESENT
HEADACHE, FULLNESS IN HEAD: NOT PRESENT
TREMOR: 2
ORIENTATION AND CLOUDING OF SENSORIUM: ORIENTED AND CAN DO SERIAL ADDITIONS
TOTAL SCORE: 2
PAROXYSMAL SWEATS: NO SWEAT VISIBLE
ORIENTATION AND CLOUDING OF SENSORIUM: ORIENTED AND CAN DO SERIAL ADDITIONS
NAUSEA AND VOMITING: NO NAUSEA AND NO VOMITING
AGITATION: NORMAL ACTIVITY
TREMOR: 3
VISUAL DISTURBANCES: NOT PRESENT
AUDITORY DISTURBANCES: NOT PRESENT
TOTAL SCORE: 0
TREMOR: NO TREMOR
PAROXYSMAL SWEATS: NO SWEAT VISIBLE
ANXIETY: MILDLY ANXIOUS
PAROXYSMAL SWEATS: NO SWEAT VISIBLE
VISUAL DISTURBANCES: NOT PRESENT
TREMOR: NOT VISIBLE, BUT CAN BE FELT FINGERTIP TO FINGERTIP
NAUSEA AND VOMITING: NO NAUSEA AND NO VOMITING
ORIENTATION AND CLOUDING OF SENSORIUM: ORIENTED AND CAN DO SERIAL ADDITIONS
AUDITORY DISTURBANCES: NOT PRESENT
HEADACHE, FULLNESS IN HEAD: NOT PRESENT
AGITATION: NORMAL ACTIVITY
AUDITORY DISTURBANCES: NOT PRESENT
AGITATION: NORMAL ACTIVITY
ORIENTATION AND CLOUDING OF SENSORIUM: ORIENTED AND CAN DO SERIAL ADDITIONS
ANXIETY: 2
PAROXYSMAL SWEATS: NO SWEAT VISIBLE

## 2024-06-24 ASSESSMENT — ACTIVITIES OF DAILY LIVING (ADL)
ADLS_ACUITY_SCORE: 23
ADLS_ACUITY_SCORE: 38
ADLS_ACUITY_SCORE: 23
ADLS_ACUITY_SCORE: 38

## 2024-06-24 ASSESSMENT — COLUMBIA-SUICIDE SEVERITY RATING SCALE - C-SSRS
2. HAVE YOU ACTUALLY HAD ANY THOUGHTS OF KILLING YOURSELF IN THE PAST MONTH?: NO
6. HAVE YOU EVER DONE ANYTHING, STARTED TO DO ANYTHING, OR PREPARED TO DO ANYTHING TO END YOUR LIFE?: NO
1. IN THE PAST MONTH, HAVE YOU WISHED YOU WERE DEAD OR WISHED YOU COULD GO TO SLEEP AND NOT WAKE UP?: NO

## 2024-06-24 NOTE — ED NOTES
[facility]  ED Nurse Handoff Report    ED Chief complaint: Alcohol Problem  . ED Diagnosis:   Final diagnoses:   None       Allergies:   Allergies   Allergen Reactions    Bromine      rash    Nickel      rash       Code Status: Full Code    Activity level - Baseline/Home:  independent.  Activity Level - Current:   standby.   Lift room needed: No.   Bariatric: No   Needed: No   Isolation: No.   Infection: Not Applicable.     Respiratory status: Room air    Vital Signs (within 30 minutes):   Vitals:    06/24/24 1801 06/24/24 1816 06/24/24 1818 06/24/24 1828   BP: (!) 162/109  (!) 160/109 (!) 153/114   Pulse: 101 104 108 (!) 122   Resp:       Temp:       TempSrc:       SpO2: 98% 96% 97% 96%   Weight:       Height:           Cardiac Rhythm:  ,      Pain level:    Patient confused: No.   Patient Falls Risk: patient and family education.   Elimination Status: Has voided     Patient Report - Initial Complaint: ETOH.   Focused Assessment: HPI   Carley Vazquez is a 33 year old female who presents with an alcohol problem. Patient notes that she is seeking alcohol detox. She states that she had a few alcohol withdrawal seizure episodes a few weeks ago that caused her to be admitted to [facility] for four days. She states that she resumed her alcohol consumption since. She states that she is unsure about the number of drinks she has, but states that she typically drinks more than the single beer she states she had today. Patient's family states she drank a case of beer and a handle. She notes that she also drinks vodka but not more than 750 mL. She states that she has been waking up shaky during mornings and endorses anxiety. She reports that she is compliant with thyroid medications and sertraline, but notes that she is unsure that sertraline helps with her anxiety. She denies nausea.      Clinical [place] Withdrawal Assessment of Alcohol, RevisedNausea and Vomiting: No nausea and no  vomitingTactile Disturbances: NoneTremor: No tremorAuditory Disturbances: Not presentParoxysmal Sweats: No sweat visibleVisual Disturbances: Not presentAnxiety: No anxiety, at easeHeadache, Fullness in Head: Not presentAgitation: Normal activityOrientation and Clouding of Sensorium: Oriented and can do serial additionsCIWA-Ar Total: 0      Chemical Abuse/AddictionsAlcohol: Social; Binge; DailyLast Use:: 06/24/24     Abnormal Results:   Labs Ordered and Resulted from Time of ED Arrival to Time of ED Departure   COMPREHENSIVE METABOLIC PANEL - Abnormal       Result Value    Sodium 139      Potassium 4.3      Carbon Dioxide (CO2) 21 (*)     Anion Gap 19 (*)     Urea Nitrogen 9.7      Creatinine 0.45 (*)     GFR Estimate >90      Calcium 9.1      Chloride 99      Glucose 71      Alkaline Phosphatase 65      AST 86 (*)     ALT 62 (*)     Protein Total 8.1      Albumin 4.6      Bilirubin Total 0.7     ETHYL ALCOHOL LEVEL - Abnormal    Alcohol ethyl 0.14 (*)    PHOSPHORUS - Normal    Phosphorus 4.5     MAGNESIUM - Normal    Magnesium 2.3     CBC WITH PLATELETS AND DIFFERENTIAL    WBC Count 6.8      RBC Count 4.23      Hemoglobin 13.5      Hematocrit 40.0      MCV 95      MCH 31.9      MCHC 33.8      RDW 13.1      Platelet Count 150      % Neutrophils 73      % Lymphocytes 17      % Monocytes 8      % Eosinophils 0      % Basophils 1      % Immature Granulocytes 0      NRBCs per 100 WBC 0      Absolute Neutrophils 5.0      Absolute Lymphocytes 1.2      Absolute Monocytes 0.5      Absolute Eosinophils 0.0      Absolute Basophils 0.1      Absolute Immature Granulocytes 0.0      Absolute NRBCs 0.0          No orders to display       Treatments provided: see MAR, and see vital trend  Family Comments: family at bedside  OBS brochure/video discussed/provided to patient:  No  ED Medications:   Medications   cloNIDine (CATAPRES) tablet 0.1 mg (0.1 mg Oral $Given 6/24/24 3612)   OLANZapine zydis (zyPREXA) ODT tab 5-10 mg (has no  administration in time range)     Or   haloperidol lactate (HALDOL) injection 2.5-5 mg (has no administration in time range)   flumazenil (ROMAZICON) injection 0.2 mg (has no administration in time range)   melatonin tablet 5 mg (has no administration in time range)   gabapentin (NEURONTIN) capsule 900 mg (has no administration in time range)   gabapentin (NEURONTIN) capsule 600 mg (has no administration in time range)   gabapentin (NEURONTIN) capsule 300 mg (has no administration in time range)   gabapentin (NEURONTIN) capsule 100 mg (has no administration in time range)   LORazepam (ATIVAN) tablet 1-2 mg (has no administration in time range)     Or   LORazepam (ATIVAN) injection 1-2 mg (has no administration in time range)   thiamine (B-1) tablet 100 mg (100 mg Oral $Given 6/24/24 1756)   folic acid (FOLVITE) tablet 1 mg (1 mg Oral $Given 6/24/24 1756)   multivitamin w/minerals (THERA-VIT-M) tablet 1 tablet (1 tablet Oral $Given 6/24/24 1756)   gabapentin (NEURONTIN) tablet 1,200 mg (1,200 mg Oral $Given 6/24/24 1758)   diazepam (VALIUM) injection 5 mg (5 mg Intravenous $Given 6/24/24 1755)       Drips infusing:  No  For the majority of the shift this patient was Green.   Interventions performed were n/a.    Sepsis treatment initiated: No    Cares/treatment/interventions/medications to be completed following ED care: n/a    ED Nurse Name: Verito Lee RN  6:55 PM

## 2024-06-25 LAB
ALBUMIN SERPL BCG-MCNC: 3.7 G/DL (ref 3.5–5.2)
ALP SERPL-CCNC: 54 U/L (ref 40–150)
ALT SERPL W P-5'-P-CCNC: 43 U/L (ref 0–50)
ANION GAP SERPL CALCULATED.3IONS-SCNC: 14 MMOL/L (ref 7–15)
AST SERPL W P-5'-P-CCNC: 50 U/L (ref 0–45)
ATRIAL RATE - MUSE: 98 BPM
BILIRUB SERPL-MCNC: 1 MG/DL
BUN SERPL-MCNC: 9.8 MG/DL (ref 6–20)
CALCIUM SERPL-MCNC: 8.7 MG/DL (ref 8.6–10)
CHLORIDE SERPL-SCNC: 98 MMOL/L (ref 98–107)
CREAT SERPL-MCNC: 0.44 MG/DL (ref 0.51–0.95)
DEPRECATED HCO3 PLAS-SCNC: 24 MMOL/L (ref 22–29)
DIASTOLIC BLOOD PRESSURE - MUSE: NORMAL MMHG
EGFRCR SERPLBLD CKD-EPI 2021: >90 ML/MIN/1.73M2
ERYTHROCYTE [DISTWIDTH] IN BLOOD BY AUTOMATED COUNT: 13 % (ref 10–15)
GLUCOSE BLDC GLUCOMTR-MCNC: 85 MG/DL (ref 70–99)
GLUCOSE SERPL-MCNC: 76 MG/DL (ref 70–99)
HCT VFR BLD AUTO: 39.6 % (ref 35–47)
HGB BLD-MCNC: 13.1 G/DL (ref 11.7–15.7)
INTERPRETATION ECG - MUSE: NORMAL
MAGNESIUM SERPL-MCNC: 2.1 MG/DL (ref 1.7–2.3)
MCH RBC QN AUTO: 32.2 PG (ref 26.5–33)
MCHC RBC AUTO-ENTMCNC: 33.1 G/DL (ref 31.5–36.5)
MCV RBC AUTO: 97 FL (ref 78–100)
P AXIS - MUSE: 69 DEGREES
PHOSPHATE SERPL-MCNC: 4.6 MG/DL (ref 2.5–4.5)
PLATELET # BLD AUTO: 120 10E3/UL (ref 150–450)
POTASSIUM SERPL-SCNC: 3.5 MMOL/L (ref 3.4–5.3)
PR INTERVAL - MUSE: 118 MS
PROT SERPL-MCNC: 6.6 G/DL (ref 6.4–8.3)
QRS DURATION - MUSE: 86 MS
QT - MUSE: 360 MS
QTC - MUSE: 459 MS
R AXIS - MUSE: 63 DEGREES
RBC # BLD AUTO: 4.07 10E6/UL (ref 3.8–5.2)
SODIUM SERPL-SCNC: 136 MMOL/L (ref 135–145)
SYSTOLIC BLOOD PRESSURE - MUSE: NORMAL MMHG
T AXIS - MUSE: 34 DEGREES
VENTRICULAR RATE- MUSE: 98 BPM
WBC # BLD AUTO: 3 10E3/UL (ref 4–11)

## 2024-06-25 PROCEDURE — 99232 SBSQ HOSP IP/OBS MODERATE 35: CPT | Performed by: STUDENT IN AN ORGANIZED HEALTH CARE EDUCATION/TRAINING PROGRAM

## 2024-06-25 PROCEDURE — 250N000013 HC RX MED GY IP 250 OP 250 PS 637: Performed by: STUDENT IN AN ORGANIZED HEALTH CARE EDUCATION/TRAINING PROGRAM

## 2024-06-25 PROCEDURE — 96361 HYDRATE IV INFUSION ADD-ON: CPT

## 2024-06-25 PROCEDURE — 250N000011 HC RX IP 250 OP 636: Mod: JZ | Performed by: STUDENT IN AN ORGANIZED HEALTH CARE EDUCATION/TRAINING PROGRAM

## 2024-06-25 PROCEDURE — 250N000013 HC RX MED GY IP 250 OP 250 PS 637: Performed by: EMERGENCY MEDICINE

## 2024-06-25 PROCEDURE — G0378 HOSPITAL OBSERVATION PER HR: HCPCS

## 2024-06-25 PROCEDURE — 84100 ASSAY OF PHOSPHORUS: CPT | Performed by: STUDENT IN AN ORGANIZED HEALTH CARE EDUCATION/TRAINING PROGRAM

## 2024-06-25 PROCEDURE — 83735 ASSAY OF MAGNESIUM: CPT | Performed by: STUDENT IN AN ORGANIZED HEALTH CARE EDUCATION/TRAINING PROGRAM

## 2024-06-25 PROCEDURE — 258N000003 HC RX IP 258 OP 636: Performed by: STUDENT IN AN ORGANIZED HEALTH CARE EDUCATION/TRAINING PROGRAM

## 2024-06-25 PROCEDURE — 80053 COMPREHEN METABOLIC PANEL: CPT | Performed by: STUDENT IN AN ORGANIZED HEALTH CARE EDUCATION/TRAINING PROGRAM

## 2024-06-25 PROCEDURE — 36415 COLL VENOUS BLD VENIPUNCTURE: CPT | Performed by: STUDENT IN AN ORGANIZED HEALTH CARE EDUCATION/TRAINING PROGRAM

## 2024-06-25 PROCEDURE — 85027 COMPLETE CBC AUTOMATED: CPT | Performed by: STUDENT IN AN ORGANIZED HEALTH CARE EDUCATION/TRAINING PROGRAM

## 2024-06-25 PROCEDURE — 250N000013 HC RX MED GY IP 250 OP 250 PS 637: Performed by: INTERNAL MEDICINE

## 2024-06-25 PROCEDURE — 96372 THER/PROPH/DIAG INJ SC/IM: CPT | Performed by: STUDENT IN AN ORGANIZED HEALTH CARE EDUCATION/TRAINING PROGRAM

## 2024-06-25 RX ORDER — POTASSIUM CHLORIDE 1500 MG/1
20 TABLET, EXTENDED RELEASE ORAL ONCE
Status: COMPLETED | OUTPATIENT
Start: 2024-06-25 | End: 2024-06-25

## 2024-06-25 RX ADMIN — FOLIC ACID 1 MG: 1 TABLET ORAL at 08:44

## 2024-06-25 RX ADMIN — THIAMINE HCL TAB 100 MG 100 MG: 100 TAB at 08:44

## 2024-06-25 RX ADMIN — Medication 1 TABLET: at 08:44

## 2024-06-25 RX ADMIN — CLONIDINE HYDROCHLORIDE 0.1 MG: 0.1 TABLET ORAL at 02:05

## 2024-06-25 RX ADMIN — ACETAMINOPHEN 650 MG: 325 TABLET, FILM COATED ORAL at 13:51

## 2024-06-25 RX ADMIN — POTASSIUM CHLORIDE 20 MEQ: 1500 TABLET, EXTENDED RELEASE ORAL at 08:44

## 2024-06-25 RX ADMIN — ENOXAPARIN SODIUM 40 MG: 40 INJECTION SUBCUTANEOUS at 21:30

## 2024-06-25 RX ADMIN — SODIUM CHLORIDE, POTASSIUM CHLORIDE, SODIUM LACTATE AND CALCIUM CHLORIDE: 600; 310; 30; 20 INJECTION, SOLUTION INTRAVENOUS at 15:00

## 2024-06-25 RX ADMIN — GABAPENTIN 900 MG: 300 CAPSULE ORAL at 02:04

## 2024-06-25 RX ADMIN — GABAPENTIN 900 MG: 300 CAPSULE ORAL at 08:43

## 2024-06-25 RX ADMIN — GABAPENTIN 900 MG: 300 CAPSULE ORAL at 17:27

## 2024-06-25 RX ADMIN — LEVOTHYROXINE SODIUM 75 MCG: 0.07 TABLET ORAL at 08:44

## 2024-06-25 RX ADMIN — CLONIDINE HYDROCHLORIDE 0.1 MG: 0.1 TABLET ORAL at 08:44

## 2024-06-25 RX ADMIN — SODIUM CHLORIDE, POTASSIUM CHLORIDE, SODIUM LACTATE AND CALCIUM CHLORIDE: 600; 310; 30; 20 INJECTION, SOLUTION INTRAVENOUS at 04:59

## 2024-06-25 RX ADMIN — CLONIDINE HYDROCHLORIDE 0.1 MG: 0.1 TABLET ORAL at 17:27

## 2024-06-25 ASSESSMENT — ACTIVITIES OF DAILY LIVING (ADL)
ADLS_ACUITY_SCORE: 23

## 2024-06-25 NOTE — PROGRESS NOTES
[facility]    Medicine Progress Note - Hospitalist Service    Date of Admission:  6/24/2024    Assessment & Plan     Carley Vazquez is a 33 year old female with past medical history significant for severe alcohol use disorder with history of recent admission for complicated alcohol withdrawal, including alcohol withdrawal related seizures, who presented to [facility] on 6/24/2024 with acute alcohol intoxication and early alcohol withdrawal.     Severe Alcohol Use Disorder  Acute Alcohol Intoxication  Acute Alcohol Withdrawal  Hx Alcohol Withdrawal Related Seizures  Patient with a history of complicated alcohol withdrawal, including recent admission 5/29/2024-5/31/2024 requiring ICU level cares for withdrawal related seizure activity. Presenting on 6/24/2024 after continued alcohol use, including a significant amount of vodka + beer daily. She had an alcohol level of 0.14, but was already entering into early withdrawal. After discussion with emergency department physician, decision was made to provide Phenobarbital 260mg loading in attempt to avoid progression to complicated alcohol withdrawal. She was monitored in the emergency department for 1 hour after without significant respiratory depression, and [place] [facility] [place]  Upon admission she was a started on gabapentin taper, clonidine, CIWA protocol along with as needed Ativan.  Patient appeared better, her CIWA scoring is 2-4.  Discussed chemical dependency consultation with the patient.  She is not interested to meet with CD in the hospital.  She said that her family is working to set her up with a rehab.  She does not know which rehab but thinks that it is in [place].  She has never been in the rehab before.  She said that her alcohol use recently escalated since last year when she got .    -Continue CIWA protocol  -Trigger dose benzos  -Clonidine 0.1mg TID  -Gabapentin taper      Anion Gap Metabolic Acidosis 2/2 Lactic Acidosis-resolved  Bicarb 21 w/ anion gap 19. Lactic acid 2.7. Likely volume down in setting of severe alcohol use.  Improved with IV fluids     Transaminitis---impronimg  ALT 62, AST 86. This is consistent with prior of ALT 62, AST 81 on 05/31/2024. Tbili 0.7. Encourage alcohol cessation.     Hypertension  Blood pressures 140-160s / 90-100s. Suspect related to acute alcohol withdrawal. Monitoring closely. Hydralazine PRN.     Leukocytopenia  Chronic thrombocytopenia   -Continue to monitor CBC  Diet: Combination Diet Regular Diet Adult    DVT Prophylaxis: Low Risk/Ambulatory with no VTE prophylaxis indicated  Toussaint Catheter: Not present  Lines: None     Cardiac Monitoring: None  Code Status: Full Code      Clinically Significant Risk Factors Present on Admission             # Anion Gap Metabolic Acidosis: Highest Anion Gap = 19 mmol/L in last 2 days, will monitor and treat as appropriate    # Thrombocytopenia: Lowest platelets = 120 in last 2 days, will monitor for bleeding                          Disposition Plan     Medically Ready for Discharge: Anticipated Tomorrow             Susan Peterson MD  Hospitalist Service  [facility]  Securely message with [employer] (more info)  Text page via [employer] Paging/Directory   ______________________________________________________________________    Interval History     No acute events overnight.  Denied any symptoms of palpitation, diaphoresis, anxiety, or tremors.  Has never been in the rehab before.  Her family is supportive and is working to set her up with a rehab in [place].  She does not know the details yet.  She declined to see chemical dependency here in the hospital.  4 point review of system is otherwise negative      Physical Exam   Vital Signs: Temp: 98.1  F (36.7  C) Temp src: Oral BP: (!) 143/66 Pulse: 80   Resp: 18 SpO2: 100 % O2 Device: None (Room air)    Weight: 116 lbs 12.8 oz    Constitutional:  awake, alert, cooperative, sitting in bed, no apparent distress, and appears stated age  Eyes: Anicteric sclera, linear abrasion over the left eye  ENT: normocephalic, without obvious abnormality  Respiratory: No increased work of breathing, good air exchange, clear to auscultation bilaterally, no crackles or wheezing  Cardiovascular: Normal rate, regular rhythm, no murmur  GI: Soft, nontender, nondistended  Musculoskeletal: no lower extremity pitting edema present  Neurologic: Awake, alert, oriented to name, place and time.  Moving all 4 extremities  Neuropsychiatric: Appropriate mood and affect    Medical Decision Making       45 MINUTES SPENT BY ME on the date of service doing chart review, history, exam, documentation & further activities per the note.      Data   ------------------------- PAST 24 HR DATA REVIEWED -----------------------------------------------

## 2024-06-25 NOTE — PLAN OF CARE
"PRIMARY DIAGNOSIS: \"GENERIC\" NURSING  OUTPATIENT/OBSERVATION GOALS TO BE MET BEFORE DISCHARGE:  ADLs back to baseline: Yes    Activity and level of assistance: Up with standby assistance.    Pain status: Improved-controlled with oral pain medications.    Return to near baseline physical activity: Yes     Discharge Planner Nurse   Safe discharge environment identified: Yes  Barriers to discharge: Yes       Entered by: Marguerite Batista RN 06/25/2024 5:12 PM     Please review provider order for any additional goals.   Nurse to notify provider when observation goals have been met and patient is ready for discharge.    Goal Outcome Evaluation:      Plan of Care Reviewed With: patient    Overall Patient Progress: improvingOverall Patient Progress: improving    Outcome Evaluation: CIWA score of 0      Problem: Adult Inpatient Plan of Care  Goal: Plan of Care Review  Description: The Plan of Care Review/Shift note should be completed every shift.  The Outcome Evaluation is a brief statement about your assessment that the patient is improving, declining, or no change.  This information will be displayed automatically on your shift  note.  6/25/2024 1712 by Marguerite Batista RN  Outcome: Progressing  Flowsheets (Taken 6/25/2024 1712)  Outcome Evaluation: CIWA score of 0  Plan of Care Reviewed With: patient  Overall Patient Progress: improving  6/25/2024 1427 by Marguerite Batista RN  Outcome: Progressing  Flowsheets (Taken 6/25/2024 1424)  Outcome Evaluation: CIWA score of 2, pt reports feeling \"normal\"  Plan of Care Reviewed With: patient  Overall Patient Progress: improving  Goal: Patient-Specific Goal (Individualized)  Description: You can add care plan individualizations to a care plan. Examples of Individualization might be:  \"Parent requests to be called daily at 9am for status\", \"I have a hard time hearing out of my right ear\", or \"Do not touch me to wake me up as it startles  me\".  6/25/2024 1712 by Marguerite Batista, " RN  Outcome: Progressing  6/25/2024 1427 by Marguerite Batista RN  Outcome: Progressing  Goal: Absence of Hospital-Acquired Illness or Injury  6/25/2024 1712 by Marguerite Batista RN  Outcome: Progressing  6/25/2024 1427 by Marguerite Batista RN  Outcome: Progressing  Intervention: Identify and Manage Fall Risk  Recent Flowsheet Documentation  Taken 6/25/2024 1540 by Marguerite Batista RN  Safety Promotion/Fall Prevention: safety round/check completed  Taken 6/25/2024 1400 by Marguerite Batista RN  Safety Promotion/Fall Prevention: safety round/check completed  Taken 6/25/2024 0953 by Marguerite Batista RN  Safety Promotion/Fall Prevention: safety round/check completed  Taken 6/25/2024 0840 by Marguerite Batista RN  Safety Promotion/Fall Prevention:   safety round/check completed   room organization consistent   patient and family education   nonskid shoes/slippers when out of bed   clutter free environment maintained   assistive device/personal items within reach   activity supervised  Intervention: Prevent Skin Injury  Recent Flowsheet Documentation  Taken 6/25/2024 0840 by Marguerite Batista RN  Body Position: position changed independently  Goal: Optimal Comfort and Wellbeing  6/25/2024 1712 by Marguerite Batista RN  Outcome: Progressing  6/25/2024 1427 by Marguerite Batista RN  Outcome: Progressing  Goal: Readiness for Transition of Care  6/25/2024 1712 by Marguerite Batista RN  Outcome: Progressing  6/25/2024 1427 by Marguerite Batista RN  Outcome: Progressing     Problem: Alcohol Withdrawal  Goal: Alcohol Withdrawal Symptom Control  6/25/2024 1712 by Marguerite Batista RN  Outcome: Progressing  6/25/2024 1427 by Marguerite Batista RN  Outcome: Progressing  Intervention: Minimize or Manage Alcohol Withdrawal Symptoms  Recent Flowsheet Documentation  Taken 6/25/2024 0840 by Marguerite Batista RN  Seizure Precautions:   activity supervised   clutter-free environment maintained   emergency equipment at bedside   side rails  padded  Goal: Optimal Neurologic Function  6/25/2024 1712 by Marguerite Batista RN  Outcome: Progressing  6/25/2024 1427 by Marguerite Batista RN  Outcome: Progressing  Intervention: Prevent Seizure-Related Injury  Recent Flowsheet Documentation  Taken 6/25/2024 0840 by Marguerite Batista RN  Seizure Precautions:   activity supervised   clutter-free environment maintained   emergency equipment at bedside   side rails padded  Goal: Readiness for Change Identified  6/25/2024 1712 by Marguerite Batista RN  Outcome: Progressing  6/25/2024 1427 by Marguerite Batista RN  Outcome: Progressing     Problem: Comorbidity Management  Goal: Maintenance of Seizure Control  6/25/2024 1712 by Marguerite Batista RN  Outcome: Progressing  6/25/2024 1427 by Marguerite Batista RN  Outcome: Progressing  Intervention: Maintain Seizure Symptom Control  Recent Flowsheet Documentation  Taken 6/25/2024 0840 by Marguerite Batista RN  Seizure Precautions:   activity supervised   clutter-free environment maintained   emergency equipment at bedside   side rails padded  Medication Review/Management: medications reviewed  Goal: Blood Pressure in Desired Range  6/25/2024 1712 by Marguerite Batista RN  Outcome: Progressing  6/25/2024 1427 by Marguerite Batista RN  Outcome: Progressing  Intervention: Maintain Blood Pressure Management  Recent Flowsheet Documentation  Taken 6/25/2024 0840 by Marguerite Batista RN  Medication Review/Management: medications reviewed     Problem: Skin Injury Risk Increased  Goal: Skin Health and Integrity  6/25/2024 1712 by Marguerite Batista RN  Outcome: Progressing  6/25/2024 1427 by Marguerite Batista RN  Outcome: Progressing  Intervention: Optimize Skin Protection  Recent Flowsheet Documentation  Taken 6/25/2024 0840 by Marguerite Batista RN  Activity Management:   ambulated to bathroom   back to bed  Head of Bed (HOB) Positioning: HOB at 15 degrees     Problem: Fall Injury Risk  Goal: Absence of Fall and Fall-Related  Injury  6/25/2024 1712 by Marguerite Batista RN  Outcome: Progressing  6/25/2024 1427 by Marguerite Batista RN  Outcome: Progressing  Intervention: Identify and Manage Contributors  Recent Flowsheet Documentation  Taken 6/25/2024 0840 by Marguerite Batista RN  Medication Review/Management: medications reviewed  Intervention: Promote Injury-Free Environment  Recent Flowsheet Documentation  Taken 6/25/2024 1540 by Marguerite Batista RN  Safety Promotion/Fall Prevention: safety round/check completed  Taken 6/25/2024 1400 by Marguerite Batista RN  Safety Promotion/Fall Prevention: safety round/check completed  Taken 6/25/2024 0953 by Marguerite Batista RN  Safety Promotion/Fall Prevention: safety round/check completed  Taken 6/25/2024 0840 by Marguerite Batista RN  Safety Promotion/Fall Prevention:   safety round/check completed   room organization consistent   patient and family education   nonskid shoes/slippers when out of bed   clutter free environment maintained   assistive device/personal items within reach   activity supervised

## 2024-06-25 NOTE — H&P
[facility]    History and Physical - Hospitalist Service       Date of Admission:  6/24/2024    Assessment & Plan      Carley Vazquez is a 33 year old female with past medical history significant for severe alcohol use disorder with history of recent admission for complicated alcohol withdrawal, including alcohol withdrawal related seizures, who presented to [facility] on 6/24/2024 with acute alcohol intoxication and early alcohol withdrawal.    Severe Alcohol Use Disorder  Acute Alcohol Intoxication  Acute Alcohol Withdrawal  Hx Alcohol Withdrawal Related Seizures  Patient with a history of complicated alcohol withdrawal, including recent admission 5/29/2024-5/31/2024 requiring ICU level cares for withdrawal related seizure activity. Presenting on 6/24/2024 after continued alcohol use, including a significant amount of vodka + beer daily. She had an alcohol level of 0.14, but was already entering into early withdrawal. After discussion with emergency department physician, decision was made to provide Phenobarbital 260mg loading in attempt to avoid progression to complicated alcohol withdrawal. She was monitored in the emergency department for 1 hour after without significant respiratory depression, and subsequently admitted to [facility] for continued cares. If she continues to require significant amounts of benzodiazapienes or score very highly on CIWA, could consider additional 130mg of phenobarbital tonight. Strongly recommend patient meet with chemical dependency and consider alcohol cessation programs.  -CIWA protocol  -Trigger dose benzos  -Clonidine 0.1mg TID  -Gabapentin taper    Anion Gap Metabolic Acidosis 2/2 Lactic Acidosis  Bicarb 21 w/ anion gap 19. Lactic acid 2.7. Likely volume down in setting of severe alcohol use. Will provide IV fluids and recheck lactic acid in a few hours.    Transaminitis  ALT 62, AST 86. This is consistent with prior of ALT 62, AST 81 on  05/31/2024. Tbili 0.7. Encourage alcohol cessation.    Hypertension  Blood pressures 140-160s / 90-100s. Suspect related to acute alcohol withdrawal. Monitoring closely. Hydralazine PRN.          Diet:  Regular  DVT Prophylaxis: Enoxaparin (Lovenox) SQ  Toussaint Catheter: Not present  Lines: None     Cardiac Monitoring: None  Code Status:  Full    Clinically Significant Risk Factors Present on Admission             # Anion Gap Metabolic Acidosis: Highest Anion Gap = 19 mmol/L in last 2 days, will monitor and treat as appropriate                             Disposition Plan     Medically Ready for Discharge: Anticipated in 2-4 Days           Lenard Gates MD  Hospitalist Service  [facility]  Securely message with [employer] (more info)  Text page via [employer] Paging/Directory     ______________________________________________________________________    Chief Complaint   Alcohol use    History is obtained from the patient    History of Present Illness   Carley Vazquez is a 33 year old female with past medical history significant for severe alcohol use disorder with history of recent admission for complicated alcohol withdrawal, including alcohol withdrawal related seizures, who presented to [facility] on 6/24/2024 with acute alcohol intoxication and early alcohol withdrawal.    Patient was recently admitted to [facility] 5/29/2024 - 5/31/2024 for acute alcohol withdrawal complicated by seizures requiring ICU admission.  Since discharge, patient reports that she started drinking again.  This includes with significant metabolic + a case of beer daily.  She Alberto presents today at the insistence of her family due to her severe alcohol use and is wishing to detox.    In the emergency department, patient was afebrile and had normal oxygen saturations on room air. She was slightly tachycardic to 101 and hypertensive to 140-160s / 90-100s. Laboratory studies were significant for bicarb  "21, anion gap 19, ALT 62, AST 86. Remainder of the BMP and CBC were within normal limits. Ethyl alcohol level was 0.14. Patient was provided with phenobarbital loading dose 260mg x1 + valium 5mg IV x1 due to evidence of early alcohol withdrawal and recent history of withdrawal related seizures.      Past Medical History    Past Medical History:   Diagnosis Date    NO ACTIVE PROBLEMS        Past Surgical History   Past Surgical History:   Procedure Laterality Date    APPENDECTOMY OPEN      in 2nd grade    wisdom teeth  2009       Prior to Admission Medications   Prior to Admission Medications   Prescriptions Last Dose Informant Patient Reported? Taking?   albuterol (PROAIR HFA/PROVENTIL HFA/VENTOLIN HFA) 108 (90 Base) MCG/ACT inhaler More than a month  Yes Yes   Sig: Inhale 2 puffs into the lungs every 6 hours as needed for shortness of breath, wheezing or cough   levothyroxine (SYNTHROID/LEVOTHROID) 75 MCG tablet 6/24/2024 at AM  Yes Yes   Sig: Take 75 mcg by mouth daily   norethindrone (MICRONOR) 0.35 MG tablet 6/24/2024 at AM  Yes Yes   Sig: Take 0.35 mg by mouth daily   sertraline (ZOLOFT) 50 MG tablet 6/24/2024 at AM  No Yes   Sig: Take 2 tablets (100 mg) by mouth daily   traZODone (DESYREL) 50 MG tablet More than a month  Yes Yes   Sig: Take  mg by mouth nightly as needed      Facility-Administered Medications: None        Physical Exam   Temp: 98.3  F (36.8  C) Temp src: Temporal BP: (!) 146/96 Pulse: 98   Resp: 16 SpO2: 96 %     Height: 162.6 cm (5' 4\") Weight: 52.8 kg (116 lb 6.5 oz)  Estimated body mass index is 19.98 kg/m  as calculated from the following:    Height as of this encounter: 1.626 m (5' 4\").    Weight as of this encounter: 52.8 kg (116 lb 6.5 oz).    General: Very pleasant female resting comfortably in hospital bed.  Awake, alert, interactive.  HEENT: Normocephalic, atraumatic.  PERRL, EOMI.  Conjunctiva clear, sclerae anicteric.  Mucous membranes moist.  Abrasion on medial aspect of " right eyebrow.  Cardiac: Regular rate and rhythm without murmur, gallop, or rub.  No peripheral edema.  Respiratory: Normal work of breathing.  Clear to auscultation bilaterally without wheezing, rales, or rhonchi.  GI: Normal, active bowel sounds.  Abdomen soft, nontender, nondistended.  : Deferred.  Musculoskeletal: Moving all extremities appropriately.  Skin: No rashes or abrasions on exposed skin.  Neurologic: Alert and oriented x4.  Cranial nerves II through XII grossly intact.  Psychologic: Appropriate mood and affect.      Medical Decision Making       65 MINUTES SPENT BY ME on the date of service doing chart review, history, exam, documentation & further activities per the note.      Data     I have personally reviewed the following data over the past 24 hrs:    6.8  \   13.5   / 150     139 99 9.7 /  71   4.3 21 (L) 0.45 (L) \     ALT: 62 (H) AST: 86 (H) AP: 65 TBILI: 0.7   ALB: 4.6 TOT PROTEIN: 8.1 LIPASE: N/A       Imaging results reviewed over the past 24 hrs:   No results found for this or any previous visit (from the past 24 hour(s)).

## 2024-06-25 NOTE — PHARMACY-ADMISSION MEDICATION HISTORY
"Pharmacist Admission Medication History    Admission medication history is complete. The information provided in this note is only as accurate as the sources available at the time of the update.    Information Source(s): Patient and call to [facility] for dispense history (patient does not have insurance and pays cash)  via in-person    Pertinent Information: None    Changes made to PTA medication list:  Added: Norethindrone  Deleted: \"Unknown - Birth Control\"  Changed: None    Allergies reviewed with patient and updates made in EHR: yes    Medication History Completed By: [facility] 6/24/2024 7:00 PM    PTA Med List   Medication Sig Last Dose    albuterol (PROAIR HFA/PROVENTIL HFA/VENTOLIN HFA) 108 (90 Base) MCG/ACT inhaler Inhale 2 puffs into the lungs every 6 hours as needed for shortness of breath, wheezing or cough More than a month    levothyroxine (SYNTHROID/LEVOTHROID) 75 MCG tablet Take 75 mcg by mouth daily 6/24/2024 at AM    norethindrone (MICRONOR) 0.35 MG tablet Take 0.35 mg by mouth daily 6/24/2024 at AM    sertraline (ZOLOFT) 50 MG tablet Take 2 tablets (100 mg) by mouth daily 6/24/2024 at AM    traZODone (DESYREL) 50 MG tablet Take  mg by mouth nightly as needed More than a month     "

## 2024-06-25 NOTE — PLAN OF CARE
"Goal Outcome Evaluation:    VSS, elevated bp. RA. K+/mag/phos protocols. Prn tylenol given for pain. On CIWA protocol. Lactic 2.7, 1.3. LR bolus given. Receiving IVFs. Gabapentin taper. Seizure pads in place. Up SBA.       Plan of Care Reviewed With: patient    Overall Patient Progress: no change    Outcome Evaluation: CIWA 2, receiving IVFs, gabapentin taper.    Problem: Adult Inpatient Plan of Care  Goal: Plan of Care Review  Description: The Plan of Care Review/Shift note should be completed every shift.  The Outcome Evaluation is a brief statement about your assessment that the patient is improving, declining, or no change.  This information will be displayed automatically on your shift  note.  Outcome: Progressing  Flowsheets (Taken 6/24/2024 2316)  Outcome Evaluation: CIWA 2, receiving IVFs, gabapentin taper.  Plan of Care Reviewed With: patient  Overall Patient Progress: no change  Goal: Patient-Specific Goal (Individualized)  Description: You can add care plan individualizations to a care plan. Examples of Individualization might be:  \"Parent requests to be called daily at 9am for status\", \"I have a hard time hearing out of my right ear\", or \"Do not touch me to wake me up as it startles  me\".  Outcome: Progressing  Goal: Absence of Hospital-Acquired Illness or Injury  Outcome: Progressing  Intervention: Prevent Skin Injury  Recent Flowsheet Documentation  Taken 6/24/2024 2100 by Alyssa Marcos, RN  Body Position: position changed independently  Goal: Optimal Comfort and Wellbeing  Outcome: Progressing  Goal: Readiness for Transition of Care  Outcome: Progressing  Intervention: Mutually Develop Transition Plan  Recent Flowsheet Documentation  Taken 6/24/2024 2100 by Alyssa Marcos, RN  Equipment Currently Used at Home: none     Problem: Alcohol Withdrawal  Goal: Alcohol Withdrawal Symptom Control  Outcome: Progressing  Goal: Optimal Neurologic Function  Outcome: Progressing  Goal: Readiness for Change " Identified  Outcome: Progressing     Problem: Comorbidity Management  Goal: Maintenance of Seizure Control  Outcome: Progressing

## 2024-06-25 NOTE — PLAN OF CARE
"Pt with HTN. Given scheduled medication. No prn intervention needed. Pt lethargic. Awakens to gentle touch/shaking. Can be confused for a couple seconds after being awoken. Easy to redirect. Other wise oriented. LS clear. Abrasion to left face and left arm. LR infusing. Due to void by end of shift. Regular diet. SBA. RPIV.     Goal Outcome Evaluation:      Plan of Care Reviewed With: patient    Overall Patient Progress: no changeOverall Patient Progress: no change    Outcome Evaluation: Pt having increased lethargy. CIWA 2 and 3. Positive for tremors, anxiety, and sweats. Behavior WNL.      Problem: Adult Inpatient Plan of Care  Goal: Plan of Care Review  Description: The Plan of Care Review/Shift note should be completed every shift.  The Outcome Evaluation is a brief statement about your assessment that the patient is improving, declining, or no change.  This information will be displayed automatically on your shift  note.  Outcome: Progressing  Flowsheets (Taken 6/25/2024 0437)  Outcome Evaluation: Pt having increased lethargy. CIWA 2 and 3. Positive for tremors, anxiety, and sweats. Behavior WNL.  Plan of Care Reviewed With: patient  Overall Patient Progress: no change  Goal: Patient-Specific Goal (Individualized)  Description: You can add care plan individualizations to a care plan. Examples of Individualization might be:  \"Parent requests to be called daily at 9am for status\", \"I have a hard time hearing out of my right ear\", or \"Do not touch me to wake me up as it startles  me\".  Outcome: Progressing  Goal: Absence of Hospital-Acquired Illness or Injury  Outcome: Progressing  Intervention: Identify and Manage Fall Risk  Recent Flowsheet Documentation  Taken 6/25/2024 0030 by Britni Dai, RN  Safety Promotion/Fall Prevention:   activity supervised   clutter free environment maintained   nonskid shoes/slippers when out of bed   patient and family education   safety round/check " completed  Intervention: Prevent Skin Injury  Recent Flowsheet Documentation  Taken 6/25/2024 0030 by Britni Dai RN  Body Position: position changed independently  Goal: Optimal Comfort and Wellbeing  Outcome: Progressing  Goal: Readiness for Transition of Care  Outcome: Progressing     Problem: Alcohol Withdrawal  Goal: Alcohol Withdrawal Symptom Control  Outcome: Progressing  Goal: Optimal Neurologic Function  Outcome: Progressing  Goal: Readiness for Change Identified  Outcome: Progressing     Problem: Comorbidity Management  Goal: Maintenance of Seizure Control  Outcome: Progressing  Intervention: Maintain Seizure Symptom Control  Recent Flowsheet Documentation  Taken 6/25/2024 0030 by Britni Dai RN  Medication Review/Management: medications reviewed  Goal: Blood Pressure in Desired Range  Outcome: Progressing  Intervention: Maintain Blood Pressure Management  Recent Flowsheet Documentation  Taken 6/25/2024 0030 by Britni Dai RN  Medication Review/Management: medications reviewed     Problem: Skin Injury Risk Increased  Goal: Skin Health and Integrity  Outcome: Progressing  Intervention: Optimize Skin Protection  Recent Flowsheet Documentation  Taken 6/25/2024 0030 by Britni Dai RN  Activity Management: activity adjusted per tolerance     Problem: Fall Injury Risk  Goal: Absence of Fall and Fall-Related Injury  Outcome: Progressing  Intervention: Identify and Manage Contributors  Recent Flowsheet Documentation  Taken 6/25/2024 0030 by Britni Dai RN  Medication Review/Management: medications reviewed  Intervention: Promote Injury-Free Environment  Recent Flowsheet Documentation  Taken 6/25/2024 0030 by Britni Dai RN  Safety Promotion/Fall Prevention:   activity supervised   clutter free environment maintained   nonskid shoes/slippers when out of bed   patient and family education   safety round/check completed

## 2024-06-25 NOTE — PLAN OF CARE
"PRIMARY DIAGNOSIS: \"GENERIC\" NURSING  OUTPATIENT/OBSERVATION GOALS TO BE MET BEFORE DISCHARGE:  ADLs back to baseline: Yes    Activity and level of assistance: Up with standby assistance.    Pain status: Improved-controlled with oral pain medications.    Return to near baseline physical activity: Yes     Discharge Planner Nurse   Safe discharge environment identified: Yes  Barriers to discharge: Yes       Entered by: Marguerite Batista RN 06/25/2024 2:28 PM     Please review provider order for any additional goals.   Nurse to notify provider when observation goals have been met and patient is ready for discharge.    Goal Outcome Evaluation:      Plan of Care Reviewed With: patient    Overall Patient Progress: improvingOverall Patient Progress: improving    Outcome Evaluation: CIWA score of 2, pt reports feeling \"normal\"      Problem: Adult Inpatient Plan of Care  Goal: Plan of Care Review  Description: The Plan of Care Review/Shift note should be completed every shift.  The Outcome Evaluation is a brief statement about your assessment that the patient is improving, declining, or no change.  This information will be displayed automatically on your shift  note.  Outcome: Progressing  Flowsheets (Taken 6/25/2024 8904)  Outcome Evaluation: CIWA score of 2, pt reports feeling \"normal\"  Plan of Care Reviewed With: patient  Overall Patient Progress: improving  Goal: Patient-Specific Goal (Individualized)  Description: You can add care plan individualizations to a care plan. Examples of Individualization might be:  \"Parent requests to be called daily at 9am for status\", \"I have a hard time hearing out of my right ear\", or \"Do not touch me to wake me up as it startles  me\".  Outcome: Progressing  Goal: Absence of Hospital-Acquired Illness or Injury  Outcome: Progressing  Intervention: Identify and Manage Fall Risk  Recent Flowsheet Documentation  Taken 6/25/2024 0909 by Marguerite Batista RN  Safety Promotion/Fall Prevention: " safety round/check completed  Taken 6/25/2024 0840 by Marguerite Batista RN  Safety Promotion/Fall Prevention:   safety round/check completed   room organization consistent   patient and family education   nonskid shoes/slippers when out of bed   clutter free environment maintained   assistive device/personal items within reach   activity supervised  Intervention: Prevent Skin Injury  Recent Flowsheet Documentation  Taken 6/25/2024 0840 by Marguerite Batista RN  Body Position: position changed independently  Goal: Optimal Comfort and Wellbeing  Outcome: Progressing  Goal: Readiness for Transition of Care  Outcome: Progressing     Problem: Alcohol Withdrawal  Goal: Alcohol Withdrawal Symptom Control  Outcome: Progressing  Intervention: Minimize or Manage Alcohol Withdrawal Symptoms  Recent Flowsheet Documentation  Taken 6/25/2024 0840 by Marguerite Batista RN  Seizure Precautions:   activity supervised   clutter-free environment maintained   emergency equipment at bedside   side rails padded  Goal: Optimal Neurologic Function  Outcome: Progressing  Intervention: Prevent Seizure-Related Injury  Recent Flowsheet Documentation  Taken 6/25/2024 0840 by Marguerite Batista RN  Seizure Precautions:   activity supervised   clutter-free environment maintained   emergency equipment at bedside   side rails padded  Goal: Readiness for Change Identified  Outcome: Progressing     Problem: Comorbidity Management  Goal: Maintenance of Seizure Control  Outcome: Progressing  Intervention: Maintain Seizure Symptom Control  Recent Flowsheet Documentation  Taken 6/25/2024 0840 by Marguerite Batista RN  Seizure Precautions:   activity supervised   clutter-free environment maintained   emergency equipment at bedside   side rails padded  Medication Review/Management: medications reviewed  Goal: Blood Pressure in Desired Range  Outcome: Progressing  Intervention: Maintain Blood Pressure Management  Recent Flowsheet Documentation  Taken 6/25/2024  0840 by Marguerite Batista RN  Medication Review/Management: medications reviewed     Problem: Skin Injury Risk Increased  Goal: Skin Health and Integrity  Outcome: Progressing  Intervention: Optimize Skin Protection  Recent Flowsheet Documentation  Taken 6/25/2024 0840 by Marguerite Batista RN  Activity Management:   ambulated to bathroom   back to bed  Head of Bed (HOB) Positioning: HOB at 15 degrees     Problem: Fall Injury Risk  Goal: Absence of Fall and Fall-Related Injury  Outcome: Progressing  Intervention: Identify and Manage Contributors  Recent Flowsheet Documentation  Taken 6/25/2024 0840 by Marguerite Batista RN  Medication Review/Management: medications reviewed  Intervention: Promote Injury-Free Environment  Recent Flowsheet Documentation  Taken 6/25/2024 0953 by Marguerite Batista, RN  Safety Promotion/Fall Prevention: safety round/check completed  Taken 6/25/2024 0840 by Marguerite Batista RN  Safety Promotion/Fall Prevention:   safety round/check completed   room organization consistent   patient and family education   nonskid shoes/slippers when out of bed   clutter free environment maintained   assistive device/personal items within reach   activity supervised

## 2024-06-25 NOTE — PROVIDER NOTIFICATION
Provider notified [id number]: Patient's CIWA scores have been low. Can IMC orders be discontinued? Thanks!

## 2024-06-25 NOTE — UTILIZATION REVIEW
"  Admission Status; Secondary Review Determination         Under the authority of the Utilization Management Committee, the utilization review process indicated a secondary review on the above patient.  The review outcome is based on review of the medical records, discussions with staff, and applying clinical experience noted on the date of the review.          (x) Observation Status Appropriate - This patient does not meet hospital inpatient criteria and is placed in observation status. If this patient's primary payer is Medicare and was admitted as an inpatient, Condition Code 44 should be used and patient status changed to \"observation\".     RATIONALE FOR DETERMINATION   33-year-old female with severe alcohol use disorder and history of alcohol withdrawal seizures was admitted on 6/24/2024 for acute alcohol intoxication and early withdrawal. She received Phenobarbital 260 mg to prevent complicated withdrawal and was stable post-monitoring. Labs showed anion gap metabolic acidosis (bicarb 21, gap 19, lactic acid 2.7) likely from volume depletion. CIWA score below 5 indicated no severe withdrawal. IV fluids and CIWA protocol with benzodiazepines will manage symptoms.    This patient requires short hospitalization for alcohol withdrawal management and correction of metabolic acidosis, with no indication for prolonged stay once stabilized and connected to chemical dependency resources.    The severity of illness, intensity of service provided, expected LOS and risk for adverse outcome make the care appropriate for further observation; however, doesn't meet criteria for hospital inpatient admission. Dr Peterson  notified of this determination.    This document was produced using voice recognition software.      The information on this document is developed by the utilization review team in order for the business office to ensure compliance.  This only denotes the appropriateness of proper admission status and does not " reflect the quality of care rendered.         The definitions of Inpatient Status and Observation Status used in making the determination above are those provided in the CMS Coverage Manual, Chapter 1 and Chapter 6, section 70.4.      Sincerely,     YULIANA BECKER MD    System Medical Director  Utilization Management  [facility].

## 2024-06-26 VITALS
HEART RATE: 79 BPM | WEIGHT: 116.8 LBS | TEMPERATURE: 98.1 F | OXYGEN SATURATION: 100 % | RESPIRATION RATE: 16 BRPM | DIASTOLIC BLOOD PRESSURE: 78 MMHG | BODY MASS INDEX: 19.94 KG/M2 | SYSTOLIC BLOOD PRESSURE: 117 MMHG | HEIGHT: 64 IN

## 2024-06-26 LAB
MAGNESIUM SERPL-MCNC: 2 MG/DL (ref 1.7–2.3)
PHOSPHATE SERPL-MCNC: 5.1 MG/DL (ref 2.5–4.5)
POTASSIUM SERPL-SCNC: 3.9 MMOL/L (ref 3.4–5.3)

## 2024-06-26 PROCEDURE — 250N000013 HC RX MED GY IP 250 OP 250 PS 637: Performed by: EMERGENCY MEDICINE

## 2024-06-26 PROCEDURE — 258N000003 HC RX IP 258 OP 636: Performed by: STUDENT IN AN ORGANIZED HEALTH CARE EDUCATION/TRAINING PROGRAM

## 2024-06-26 PROCEDURE — 36415 COLL VENOUS BLD VENIPUNCTURE: CPT | Performed by: STUDENT IN AN ORGANIZED HEALTH CARE EDUCATION/TRAINING PROGRAM

## 2024-06-26 PROCEDURE — 250N000013 HC RX MED GY IP 250 OP 250 PS 637: Performed by: STUDENT IN AN ORGANIZED HEALTH CARE EDUCATION/TRAINING PROGRAM

## 2024-06-26 PROCEDURE — 84132 ASSAY OF SERUM POTASSIUM: CPT | Performed by: STUDENT IN AN ORGANIZED HEALTH CARE EDUCATION/TRAINING PROGRAM

## 2024-06-26 PROCEDURE — 99239 HOSP IP/OBS DSCHRG MGMT >30: CPT | Performed by: STUDENT IN AN ORGANIZED HEALTH CARE EDUCATION/TRAINING PROGRAM

## 2024-06-26 PROCEDURE — G0378 HOSPITAL OBSERVATION PER HR: HCPCS

## 2024-06-26 PROCEDURE — 83735 ASSAY OF MAGNESIUM: CPT | Performed by: STUDENT IN AN ORGANIZED HEALTH CARE EDUCATION/TRAINING PROGRAM

## 2024-06-26 PROCEDURE — 250N000013 HC RX MED GY IP 250 OP 250 PS 637: Performed by: INTERNAL MEDICINE

## 2024-06-26 PROCEDURE — 96361 HYDRATE IV INFUSION ADD-ON: CPT

## 2024-06-26 PROCEDURE — 84100 ASSAY OF PHOSPHORUS: CPT | Performed by: STUDENT IN AN ORGANIZED HEALTH CARE EDUCATION/TRAINING PROGRAM

## 2024-06-26 RX ORDER — MAGNESIUM OXIDE 400 MG/1
400 TABLET ORAL EVERY 4 HOURS
Status: COMPLETED | OUTPATIENT
Start: 2024-06-26 | End: 2024-06-26

## 2024-06-26 RX ORDER — GABAPENTIN 300 MG/1
300 CAPSULE ORAL EVERY EVENING
Qty: 10 CAPSULE | Refills: 0 | Status: SHIPPED | OUTPATIENT
Start: 2024-06-26 | End: 2024-08-14

## 2024-06-26 RX ADMIN — SODIUM CHLORIDE, POTASSIUM CHLORIDE, SODIUM LACTATE AND CALCIUM CHLORIDE: 600; 310; 30; 20 INJECTION, SOLUTION INTRAVENOUS at 00:11

## 2024-06-26 RX ADMIN — Medication 1 TABLET: at 08:29

## 2024-06-26 RX ADMIN — Medication 400 MG: at 08:29

## 2024-06-26 RX ADMIN — Medication 400 MG: at 12:00

## 2024-06-26 RX ADMIN — LEVOTHYROXINE SODIUM 75 MCG: 0.07 TABLET ORAL at 08:29

## 2024-06-26 RX ADMIN — SODIUM CHLORIDE, POTASSIUM CHLORIDE, SODIUM LACTATE AND CALCIUM CHLORIDE: 600; 310; 30; 20 INJECTION, SOLUTION INTRAVENOUS at 10:15

## 2024-06-26 RX ADMIN — ACETAMINOPHEN 650 MG: 325 TABLET, FILM COATED ORAL at 00:11

## 2024-06-26 RX ADMIN — GABAPENTIN 900 MG: 300 CAPSULE ORAL at 00:11

## 2024-06-26 RX ADMIN — CLONIDINE HYDROCHLORIDE 0.1 MG: 0.1 TABLET ORAL at 00:11

## 2024-06-26 RX ADMIN — THIAMINE HCL TAB 100 MG 100 MG: 100 TAB at 08:29

## 2024-06-26 RX ADMIN — FOLIC ACID 1 MG: 1 TABLET ORAL at 08:29

## 2024-06-26 RX ADMIN — GABAPENTIN 900 MG: 300 CAPSULE ORAL at 08:28

## 2024-06-26 RX ADMIN — CLONIDINE HYDROCHLORIDE 0.1 MG: 0.1 TABLET ORAL at 08:29

## 2024-06-26 ASSESSMENT — ACTIVITIES OF DAILY LIVING (ADL)
ADLS_ACUITY_SCORE: 23

## 2024-06-26 NOTE — PLAN OF CARE
"VSS. A/Ox4. LS clear, on RA. LR running at 100 mL/hr. CIWA scores of  2, 1, 0. Pt reports feeling \"normal\". Seizure precautions maintained. PRN tylneol given for tooth pain, with relief per pt. Possible discharge tomorrow.       Goal Outcome Evaluation:      Plan of Care Reviewed With: patient    Overall Patient Progress: improvingOverall Patient Progress: improving    Outcome Evaluation: Pt denies alcohol withdrawal symptoms      Problem: Adult Inpatient Plan of Care  Goal: Plan of Care Review  Description: The Plan of Care Review/Shift note should be completed every shift.  The Outcome Evaluation is a brief statement about your assessment that the patient is improving, declining, or no change.  This information will be displayed automatically on your shift  note.  6/25/2024 2012 by Marguerite Batista RN  Outcome: Progressing  Flowsheets (Taken 6/25/2024 2012)  Outcome Evaluation: Pt denies alcohol withdrawal symptoms  Plan of Care Reviewed With: patient  Overall Patient Progress: improving  6/25/2024 1712 by Marguerite Batista RN  Outcome: Progressing  Flowsheets (Taken 6/25/2024 1712)  Outcome Evaluation: CIWA score of 0  Plan of Care Reviewed With: patient  Overall Patient Progress: improving  6/25/2024 1427 by Marguerite Batista RN  Outcome: Progressing  Flowsheets (Taken 6/25/2024 1424)  Outcome Evaluation: CIWA score of 2, pt reports feeling \"normal\"  Plan of Care Reviewed With: patient  Overall Patient Progress: improving  Goal: Patient-Specific Goal (Individualized)  Description: You can add care plan individualizations to a care plan. Examples of Individualization might be:  \"Parent requests to be called daily at 9am for status\", \"I have a hard time hearing out of my right ear\", or \"Do not touch me to wake me up as it startles  me\".  6/25/2024 2012 by Marguerite Batista RN  Outcome: Progressing  6/25/2024 1712 by Marguerite Batista RN  Outcome: Progressing  6/25/2024 1427 by Marguerite Batista RN  Outcome: " Progressing  Goal: Absence of Hospital-Acquired Illness or Injury  6/25/2024 2012 by Marguerite Batista RN  Outcome: Progressing  6/25/2024 1712 by Marguerite Batista RN  Outcome: Progressing  6/25/2024 1427 by Marguerite Batista RN  Outcome: Progressing  Intervention: Identify and Manage Fall Risk  Recent Flowsheet Documentation  Taken 6/25/2024 1540 by Marguerite Batista RN  Safety Promotion/Fall Prevention: safety round/check completed  Taken 6/25/2024 1400 by Marguerite Batista RN  Safety Promotion/Fall Prevention: safety round/check completed  Taken 6/25/2024 0953 by Marguerite Batista RN  Safety Promotion/Fall Prevention: safety round/check completed  Taken 6/25/2024 0840 by Marguerite Batista RN  Safety Promotion/Fall Prevention:   safety round/check completed   room organization consistent   patient and family education   nonskid shoes/slippers when out of bed   clutter free environment maintained   assistive device/personal items within reach   activity supervised  Intervention: Prevent Skin Injury  Recent Flowsheet Documentation  Taken 6/25/2024 0840 by Marguerite Batista RN  Body Position: position changed independently  Goal: Optimal Comfort and Wellbeing  6/25/2024 2012 by Marguerite Batista RN  Outcome: Progressing  6/25/2024 1712 by Marguerite Batista RN  Outcome: Progressing  6/25/2024 1427 by Marguerite Batista RN  Outcome: Progressing  Goal: Readiness for Transition of Care  6/25/2024 2012 by Marguerite Batista RN  Outcome: Progressing  6/25/2024 1712 by Marguerite Batista RN  Outcome: Progressing  6/25/2024 1427 by Marguerite Batista RN  Outcome: Progressing     Problem: Alcohol Withdrawal  Goal: Alcohol Withdrawal Symptom Control  6/25/2024 2012 by Marguerite Batista RN  Outcome: Progressing  6/25/2024 1712 by Marguerite Batista RN  Outcome: Progressing  6/25/2024 1427 by Marguerite Batista, RN  Outcome: Progressing  Intervention: Minimize or Manage Alcohol Withdrawal Symptoms  Recent Flowsheet  Documentation  Taken 6/25/2024 0840 by Marguerite Batista RN  Seizure Precautions:   activity supervised   clutter-free environment maintained   emergency equipment at bedside   side rails padded  Goal: Optimal Neurologic Function  6/25/2024 2012 by Marguerite Batista RN  Outcome: Progressing  6/25/2024 1712 by Marguerite Batista RN  Outcome: Progressing  6/25/2024 1427 by Marguerite Batista RN  Outcome: Progressing  Intervention: Prevent Seizure-Related Injury  Recent Flowsheet Documentation  Taken 6/25/2024 0840 by Marguerite Batista RN  Seizure Precautions:   activity supervised   clutter-free environment maintained   emergency equipment at bedside   side rails padded  Goal: Readiness for Change Identified  6/25/2024 2012 by Marguerite Batista RN  Outcome: Progressing  6/25/2024 1712 by Marguerite Batista RN  Outcome: Progressing  6/25/2024 1427 by Marguerite Batista RN  Outcome: Progressing     Problem: Comorbidity Management  Goal: Maintenance of Seizure Control  6/25/2024 2012 by Marguerite Batista RN  Outcome: Progressing  6/25/2024 1712 by Marguerite Batista RN  Outcome: Progressing  6/25/2024 1427 by Marguerite Batista RN  Outcome: Progressing  Intervention: Maintain Seizure Symptom Control  Recent Flowsheet Documentation  Taken 6/25/2024 0840 by Marguertie Batista RN  Seizure Precautions:   activity supervised   clutter-free environment maintained   emergency equipment at bedside   side rails padded  Medication Review/Management: medications reviewed  Goal: Blood Pressure in Desired Range  6/25/2024 2012 by Marguerite Batista RN  Outcome: Progressing  6/25/2024 1712 by Marguerite Batista RN  Outcome: Progressing  6/25/2024 1427 by Marguerite Batista RN  Outcome: Progressing  Intervention: Maintain Blood Pressure Management  Recent Flowsheet Documentation  Taken 6/25/2024 0840 by Marguerite Batista RN  Medication Review/Management: medications reviewed     Problem: Skin Injury Risk Increased  Goal: Skin Health and  Integrity  6/25/2024 2012 by Marguerite Batista RN  Outcome: Progressing  6/25/2024 1712 by Marguerite Batista RN  Outcome: Progressing  6/25/2024 1427 by Marguerite Batista RN  Outcome: Progressing  Intervention: Optimize Skin Protection  Recent Flowsheet Documentation  Taken 6/25/2024 0840 by Marguerite Batista RN  Activity Management:   ambulated to bathroom   back to bed  Head of Bed (HOB) Positioning: HOB at 15 degrees     Problem: Fall Injury Risk  Goal: Absence of Fall and Fall-Related Injury  6/25/2024 2012 by Marguerite Batista RN  Outcome: Progressing  6/25/2024 1712 by Marguerite Batista RN  Outcome: Progressing  6/25/2024 1427 by Marguerite Batista RN  Outcome: Progressing  Intervention: Identify and Manage Contributors  Recent Flowsheet Documentation  Taken 6/25/2024 0840 by Marguerite Batista RN  Medication Review/Management: medications reviewed  Intervention: Promote Injury-Free Environment  Recent Flowsheet Documentation  Taken 6/25/2024 1540 by Marguerite Batista RN  Safety Promotion/Fall Prevention: safety round/check completed  Taken 6/25/2024 1400 by Marguerite Batista RN  Safety Promotion/Fall Prevention: safety round/check completed  Taken 6/25/2024 0953 by Marguerite Batista RN  Safety Promotion/Fall Prevention: safety round/check completed  Taken 6/25/2024 0840 by Marguerite Batista RN  Safety Promotion/Fall Prevention:   safety round/check completed   room organization consistent   patient and family education   nonskid shoes/slippers when out of bed   clutter free environment maintained   assistive device/personal items within reach   activity supervised

## 2024-06-26 NOTE — PROGRESS NOTES
OBSERVATION patient END time:  12:49 PM      Pt is in stable condition, vss, no co pain/cp/sob.  Pt dc home today.  All dc education reviewed with pt in regards to: diet, activity, safety, s/s to report, medications and rx, follow up appointments/care, etc.  Questions were answered and pt verbalized understanding.  All belongings were sent with pt, ambulated with staff to main entrance per pt preference, private transport.

## 2024-06-26 NOTE — DISCHARGE SUMMARY
[facility]  Hospitalist Discharge Summary      Date of Admission:  6/24/2024  Date of Discharge:  6/26/2024  Discharging Provider: Susan Peterson MD  Discharge Service: Hospitalist Service    Discharge Diagnoses   Alcohol use disorder  Acute alcohol intoxication  Acute alcohol withdrawal  History of alcohol withdrawal related seizures    Clinically Significant Risk Factors          Follow-ups Needed After Discharge   Follow-up Appointments     Follow-up and recommended labs and tests       Follow up with primary care provider, Physician No Ref-Primary, within 7   days for hospital follow- up.  No follow up labs or test are needed.    Please continue working on finding rehab for you.            Unresulted Labs Ordered in the Past 30 Days of this Admission       Date and Time Order Name Status Description    6/24/2024  8:31 PM Blood Culture Arm, Left Preliminary     6/24/2024  8:31 PM Blood Culture Hand, Right Preliminary             Discharge Disposition   Discharged to home  Condition at discharge: Stable    Hospital Course   Carley Vazquez is a 33 year old female with past medical history significant for severe alcohol use disorder with history of recent admission for complicated alcohol withdrawal, including alcohol withdrawal related seizures, who presented to [facility] on 6/24/2024 with acute alcohol intoxication and early alcohol withdrawal.     Severe Alcohol Use Disorder  Acute Alcohol Intoxication  Acute Alcohol Withdrawal  Hx Alcohol Withdrawal Related Seizures  Patient with a history of complicated alcohol withdrawal, including recent admission 5/29/2024-5/31/2024 requiring ICU level cares for withdrawal related seizure activity. Presenting on 6/24/2024 after continued alcohol use, including a significant amount of vodka + beer daily. She had an alcohol level of 0.14, but was already entering into early withdrawal. After discussion with emergency department physician,  decision was made to provide Phenobarbital 260mg loading in attempt to avoid progression to complicated alcohol withdrawal. She was monitored in the emergency department for 1 hour after without significant respiratory depression, and subsequently [place] [facility] [place].  Upon admission she was  started on gabapentin taper, clonidine, CIWA protocol along with as needed Ativan.  Patient improved, her CIWA scoring 0-2 in the past 24 hours and she did not require any additional benzodiazepine.  She was eating and drinking just fine ambulating well and feels back to baseline.  Discussed chemical dependency consultation with the patient.  She is not interested to meet with CD in the hospital.  She said that her family is working to set her up with a rehab.  She does not know which rehab but thinks that it is in [place].  She is interested to start gabapentin to help her with reducing alcohol cravings.    -Start gabapentin 300 mg daily upon discharge to help with alcohol cravings.  Patient may need up titration of this medication.  She does not have a primary care but is planning to establish care.  She was strongly advised to stop drinking alcohol and seek help.   -Trazodone listed in her home medication list.  Patient states that she seldom takes this medication as she does not like how she feels after taking it.  Last time she took the dose of trazodone about 2 months ago.  Discussed to use it cautiously while she is taking gabapentin.       Anion Gap Metabolic Acidosis 2/2 Lactic Acidosis-resolved  Bicarb 21 w/ anion gap 19. Lactic acid 2.7. Likely volume down in setting of severe alcohol use.  Improved with IV fluids     Transaminitis---improving  ALT 62, AST 86. This is consistent with prior of ALT 62, AST 81 on 05/31/2024. Tbili 0.7. Encourage alcohol cessation.     Hypertension  Blood pressures 140-160s / 90-100s. Suspect related to acute alcohol withdrawal. Monitoring closely. Hydralazine  PRN.  Establish care with primary for further follow-up.        Leukocytopenia  Chronic thrombocytopenia   -Continue to monitor CBC    Consultations This Hospital Stay   None    Code Status   Full Code    Time Spent on this Encounter   I, Susan Peterson MD, personally saw the patient today and spent greater than 30 minutes discharging this patient.       Susan Peterson MD  [place] [address] [facility]  [address]  [facility] [address]  Phone: [phone]  Fax: [phone]  ______________________________________________________________________    Physical Exam   Vital Signs: Temp: 98.1  F (36.7  C) Temp src: Oral BP: 117/78 Pulse: 79   Resp: 16 SpO2: 100 % O2 Device: None (Room air)    Weight: 116 lbs 12.8 oz    Constitutional: awake, alert, cooperative, sitting in bed, no apparent distress, and appears stated age  Eyes: Anicteric sclera, linear abrasion over the left eye  ENT: normocephalic, without obvious abnormality  Respiratory: No increased work of breathing, good air exchange, clear to auscultation bilaterally, no crackles or wheezing  Cardiovascular: Normal rate, regular rhythm, no murmur  GI: Soft, nontender, nondistended  Musculoskeletal: no lower extremity pitting edema present  Neurologic: Awake, alert, oriented to name, place and time.  Moving all 4 extremities  Neuropsychiatric: Appropriate mood and affect       Primary Care Physician   Physician No Ref-Primary    Discharge Orders      Reason for your hospital stay    Alcohol withdrawal     Follow-up and recommended labs and tests     Follow up with primary care provider, Physician No Ref-Primary, within 7 days for hospital follow- up.  No follow up labs or test are needed.    Please continue working on finding rehab for you.     Activity    Your activity upon discharge: activity as tolerated     Diet    Follow this diet upon discharge: Orders Placed This Encounter      Combination Diet Regular Diet Adult       Significant Results  and Procedures   Most Recent 3 CBC's:  Recent Labs   Lab Test 06/25/24  0626 06/24/24  1645 05/31/24  0706   WBC 3.0* 6.8 4.6   HGB 13.1 13.5 14.0   MCV 97 95 95   * 150 121*     Most Recent 3 BMP's:  Recent Labs   Lab Test 06/26/24  0633 06/25/24  0626 06/25/24  0304 06/24/24  1645 05/31/24  0706   NA  --  136  --  139 137   POTASSIUM 3.9 3.5  --  4.3 3.7   CHLORIDE  --  98  --  99 102   CO2  --  24  --  21* 22   BUN  --  9.8  --  9.7 4.4*   CR  --  0.44*  --  0.45*  0.45* 0.40*   ANIONGAP  --  14  --  19* 13   MAHSA  --  8.7  --  9.1 8.2*   GLC  --  76 85 71 95       Discharge Medications   Current Discharge Medication List        START taking these medications    Details   gabapentin (NEURONTIN) 300 MG capsule Take 1 capsule (300 mg) by mouth every evening for 10 days  Qty: 10 capsule, Refills: 0    Associated Diagnoses: Alcohol use disorder           CONTINUE these medications which have NOT CHANGED    Details   albuterol (PROAIR HFA/PROVENTIL HFA/VENTOLIN HFA) 108 (90 Base) MCG/ACT inhaler Inhale 2 puffs into the lungs every 6 hours as needed for shortness of breath, wheezing or cough      levothyroxine (SYNTHROID/LEVOTHROID) 75 MCG tablet Take 75 mcg by mouth daily      norethindrone (MICRONOR) 0.35 MG tablet Take 0.35 mg by mouth daily      sertraline (ZOLOFT) 50 MG tablet Take 2 tablets (100 mg) by mouth daily  Qty: 30 tablet, Refills: 6    Associated Diagnoses: Alcohol withdrawal seizure without complication (H)      traZODone (DESYREL) 50 MG tablet Take  mg by mouth nightly as needed           Allergies   Allergies   Allergen Reactions    Bromine      rash    Nickel      rash

## 2024-06-26 NOTE — DISCHARGE INSTRUCTIONS
Learning About Alcohol Withdrawal  What is alcohol withdrawal?     If you drink alcohol regularly and then cut down on how much you drink or suddenly stop drinking, you may go through some physical and emotional problems. This is called withdrawal. It happens because the alcohol is clearing out of your system. Clearing the alcohol from your body is called detoxification, or detox.  Most people may be able to cut down or stop drinking with only mild withdrawal. But people who drink large amounts of alcohol should not try to detox at home unless they work closely with a doctor to manage it. A person can die of severe alcohol withdrawal.  Before you stop drinking, talk to your doctor. It's important to tell your doctor exactly how much you have been drinking. Your doctor can help you decide if you need to detox in a medical center where you can be supervised.  What are the symptoms?  Symptoms of alcohol withdrawal may start a few hours after you stop drinking. Or they may not start until a few days after the last drink.  Mild symptoms include:  Nausea.  Sweating.  Shakiness.  Diarrhea.  Intense worry.  Disturbed sleep.  Headache.  More severe symptoms include:  Vomiting or belly pain.  Being confused, upset, and cranky.  Changed sensations. You might feel things on your body that aren't really there. Or you may see or hear things that aren't there.  Trembling.  Being short of breath or having pain in your chest.  Having seizures.  Symptoms may peak within a few days. Mild symptoms can last for a few weeks. If your symptoms are severe, you'll need to see a doctor.  How is alcohol withdrawal treated?  You may get medicine to treat withdrawal symptoms, whether you detox at home or in a medical center. Medicine that treats seizures can also help. You may start with a high dose and then take smaller amounts over several days. There's also medicine that can help you avoid alcohol while you recover.  After detox, the focus  "shifts to staying alcohol-free. You can learn skills that help you stay sober as you recover. You also may get medicine to help you stay sober. Most people get some type of therapy, such as group counseling. Treatment doesn't focus on alcohol use alone. It may address other parts of your life, like your relationships, work, medical problems, and home life.  Finding new ways to deal with life's challenges without drinking takes time and effort. Treatment, support, patience, and commitment will help you make the changes you need to live a full life without alcohol.  How can you care for yourself at home?  Make sure there's no alcohol in your home. This includes drinks as well as liquid medicines, rubbing alcohol, and some flavorings like vanilla extract.  Try not to hang out with people you used to drink with.  Don't go it alone. Spend time with people who support the changes you are making in your life. This includes asking for advice and help from people who have stopped drinking. You might also try mutual support groups such as Alcoholics Anonymous. And you can look for a counselor who has experience helping people with alcohol use disorder.  Rest, drink lots of fluids, and eat healthy foods.  If you crave alcohol, eat snacks such as fruit, cheese and crackers, and pretzels. High-carbohydrate foods may help reduce the craving for alcohol.  Where can you learn more?  Go to https://www.[employer].net/patiented  Enter A011 in the search box to learn more about \"Learning About Alcohol Withdrawal.\"  Current as of: November 15, 2023               Content Version: 14.0    5099-8302 [employer].   Care instructions adapted under license by your healthcare professional. If you have questions about a medical condition or this instruction, always ask your healthcare professional. [employer] disclaims any warranty or liability for your use of this information.    Learning About Alcohol Use " Disorder  What is alcohol use disorder?  Alcohol use disorder means that a person drinks alcohol even though it causes harm to themselves or others. It can range from mild to severe. The more symptoms of this disorder you have, the more severe it may be. People who have it may find it hard to control their use of alcohol.  People who have this disorder may argue with others about how much they're drinking. Their job may be affected because of drinking. They may drink when it's dangerous or illegal, such as when they drive. They also may have a strong need, or craving, to drink. They may feel like they must drink just to get by. Their drinking may increase their risk of getting hurt or being in a car crash.  Over time, drinking too much alcohol may cause health problems. These may include high blood pressure, liver problems, or problems with digestion.  What are the symptoms?  Maybe you've wondered about your alcohol habits or how to tell if your drinking is becoming a problem.  Here are some of the symptoms of alcohol use disorder. You may have it if you have two or more of the following symptoms:  You drink larger amounts of alcohol than you ever meant to. Or you've been drinking for a longer time than you ever meant to.  You can't cut down or control your use. Or you constantly wish you could cut down.  You spend a lot of time getting or drinking alcohol or recovering from its effects.  You have strong cravings for alcohol.  You can no longer do your main jobs at work, at school, or at home.  You keep drinking alcohol, even though your use hurts your relationships.  You have stopped doing important activities because of your alcohol use.  You drink alcohol in situations where doing so is dangerous.  You keep drinking alcohol even though you know it's causing health problems.  You need more and more alcohol to get the same effect, or you get less effect from the same amount over time. This is called tolerance.  You  "have uncomfortable symptoms when you stop drinking alcohol or use less. This is called withdrawal.  Alcohol use disorder can range from mild to severe. The more symptoms you have, the more severe the disorder may be.  You might not realize that your drinking is a problem. You might not drink large amounts when you drink. Or you might go for days or weeks between drinking episodes. But even if you don't drink very often, your drinking could still be harmful and put you at risk.  How is alcohol use disorder treated?  Getting help is up to you. But you don't have to do it alone. There are many people and kinds of treatments that can help.  Treatment for alcohol use disorder can include:  Group therapy, one or more types of counseling, and alcohol education.  Medicines that help to:  Reduce withdrawal symptoms and help you safely stop drinking.  Reduce cravings for alcohol.  Support groups. These groups include Alcoholics Anonymous and [employer] (Self-Management and Recovery Training).  Some people are able to stop or cut back on drinking with help from a counselor. People who have moderate to severe alcohol use disorder may need medical treatment. They may need to stay in a hospital or treatment center.  You may have a treatment team to help you. This team may include a psychologist or psychiatrist, counselors, doctors, social workers, nurses, and a . A  helps plan and manage your treatment.  Follow-up care is a key part of your treatment and safety. Be sure to make and go to all appointments, and call your doctor if you are having problems. It's also a good idea to know your test results and keep a list of the medicines you take.  Where can you learn more?  Go to https://www.[employer].net/patiented  Enter H758 in the search box to learn more about \"Learning About Alcohol Use Disorder.\"  Current as of: November 15, 2023               Content Version: 14.0    4216-7856 [employer] " [employer].   Care instructions adapted under license by your healthcare professional. If you have questions about a medical condition or this instruction, always ask your healthcare professional. [employer] disclaims any warranty or liability for your use of this information.

## 2024-06-26 NOTE — PLAN OF CARE
"  Problem: Adult Inpatient Plan of Care  Goal: Plan of Care Review  Description: The Plan of Care Review/Shift note should be completed every shift.  The Outcome Evaluation is a brief statement about your assessment that the patient is improving, declining, or no change.  This information will be displayed automatically on your shift  note.  Outcome: Progressing  Flowsheets (Taken 6/26/2024 0610)  Outcome Evaluation: CIWA 0 all night.  Plan of Care Reviewed With: patient  Overall Patient Progress: improving  Goal: Patient-Specific Goal (Individualized)  Description: You can add care plan individualizations to a care plan. Examples of Individualization might be:  \"Parent requests to be called daily at 9am for status\", \"I have a hard time hearing out of my right ear\", or \"Do not touch me to wake me up as it startles  me\".  Outcome: Progressing  Goal: Absence of Hospital-Acquired Illness or Injury  Outcome: Progressing  Intervention: Identify and Manage Fall Risk  Recent Flowsheet Documentation  Taken 6/25/2024 1946 by Marycarmen Jules RN  Safety Promotion/Fall Prevention: safety round/check completed  Intervention: Prevent Skin Injury  Recent Flowsheet Documentation  Taken 6/25/2024 1946 by Marycarmen Jules RN  Body Position: position changed independently  Goal: Optimal Comfort and Wellbeing  Outcome: Progressing  Goal: Readiness for Transition of Care  Outcome: Progressing     Problem: Alcohol Withdrawal  Goal: Alcohol Withdrawal Symptom Control  Outcome: Progressing  Goal: Optimal Neurologic Function  Outcome: Progressing  Goal: Readiness for Change Identified  Outcome: Progressing     Problem: Comorbidity Management  Goal: Maintenance of Seizure Control  Outcome: Progressing  Intervention: Maintain Seizure Symptom Control  Recent Flowsheet Documentation  Taken 6/25/2024 1946 by Marycarmen Jules RN  Medication Review/Management: medications reviewed  Goal: Blood Pressure in Desired Range  Outcome: Progressing  Intervention: " Maintain Blood Pressure Management  Recent Flowsheet Documentation  Taken 6/25/2024 1946 by Marycarmen Jules RN  Medication Review/Management: medications reviewed     Problem: Skin Injury Risk Increased  Goal: Skin Health and Integrity  Outcome: Progressing  Intervention: Plan: Nurse Driven Intervention: Moisture Management  Recent Flowsheet Documentation  Taken 6/25/2024 1954 by Marycarmen Jules RN  Moisture Interventions: No brief in bed  Bathing/Skin Care: (Pt indepedent for self hygiene)   patient refused   other (see comments)  Intervention: Optimize Skin Protection  Recent Flowsheet Documentation  Taken 6/25/2024 1946 by Marycarmen Jules RN  Activity Management: ambulated to bathroom  Head of Bed (HOB) Positioning: HOB at 60-90 degrees     Problem: Fall Injury Risk  Goal: Absence of Fall and Fall-Related Injury  Outcome: Progressing  Intervention: Identify and Manage Contributors  Recent Flowsheet Documentation  Taken 6/25/2024 1946 by Marycarmen Jules RN  Medication Review/Management: medications reviewed  Intervention: Promote Injury-Free Environment  Recent Flowsheet Documentation  Taken 6/25/2024 1946 by Marycarmen Jules RN  Safety Promotion/Fall Prevention: safety round/check completed   Goal Outcome Evaluation:      Plan of Care Reviewed With: patient    Overall Patient Progress: improvingOverall Patient Progress: improving    Outcome Evaluation: CIWA 0 all night.

## 2024-06-28 ENCOUNTER — PATIENT OUTREACH (OUTPATIENT)
Dept: CARE COORDINATION | Facility: CLINIC | Age: 34
End: 2024-06-28
Payer: MEDICAID

## 2024-06-28 NOTE — PROGRESS NOTES
Clinic Care Coordination Contact  [facility]/Voicemail    Clinical Data: Care Coordinator Outreach    Outreach Documentation Number of Outreach Attempt   6/28/2024   9:56 AM 2       Left message on patient's voicemail with call back information and requested return call.    Care Coordinator will do no further outreaches at this time.    Carolynn York  [phone]  Care

## 2024-06-30 LAB
BACTERIA BLD CULT: NO GROWTH
BACTERIA BLD CULT: NO GROWTH

## 2024-07-07 ENCOUNTER — HEALTH MAINTENANCE LETTER (OUTPATIENT)
Age: 34
End: 2024-07-07

## 2024-08-14 ENCOUNTER — HOSPITAL ENCOUNTER (OUTPATIENT)
Facility: CLINIC | Age: 34
Setting detail: OBSERVATION
Discharge: HOME | End: 2024-08-15
Attending: EMERGENCY MEDICINE | Admitting: INTERNAL MEDICINE
Payer: COMMERCIAL

## 2024-08-14 DIAGNOSIS — E83.39 HYPOPHOSPHATEMIA: ICD-10-CM

## 2024-08-14 DIAGNOSIS — R11.2 NAUSEA AND VOMITING, UNSPECIFIED VOMITING TYPE: ICD-10-CM

## 2024-08-14 DIAGNOSIS — E87.1 HYPONATREMIA: ICD-10-CM

## 2024-08-14 DIAGNOSIS — F10.239 ALCOHOL DEPENDENCE WITH WITHDRAWAL WITH COMPLICATION (H): Primary | ICD-10-CM

## 2024-08-14 DIAGNOSIS — E83.42 HYPOMAGNESEMIA: ICD-10-CM

## 2024-08-14 DIAGNOSIS — E87.20 LACTIC ACIDOSIS: ICD-10-CM

## 2024-08-14 DIAGNOSIS — F10.939 ALCOHOL WITHDRAWAL, WITH UNSPECIFIED COMPLICATION (H): ICD-10-CM

## 2024-08-14 LAB
ALBUMIN SERPL BCG-MCNC: 4.3 G/DL (ref 3.5–5.2)
ALP SERPL-CCNC: 76 U/L (ref 40–150)
ALT SERPL W P-5'-P-CCNC: 108 U/L (ref 0–50)
AMMONIA PLAS-SCNC: 27 UMOL/L (ref 11–51)
ANION GAP SERPL CALCULATED.3IONS-SCNC: 27 MMOL/L (ref 7–15)
AST SERPL W P-5'-P-CCNC: 367 U/L (ref 0–45)
ATRIAL RATE - MUSE: 123 BPM
BASOPHILS # BLD AUTO: 0 10E3/UL (ref 0–0.2)
BASOPHILS NFR BLD AUTO: 0 %
BILIRUB SERPL-MCNC: 1.4 MG/DL
BUN SERPL-MCNC: 14.2 MG/DL (ref 6–20)
CALCIUM SERPL-MCNC: 8 MG/DL (ref 8.8–10.4)
CHLORIDE SERPL-SCNC: 82 MMOL/L (ref 98–107)
CREAT SERPL-MCNC: 0.53 MG/DL (ref 0.51–0.95)
DIASTOLIC BLOOD PRESSURE - MUSE: NORMAL MMHG
EGFRCR SERPLBLD CKD-EPI 2021: >90 ML/MIN/1.73M2
EOSINOPHIL # BLD AUTO: 0 10E3/UL (ref 0–0.7)
EOSINOPHIL NFR BLD AUTO: 0 %
ERYTHROCYTE [DISTWIDTH] IN BLOOD BY AUTOMATED COUNT: 11.9 % (ref 10–15)
ETHANOL SERPL-MCNC: <0.01 G/DL
GLUCOSE SERPL-MCNC: 127 MG/DL (ref 70–99)
HCG SERPL QL: NEGATIVE
HCO3 BLDV-SCNC: 20 MMOL/L (ref 21–28)
HCO3 SERPL-SCNC: 17 MMOL/L (ref 22–29)
HCT VFR BLD AUTO: 36.5 % (ref 35–47)
HGB BLD-MCNC: 13 G/DL (ref 11.7–15.7)
HOLD SPECIMEN: NORMAL
IMM GRANULOCYTES # BLD: 0 10E3/UL
IMM GRANULOCYTES NFR BLD: 0 %
INR PPP: 0.95 (ref 0.85–1.15)
INTERPRETATION ECG - MUSE: NORMAL
LACTATE BLD-SCNC: 5.7 MMOL/L
LACTATE SERPL-SCNC: 1.3 MMOL/L (ref 0.7–2)
LIPASE SERPL-CCNC: 81 U/L (ref 13–60)
LYMPHOCYTES # BLD AUTO: 0.6 10E3/UL (ref 0.8–5.3)
LYMPHOCYTES NFR BLD AUTO: 11 %
MAGNESIUM SERPL-MCNC: 1.6 MG/DL (ref 1.7–2.3)
MCH RBC QN AUTO: 32.1 PG (ref 26.5–33)
MCHC RBC AUTO-ENTMCNC: 35.6 G/DL (ref 31.5–36.5)
MCV RBC AUTO: 90 FL (ref 78–100)
MONOCYTES # BLD AUTO: 0.7 10E3/UL (ref 0–1.3)
MONOCYTES NFR BLD AUTO: 14 %
NEUTROPHILS # BLD AUTO: 3.7 10E3/UL (ref 1.6–8.3)
NEUTROPHILS NFR BLD AUTO: 74 %
NRBC # BLD AUTO: 0 10E3/UL
NRBC BLD AUTO-RTO: 0 /100
P AXIS - MUSE: 76 DEGREES
PCO2 BLDV: 27 MM HG (ref 40–50)
PH BLDV: 7.48 [PH] (ref 7.32–7.43)
PHOSPHATE SERPL-MCNC: 1.4 MG/DL (ref 2.5–4.5)
PHOSPHATE SERPL-MCNC: 1.8 MG/DL (ref 2.5–4.5)
PLATELET # BLD AUTO: 125 10E3/UL (ref 150–450)
PO2 BLDV: 34 MM HG (ref 25–47)
POTASSIUM SERPL-SCNC: 4.1 MMOL/L (ref 3.4–5.3)
PR INTERVAL - MUSE: 90 MS
PROT SERPL-MCNC: 7.1 G/DL (ref 6.4–8.3)
QRS DURATION - MUSE: 96 MS
QT - MUSE: 352 MS
QTC - MUSE: 503 MS
R AXIS - MUSE: 77 DEGREES
RBC # BLD AUTO: 4.05 10E6/UL (ref 3.8–5.2)
SAO2 % BLDV: 72 % (ref 70–75)
SODIUM SERPL-SCNC: 126 MMOL/L (ref 135–145)
SYSTOLIC BLOOD PRESSURE - MUSE: NORMAL MMHG
T AXIS - MUSE: -29 DEGREES
VENTRICULAR RATE- MUSE: 123 BPM
WBC # BLD AUTO: 5 10E3/UL (ref 4–11)

## 2024-08-14 PROCEDURE — 84703 CHORIONIC GONADOTROPIN ASSAY: CPT | Performed by: EMERGENCY MEDICINE

## 2024-08-14 PROCEDURE — 82077 ASSAY SPEC XCP UR&BREATH IA: CPT | Performed by: EMERGENCY MEDICINE

## 2024-08-14 PROCEDURE — 250N000013 HC RX MED GY IP 250 OP 250 PS 637: Performed by: PHYSICIAN ASSISTANT

## 2024-08-14 PROCEDURE — 84100 ASSAY OF PHOSPHORUS: CPT | Performed by: PHYSICIAN ASSISTANT

## 2024-08-14 PROCEDURE — 120N000001 HC R&B MED SURG/OB

## 2024-08-14 PROCEDURE — 258N000003 HC RX IP 258 OP 636: Performed by: PHYSICIAN ASSISTANT

## 2024-08-14 PROCEDURE — 83605 ASSAY OF LACTIC ACID: CPT

## 2024-08-14 PROCEDURE — 85025 COMPLETE CBC W/AUTO DIFF WBC: CPT | Performed by: EMERGENCY MEDICINE

## 2024-08-14 PROCEDURE — 83690 ASSAY OF LIPASE: CPT | Performed by: EMERGENCY MEDICINE

## 2024-08-14 PROCEDURE — 93005 ELECTROCARDIOGRAM TRACING: CPT

## 2024-08-14 PROCEDURE — 36415 COLL VENOUS BLD VENIPUNCTURE: CPT | Performed by: EMERGENCY MEDICINE

## 2024-08-14 PROCEDURE — 96366 THER/PROPH/DIAG IV INF ADDON: CPT

## 2024-08-14 PROCEDURE — 82803 BLOOD GASES ANY COMBINATION: CPT

## 2024-08-14 PROCEDURE — 82140 ASSAY OF AMMONIA: CPT | Performed by: EMERGENCY MEDICINE

## 2024-08-14 PROCEDURE — 80053 COMPREHEN METABOLIC PANEL: CPT | Performed by: EMERGENCY MEDICINE

## 2024-08-14 PROCEDURE — 250N000013 HC RX MED GY IP 250 OP 250 PS 637: Performed by: EMERGENCY MEDICINE

## 2024-08-14 PROCEDURE — 96368 THER/DIAG CONCURRENT INF: CPT

## 2024-08-14 PROCEDURE — 99222 1ST HOSP IP/OBS MODERATE 55: CPT | Performed by: PHYSICIAN ASSISTANT

## 2024-08-14 PROCEDURE — 250N000011 HC RX IP 250 OP 636: Performed by: EMERGENCY MEDICINE

## 2024-08-14 PROCEDURE — 250N000009 HC RX 250: Performed by: EMERGENCY MEDICINE

## 2024-08-14 PROCEDURE — 96376 TX/PRO/DX INJ SAME DRUG ADON: CPT

## 2024-08-14 PROCEDURE — 258N000003 HC RX IP 258 OP 636: Performed by: EMERGENCY MEDICINE

## 2024-08-14 PROCEDURE — 99285 EMERGENCY DEPT VISIT HI MDM: CPT | Mod: 25

## 2024-08-14 PROCEDURE — 99207 PR APP CREDIT; MD BILLING SHARED VISIT: CPT | Performed by: INTERNAL MEDICINE

## 2024-08-14 PROCEDURE — 36415 COLL VENOUS BLD VENIPUNCTURE: CPT | Performed by: PHYSICIAN ASSISTANT

## 2024-08-14 PROCEDURE — 36415 COLL VENOUS BLD VENIPUNCTURE: CPT

## 2024-08-14 PROCEDURE — 83735 ASSAY OF MAGNESIUM: CPT | Performed by: EMERGENCY MEDICINE

## 2024-08-14 PROCEDURE — 84100 ASSAY OF PHOSPHORUS: CPT | Performed by: EMERGENCY MEDICINE

## 2024-08-14 PROCEDURE — 96375 TX/PRO/DX INJ NEW DRUG ADDON: CPT

## 2024-08-14 PROCEDURE — 96365 THER/PROPH/DIAG IV INF INIT: CPT

## 2024-08-14 PROCEDURE — 85610 PROTHROMBIN TIME: CPT | Performed by: EMERGENCY MEDICINE

## 2024-08-14 RX ORDER — NALTREXONE HYDROCHLORIDE 50 MG/1
50 TABLET, FILM COATED ORAL DAILY
COMMUNITY

## 2024-08-14 RX ORDER — HYDRALAZINE HYDROCHLORIDE 10 MG/1
10 TABLET, FILM COATED ORAL EVERY 4 HOURS PRN
Status: DISCONTINUED | OUTPATIENT
Start: 2024-08-14 | End: 2024-08-15 | Stop reason: HOSPADM

## 2024-08-14 RX ORDER — AMOXICILLIN 250 MG
1 CAPSULE ORAL 2 TIMES DAILY PRN
Status: DISCONTINUED | OUTPATIENT
Start: 2024-08-14 | End: 2024-08-15 | Stop reason: HOSPADM

## 2024-08-14 RX ORDER — FLUMAZENIL 0.1 MG/ML
0.2 INJECTION, SOLUTION INTRAVENOUS
Status: DISCONTINUED | OUTPATIENT
Start: 2024-08-14 | End: 2024-08-14

## 2024-08-14 RX ORDER — MAGNESIUM SULFATE HEPTAHYDRATE 40 MG/ML
2 INJECTION, SOLUTION INTRAVENOUS ONCE
Status: DISCONTINUED | OUTPATIENT
Start: 2024-08-14 | End: 2024-08-14

## 2024-08-14 RX ORDER — FOLIC ACID 1 MG/1
1 TABLET ORAL DAILY
Status: DISCONTINUED | OUTPATIENT
Start: 2024-08-15 | End: 2024-08-15 | Stop reason: HOSPADM

## 2024-08-14 RX ORDER — GABAPENTIN 600 MG/1
1200 TABLET ORAL ONCE
Status: COMPLETED | OUTPATIENT
Start: 2024-08-14 | End: 2024-08-14

## 2024-08-14 RX ORDER — GABAPENTIN 100 MG/1
100 CAPSULE ORAL EVERY 8 HOURS
Status: DISCONTINUED | OUTPATIENT
Start: 2024-08-21 | End: 2024-08-15 | Stop reason: HOSPADM

## 2024-08-14 RX ORDER — DIAZEPAM 10 MG/2ML
5 INJECTION, SOLUTION INTRAMUSCULAR; INTRAVENOUS ONCE
Status: COMPLETED | OUTPATIENT
Start: 2024-08-14 | End: 2024-08-14

## 2024-08-14 RX ORDER — DIAZEPAM 10 MG/2ML
5-10 INJECTION, SOLUTION INTRAMUSCULAR; INTRAVENOUS EVERY 30 MIN PRN
Status: DISCONTINUED | OUTPATIENT
Start: 2024-08-14 | End: 2024-08-14

## 2024-08-14 RX ORDER — LORAZEPAM 1 MG/1
1-2 TABLET ORAL EVERY 30 MIN PRN
Status: DISCONTINUED | OUTPATIENT
Start: 2024-08-14 | End: 2024-08-15 | Stop reason: HOSPADM

## 2024-08-14 RX ORDER — OLANZAPINE 5 MG/1
5-10 TABLET, ORALLY DISINTEGRATING ORAL EVERY 6 HOURS PRN
Status: DISCONTINUED | OUTPATIENT
Start: 2024-08-14 | End: 2024-08-15 | Stop reason: HOSPADM

## 2024-08-14 RX ORDER — MULTIPLE VITAMINS W/ MINERALS TAB 9MG-400MCG
1 TAB ORAL DAILY
Status: DISCONTINUED | OUTPATIENT
Start: 2024-08-15 | End: 2024-08-15 | Stop reason: HOSPADM

## 2024-08-14 RX ORDER — GABAPENTIN 300 MG/1
900 CAPSULE ORAL EVERY 8 HOURS
Status: DISCONTINUED | OUTPATIENT
Start: 2024-08-14 | End: 2024-08-15 | Stop reason: HOSPADM

## 2024-08-14 RX ORDER — DIAZEPAM 5 MG
10 TABLET ORAL EVERY 30 MIN PRN
Status: DISCONTINUED | OUTPATIENT
Start: 2024-08-14 | End: 2024-08-14

## 2024-08-14 RX ORDER — ONDANSETRON 2 MG/ML
4 INJECTION INTRAMUSCULAR; INTRAVENOUS ONCE
Status: COMPLETED | OUTPATIENT
Start: 2024-08-14 | End: 2024-08-14

## 2024-08-14 RX ORDER — MINERAL OIL/HYDROPHIL PETROLAT
OINTMENT (GRAM) TOPICAL
Status: DISCONTINUED | OUTPATIENT
Start: 2024-08-14 | End: 2024-08-15 | Stop reason: HOSPADM

## 2024-08-14 RX ORDER — MAGNESIUM SULFATE HEPTAHYDRATE 40 MG/ML
2 INJECTION, SOLUTION INTRAVENOUS ONCE
Status: COMPLETED | OUTPATIENT
Start: 2024-08-14 | End: 2024-08-14

## 2024-08-14 RX ORDER — SODIUM CHLORIDE, SODIUM LACTATE, POTASSIUM CHLORIDE, CALCIUM CHLORIDE 600; 310; 30; 20 MG/100ML; MG/100ML; MG/100ML; MG/100ML
INJECTION, SOLUTION INTRAVENOUS CONTINUOUS
Status: DISCONTINUED | OUTPATIENT
Start: 2024-08-14 | End: 2024-08-15

## 2024-08-14 RX ORDER — GABAPENTIN 300 MG/1
600 CAPSULE ORAL EVERY 8 HOURS
Status: DISCONTINUED | OUTPATIENT
Start: 2024-08-17 | End: 2024-08-14

## 2024-08-14 RX ORDER — GABAPENTIN 100 MG/1
300 CAPSULE ORAL EVERY 8 HOURS
Status: DISCONTINUED | OUTPATIENT
Start: 2024-08-19 | End: 2024-08-14

## 2024-08-14 RX ORDER — AMOXICILLIN 250 MG
2 CAPSULE ORAL 2 TIMES DAILY PRN
Status: DISCONTINUED | OUTPATIENT
Start: 2024-08-14 | End: 2024-08-15 | Stop reason: HOSPADM

## 2024-08-14 RX ORDER — LORAZEPAM 2 MG/ML
1-2 INJECTION INTRAMUSCULAR EVERY 30 MIN PRN
Status: DISCONTINUED | OUTPATIENT
Start: 2024-08-14 | End: 2024-08-15 | Stop reason: HOSPADM

## 2024-08-14 RX ORDER — DIPHENHYDRAMINE HYDROCHLORIDE 50 MG/ML
25 INJECTION INTRAMUSCULAR; INTRAVENOUS ONCE
Status: COMPLETED | OUTPATIENT
Start: 2024-08-14 | End: 2024-08-14

## 2024-08-14 RX ORDER — GABAPENTIN 100 MG/1
100 CAPSULE ORAL EVERY 8 HOURS
Status: DISCONTINUED | OUTPATIENT
Start: 2024-08-21 | End: 2024-08-14

## 2024-08-14 RX ORDER — CALCIUM CARBONATE 500 MG/1
1000 TABLET, CHEWABLE ORAL 4 TIMES DAILY PRN
Status: DISCONTINUED | OUTPATIENT
Start: 2024-08-14 | End: 2024-08-15 | Stop reason: HOSPADM

## 2024-08-14 RX ORDER — HALOPERIDOL 5 MG/ML
2.5-5 INJECTION INTRAMUSCULAR EVERY 6 HOURS PRN
Status: DISCONTINUED | OUTPATIENT
Start: 2024-08-14 | End: 2024-08-14

## 2024-08-14 RX ORDER — GABAPENTIN 300 MG/1
900 CAPSULE ORAL EVERY 8 HOURS
Status: DISCONTINUED | OUTPATIENT
Start: 2024-08-14 | End: 2024-08-14

## 2024-08-14 RX ORDER — LEVOTHYROXINE SODIUM 75 UG/1
75 TABLET ORAL DAILY
Status: DISCONTINUED | OUTPATIENT
Start: 2024-08-15 | End: 2024-08-15 | Stop reason: HOSPADM

## 2024-08-14 RX ORDER — HALOPERIDOL 5 MG/ML
2.5-5 INJECTION INTRAMUSCULAR EVERY 6 HOURS PRN
Status: DISCONTINUED | OUTPATIENT
Start: 2024-08-14 | End: 2024-08-15 | Stop reason: HOSPADM

## 2024-08-14 RX ORDER — METOCLOPRAMIDE HYDROCHLORIDE 5 MG/ML
10 INJECTION INTRAMUSCULAR; INTRAVENOUS ONCE
Status: COMPLETED | OUTPATIENT
Start: 2024-08-14 | End: 2024-08-14

## 2024-08-14 RX ORDER — GABAPENTIN 300 MG/1
300 CAPSULE ORAL EVERY 8 HOURS
Status: DISCONTINUED | OUTPATIENT
Start: 2024-08-19 | End: 2024-08-15 | Stop reason: HOSPADM

## 2024-08-14 RX ORDER — FLUMAZENIL 0.1 MG/ML
0.2 INJECTION, SOLUTION INTRAVENOUS
Status: DISCONTINUED | OUTPATIENT
Start: 2024-08-14 | End: 2024-08-15 | Stop reason: HOSPADM

## 2024-08-14 RX ORDER — HYDRALAZINE HYDROCHLORIDE 20 MG/ML
10 INJECTION INTRAMUSCULAR; INTRAVENOUS EVERY 4 HOURS PRN
Status: DISCONTINUED | OUTPATIENT
Start: 2024-08-14 | End: 2024-08-15 | Stop reason: HOSPADM

## 2024-08-14 RX ORDER — LIDOCAINE 40 MG/G
CREAM TOPICAL
Status: DISCONTINUED | OUTPATIENT
Start: 2024-08-14 | End: 2024-08-15 | Stop reason: HOSPADM

## 2024-08-14 RX ORDER — GABAPENTIN 300 MG/1
600 CAPSULE ORAL EVERY 8 HOURS
Status: DISCONTINUED | OUTPATIENT
Start: 2024-08-17 | End: 2024-08-15 | Stop reason: HOSPADM

## 2024-08-14 RX ORDER — OLANZAPINE 5 MG/1
5-10 TABLET, ORALLY DISINTEGRATING ORAL EVERY 6 HOURS PRN
Status: DISCONTINUED | OUTPATIENT
Start: 2024-08-14 | End: 2024-08-14

## 2024-08-14 RX ADMIN — GABAPENTIN 1200 MG: 600 TABLET, FILM COATED ORAL at 09:11

## 2024-08-14 RX ADMIN — METOCLOPRAMIDE 10 MG: 5 INJECTION, SOLUTION INTRAMUSCULAR; INTRAVENOUS at 13:21

## 2024-08-14 RX ADMIN — ONDANSETRON 4 MG: 2 INJECTION INTRAMUSCULAR; INTRAVENOUS at 08:48

## 2024-08-14 RX ADMIN — SODIUM CHLORIDE 1000 ML: 9 INJECTION, SOLUTION INTRAVENOUS at 08:51

## 2024-08-14 RX ADMIN — DIAZEPAM 5 MG: 10 INJECTION, SOLUTION INTRAMUSCULAR; INTRAVENOUS at 09:09

## 2024-08-14 RX ADMIN — DIPHENHYDRAMINE HYDROCHLORIDE 25 MG: 50 INJECTION, SOLUTION INTRAMUSCULAR; INTRAVENOUS at 13:21

## 2024-08-14 RX ADMIN — MAGNESIUM SULFATE HEPTAHYDRATE 2 G: 40 INJECTION, SOLUTION INTRAVENOUS at 10:08

## 2024-08-14 RX ADMIN — SODIUM CHLORIDE, POTASSIUM CHLORIDE, SODIUM LACTATE AND CALCIUM CHLORIDE: 600; 310; 30; 20 INJECTION, SOLUTION INTRAVENOUS at 15:54

## 2024-08-14 RX ADMIN — SODIUM PHOSPHATE, MONOBASIC, MONOHYDRATE AND SODIUM PHOSPHATE, DIBASIC, ANHYDROUS 15 MMOL: 142; 276 INJECTION, SOLUTION INTRAVENOUS at 11:05

## 2024-08-14 RX ADMIN — GABAPENTIN 900 MG: 300 CAPSULE ORAL at 17:31

## 2024-08-14 RX ADMIN — DIAZEPAM 5 MG: 10 INJECTION, SOLUTION INTRAMUSCULAR; INTRAVENOUS at 08:50

## 2024-08-14 RX ADMIN — FOLIC ACID: 5 INJECTION, SOLUTION INTRAMUSCULAR; INTRAVENOUS; SUBCUTANEOUS at 09:13

## 2024-08-14 RX ADMIN — ONDANSETRON 4 MG: 2 INJECTION INTRAMUSCULAR; INTRAVENOUS at 11:51

## 2024-08-14 ASSESSMENT — LIFESTYLE VARIABLES
AUDITORY DISTURBANCES: NOT PRESENT
ANXIETY: NO ANXIETY, AT EASE
TOTAL SCORE: 3
ANXIETY: NO ANXIETY, AT EASE
NAUSEA AND VOMITING: NO NAUSEA AND NO VOMITING
AGITATION: NORMAL ACTIVITY
ANXIETY: NO ANXIETY, AT EASE
HEADACHE, FULLNESS IN HEAD: NOT PRESENT
TOTAL SCORE: 17
PAROXYSMAL SWEATS: NO SWEAT VISIBLE
ANXIETY: 3
HEADACHE, FULLNESS IN HEAD: NOT PRESENT
TOTAL SCORE: 6
VISUAL DISTURBANCES: NOT PRESENT
PAROXYSMAL SWEATS: NO SWEAT VISIBLE
TREMOR: 3
AGITATION: NORMAL ACTIVITY
ANXIETY: NO ANXIETY, AT EASE
ORIENTATION AND CLOUDING OF SENSORIUM: ORIENTED AND CAN DO SERIAL ADDITIONS
ORIENTATION AND CLOUDING OF SENSORIUM: ORIENTED AND CAN DO SERIAL ADDITIONS
ANXIETY: 3
AUDITORY DISTURBANCES: NOT PRESENT
AUDITORY DISTURBANCES: NOT PRESENT
PAROXYSMAL SWEATS: NO SWEAT VISIBLE
HEADACHE, FULLNESS IN HEAD: NOT PRESENT
VISUAL DISTURBANCES: NOT PRESENT
AUDITORY DISTURBANCES: NOT PRESENT
NAUSEA AND VOMITING: NO NAUSEA AND NO VOMITING
PAROXYSMAL SWEATS: NO SWEAT VISIBLE
TREMOR: 2
AGITATION: SOMEWHAT MORE THAN NORMAL ACTIVITY
NAUSEA AND VOMITING: 2
TREMOR: NOT VISIBLE, BUT CAN BE FELT FINGERTIP TO FINGERTIP
TREMOR: 5
NAUSEA AND VOMITING: NO NAUSEA AND NO VOMITING
PAROXYSMAL SWEATS: NO SWEAT VISIBLE
HEADACHE, FULLNESS IN HEAD: NOT PRESENT
ORIENTATION AND CLOUDING OF SENSORIUM: ORIENTED AND CAN DO SERIAL ADDITIONS
TREMOR: 3
TREMOR: 5
AGITATION: NORMAL ACTIVITY
VISUAL DISTURBANCES: NOT PRESENT
AUDITORY DISTURBANCES: MODERATELY SEVERE HALLUCINATIONS
ORIENTATION AND CLOUDING OF SENSORIUM: ORIENTED AND CAN DO SERIAL ADDITIONS
PAROXYSMAL SWEATS: NO SWEAT VISIBLE
HEADACHE, FULLNESS IN HEAD: NOT PRESENT
TOTAL SCORE: 3
HEADACHE, FULLNESS IN HEAD: NOT PRESENT
VISUAL DISTURBANCES: NOT PRESENT
AUDITORY DISTURBANCES: NOT PRESENT
VISUAL DISTURBANCES: NOT PRESENT
TOTAL SCORE: 10
AGITATION: 2
TOTAL SCORE: 3
NAUSEA AND VOMITING: INTERMITTENT NAUSEA WITH DRY HEAVES
VISUAL DISTURBANCES: NOT PRESENT
AGITATION: NORMAL ACTIVITY
ORIENTATION AND CLOUDING OF SENSORIUM: ORIENTED AND CAN DO SERIAL ADDITIONS
NAUSEA AND VOMITING: INTERMITTENT NAUSEA WITH DRY HEAVES
ORIENTATION AND CLOUDING OF SENSORIUM: ORIENTED AND CAN DO SERIAL ADDITIONS

## 2024-08-14 ASSESSMENT — ACTIVITIES OF DAILY LIVING (ADL)
ADLS_ACUITY_SCORE: 38
ADLS_ACUITY_SCORE: 25
ADLS_ACUITY_SCORE: 38
ADLS_ACUITY_SCORE: 25
ADLS_ACUITY_SCORE: 38
ADLS_ACUITY_SCORE: 38
ADLS_ACUITY_SCORE: 25
ADLS_ACUITY_SCORE: 38
ADLS_ACUITY_SCORE: 38

## 2024-08-14 ASSESSMENT — COLUMBIA-SUICIDE SEVERITY RATING SCALE - C-SSRS
6. HAVE YOU EVER DONE ANYTHING, STARTED TO DO ANYTHING, OR PREPARED TO DO ANYTHING TO END YOUR LIFE?: NO
2. HAVE YOU ACTUALLY HAD ANY THOUGHTS OF KILLING YOURSELF IN THE PAST MONTH?: NO
1. IN THE PAST MONTH, HAVE YOU WISHED YOU WERE DEAD OR WISHED YOU COULD GO TO SLEEP AND NOT WAKE UP?: NO

## 2024-08-14 NOTE — ED TRIAGE NOTES
Pt arrives in wheelchair with sister for ETOH withdrawal. Last drink 3-4 days ago. Has not eaten in three days and vomiting for two days. Pt states she has a history of treatment for alcoholism. Drinks vodka and is unsure of daily amount.

## 2024-08-14 NOTE — PHARMACY-ADMISSION MEDICATION HISTORY
Pharmacist Admission Medication History    Admission medication history is complete. The information provided in this note is only as accurate as the sources available at the time of the update.    Information Source(s): Patient via in-person    Pertinent Information: None    Changes made to PTA medication list:  Added: naltrexone  Deleted: None  Changed: None    Allergies reviewed with patient and updates made in EHR: no    Medication History Completed By: Rivka Jacobsen RPH 8/14/2024 9:33 AM    PTA Med List   Medication Sig Last Dose    albuterol (PROAIR HFA/PROVENTIL HFA/VENTOLIN HFA) 108 (90 Base) MCG/ACT inhaler Inhale 2 puffs into the lungs every 6 hours as needed for shortness of breath, wheezing or cough PRN at PRN    levothyroxine (SYNTHROID/LEVOTHROID) 75 MCG tablet Take 75 mcg by mouth daily 8/13/2024 at AM    naltrexone (DEPADE/REVIA) 50 MG tablet Take 50 mg by mouth daily 8/13/2024 at AM    norethindrone (MICRONOR) 0.35 MG tablet Take 0.35 mg by mouth daily Past Week at unknown    sertraline (ZOLOFT) 50 MG tablet Take 2 tablets (100 mg) by mouth daily Past Week at unknown    traZODone (DESYREL) 50 MG tablet Take  mg by mouth nightly as needed PRN at PRN

## 2024-08-14 NOTE — H&P
[facility]    Hospitalist History and Physical    Name: Carley Maldonado    MRN: [id number]  YOB: 1990    Age: 33 year old  Date of Admission:  8/14/2024  Date of Service (when I saw the patient): 08/14/24    Assessment & Plan   Carley Maldonado is a 33 year old female with PMH significant for alcohol use disorder with previous alcohol withdrawal complicated by seizures who was recently admitted in May and June 2024 for these issues.  Unfortunately the patient continues to consume alcohol and returns to the ED on 8/14/2024 for evaluation of alcohol withdrawal symptoms with her last drink 3 to 4 days ago.    ED workup reveals: tachycardic, hypertensive, qualitative hCG negative, platelet count of 125, sodium of 126, CO2 of 17, anion gap of 27, chloride of 82, glucose of 127, alkaline phosphatase of 76, AST of 367, ALT of 108, total bilirubin of 1.4, EtOH negative, INR WNL, lipase mildly elevated at 81, magnesium of 1.6, phosphorus of 1.8, initial lactic acid of 5.7 with VBG showing pH of 7.48, pCO2 of 27, bicarbonate of 20, pO2 of 34, ammonia WNL, and EKG shows rate of 123 bpm in sinus tachycardia with short MA, T wave abnormality, consider inferior ischemia.    #Acute alcohol withdrawal  #Severe alcohol use disorder  Ongoing alcohol use of half liter of vodka daily with last drink on 8/10. Patient had been sober since her previous hospital stay in 06/2024 and went to rehab the month of July but reports relapsing shortly after returning home. Patient reports tremors, nausea with multiple episodes of emesis, and unsteady gait. Denies any seizures since she stopped drinking this time. Denies hallucinations. She did previously use Gabapentin prescribed when discharged in 06/2024 and felt it was helpful for alcohol cravings.   -IVF hydration with LR at 100 ml/hour  -ADAT   -CIWA protocol with scheduled Gabapentin and symptom triggered Ativan  -MVI, thiamine, and folate   -patient  declining CD consult while admitted, states she has family support to set up rehab upon discharge     #Anion gap metabolic acidosis due to lactic acidosis  Initial lactic acid of 5.7 with reassuring VBG, CO2 of 17 and anion gap of 27. Suspect related to hypovolemia from alcohol use and GI losses. Lactic acid improved to 1.3 on recheck.   -IVF hydration with LR at 100 ml/hour     #Hyponatremia  #Hypomagnesemia  #Hypophosphatemia  Initial sodium of 126, chloride of 82, magnesium of 1.6 and phosphorus of 1.8. Due to lack of PO intake PTA as well ongoing GI losses from vomiting.   -IVF hydration with LR at 100 ml/hour   -magnesium and phosphorus replacement protocols ordered   -follow BMP daily     #Transaminitis  Increased from previous with AST of 367 and ALT of 108 along with total bilirubin of 1.4. Due to alcohol use.   -follow LFTs    #Thrombocytopenia  Platelet count of 125, due to bone marrow suppression from alcohol use. No current evidence of bleeding.  -continue to monitor CBC    Clinically Significant Risk Factors Present on Admission         # Hyponatremia: Lowest Na = 126 mmol/L in last 2 days, will monitor as appropriate  # Hypocalcemia: Lowest Ca = 8 mg/dL in last 2 days, will monitor and replace as appropriate   # Hypomagnesemia: Lowest Mg = 1.6 mg/dL in last 2 days, will replace as needed  # Anion Gap Metabolic Acidosis: Highest Anion Gap = 27 mmol/L in last 2 days, will monitor and treat as appropriate    # Thrombocytopenia: Lowest platelets = 125 in last 2 days, will monitor for bleeding                   DVT Prophylaxis: Pneumatic Compression Devices  Code Status: Full Code, discussed with patient   Disposition: Expected stay >2 midnights, will admit to inpatient     Primary Care Physician   None    Chief Complaint   Alcohol withdrawal symptoms    History obtained from discussion with ED provider, Dr. Gallagher, chart review, and interview with patient.     History of Present Illness   Carley Maldonado  is a 33 year old female who presents with alcohol withdrawal symptoms.  The patient states that after her previous hospitalization she did attend rehab the month of July at a facility called [facility].  The patient lives alone and upon returning home restarted drinking shortly after.  She reports drinking approximately half a liter of vodka per day.  She stopped drinking on 8/10.  On 8/12 she had onset of nausea with multiple episodes of emesis.  She states her last episode was this morning and only bile.  She states she has been unable to keep anything down including water.  She has been tremulous and unsteady on her feet.  She states she ran into her dresser in her room when attempting to get up in the middle the night.  Denies any lightheadedness, dizziness, chest pain, shortness of breath, headache, abdominal pain, diarrhea, numbness/tingling, seizures since stopping alcohol use, or hallucinations.  Patient understands that she needs to stop drinking and is planning to go back to rehab after this hospital stay.    Past Medical History    Past Medical History:   Diagnosis Date    Alcohol abuse     Alcohol withdrawal (H)      Past Surgical History   Past Surgical History:   Procedure Laterality Date    APPENDECTOMY OPEN      in 2nd grade    wisdom teeth  2009     Prior to Admission Medications   Prior to Admission Medications   Prescriptions Last Dose Informant Patient Reported? Taking?   albuterol (PROAIR HFA/PROVENTIL HFA/VENTOLIN HFA) 108 (90 Base) MCG/ACT inhaler PRN at PRN Self Yes Yes   Sig: Inhale 2 puffs into the lungs every 6 hours as needed for shortness of breath, wheezing or cough   levothyroxine (SYNTHROID/LEVOTHROID) 75 MCG tablet 8/13/2024 at AM Self Yes Yes   Sig: Take 75 mcg by mouth daily   naltrexone (DEPADE/REVIA) 50 MG tablet 8/13/2024 at AM Self Yes Yes   Sig: Take 50 mg by mouth daily   norethindrone (MICRONOR) 0.35 MG tablet Past Week at unknown Self Yes Yes   Sig: Take 0.35 mg by mouth  daily   sertraline (ZOLOFT) 50 MG tablet Past Week at unknown Self No Yes   Sig: Take 2 tablets (100 mg) by mouth daily   traZODone (DESYREL) 50 MG tablet PRN at PRN Self Yes Yes   Sig: Take  mg by mouth nightly as needed      Facility-Administered Medications: None     Allergies   Allergies   Allergen Reactions    Bromine      rash    Nickel      rash     Social History   Social History     Tobacco Use    Smoking status: Never    Smokeless tobacco: Never   Substance Use Topics    Alcohol use: Yes     Social History     Social History Narrative    Single, living with brothers family, works at [employer]     Family History   Family history reviewed with patient and significant for father and brother with alcohol abuse.     Review of Systems   A Comprehensive greater than 10 system review of systems was carried out.  Pertinent positives and negatives are noted above.  Otherwise negative for contributory information.    Physical Exam   Temp: 98.9  F (37.2  C) Temp src: Temporal BP: 136/68 Pulse: 116   Resp: 16 SpO2: 98 % O2 Device: None (Room air)    Vital Signs with Ranges  Temp:  [98.9  F (37.2  C)] 98.9  F (37.2  C)  Pulse:  [115-138] 116  Resp:  [12-26] 16  BP: (103-142)/(57-96) 136/68  SpO2:  [94 %-100 %] 98 %  118 lbs 0 oz    GEN:  Alert, oriented x 3, appears comfortable sitting on gurney, no overt distress  HEENT:  Normocephalic/atraumatic, no scleral icterus, no nasal discharge, mouth moist.  CV: tachycardic, regular rhythm, no murmur or JVD.  S1 + S2 noted, no S3 or S4.  LUNGS:  Clear to auscultation bilaterally without rales/rhonchi/wheezing/retractions.  Symmetric chest rise on inhalation noted.  ABD:  Active bowel sounds, soft, non-tender/non-distended.  No rebound/guarding/rigidity.  EXT:  No LE edema.  No cyanosis.  No acute joint synovitis noted.  SKIN:  Dry to touch, excoriation to bilateral feet with healing scabs and mild surrounding erythema.   NEURO:  Symmetric muscle strength, sensation to touch  grossly intact.  Coordination symmetric on general exam. Tremulous.     Data   Data reviewed today:  I personally reviewed EKG showing rate of 123 bpm in sinus tachycardia with short NC, T wave abnormality, consider inferior ischemia.    Results for orders placed or performed during the hospital encounter of 08/14/24   Brilliant Draw     Status: None    Narrative    The following orders were created for panel order Brilliant Draw.  Procedure                               Abnormality         Status                     ---------                               -----------         ------                     Extra Blue Top Tube[[id number]]                              Final result               Extra Red Top Tube[[id number]]                               Final result               Extra Green Top (Lithium...[[id number]]                      Final result               Extra Purple Top Tube[[id number]]                            Final result                 Please view results for these tests on the individual orders.   Extra Blue Top Tube     Status: None   Result Value Ref Range    Hold Specimen JIC    Extra Red Top Tube     Status: None   Result Value Ref Range    Hold Specimen JIC    Extra Green Top (Lithium Heparin) Tube     Status: None   Result Value Ref Range    Hold Specimen JIC    Extra Purple Top Tube     Status: None   Result Value Ref Range    Hold Specimen JIC    Ammonia     Status: Normal   Result Value Ref Range    Ammonia 27 11 - 51 umol/L   HCG QUALitative pregnancy (blood)     Status: Normal   Result Value Ref Range    hCG Serum Qualitative Negative Negative   Comprehensive metabolic panel     Status: Abnormal   Result Value Ref Range    Sodium 126 (L) 135 - 145 mmol/L    Potassium 4.1 3.4 - 5.3 mmol/L    Carbon Dioxide (CO2) 17 (L) 22 - 29 mmol/L    Anion Gap 27 (H) 7 - 15 mmol/L    Urea Nitrogen 14.2 6.0 - 20.0 mg/dL    Creatinine 0.53 0.51 - 0.95 mg/dL    GFR Estimate >90 >60 mL/min/1.73m2    Calcium 8.0 (L)  8.8 - 10.4 mg/dL    Chloride 82 (L) 98 - 107 mmol/L    Glucose 127 (H) 70 - 99 mg/dL    Alkaline Phosphatase 76 40 - 150 U/L     (H) 0 - 45 U/L     (H) 0 - 50 U/L    Protein Total 7.1 6.4 - 8.3 g/dL    Albumin 4.3 3.5 - 5.2 g/dL    Bilirubin Total 1.4 (H) <=1.2 mg/dL   Ethyl Alcohol Level     Status: Normal   Result Value Ref Range    Alcohol ethyl <0.01 <=0.01 g/dL   INR     Status: Normal   Result Value Ref Range    INR 0.95 0.85 - 1.15   Lipase     Status: Abnormal   Result Value Ref Range    Lipase 81 (H) 13 - 60 U/L   Magnesium     Status: Abnormal   Result Value Ref Range    Magnesium 1.6 (L) 1.7 - 2.3 mg/dL   Phosphorus     Status: Abnormal   Result Value Ref Range    Phosphorus 1.8 (L) 2.5 - 4.5 mg/dL   CBC with platelets and differential     Status: Abnormal   Result Value Ref Range    WBC Count 5.0 4.0 - 11.0 10e3/uL    RBC Count 4.05 3.80 - 5.20 10e6/uL    Hemoglobin 13.0 11.7 - 15.7 g/dL    Hematocrit 36.5 35.0 - 47.0 %    MCV 90 78 - 100 fL    MCH 32.1 26.5 - 33.0 pg    MCHC 35.6 31.5 - 36.5 g/dL    RDW 11.9 10.0 - 15.0 %    Platelet Count 125 (L) 150 - 450 10e3/uL    % Neutrophils 74 %    % Lymphocytes 11 %    % Monocytes 14 %    % Eosinophils 0 %    % Basophils 0 %    % Immature Granulocytes 0 %    NRBCs per 100 WBC 0 <1 /100    Absolute Neutrophils 3.7 1.6 - 8.3 10e3/uL    Absolute Lymphocytes 0.6 (L) 0.8 - 5.3 10e3/uL    Absolute Monocytes 0.7 0.0 - 1.3 10e3/uL    Absolute Eosinophils 0.0 0.0 - 0.7 10e3/uL    Absolute Basophils 0.0 0.0 - 0.2 10e3/uL    Absolute Immature Granulocytes 0.0 <=0.4 10e3/uL    Absolute NRBCs 0.0 10e3/uL   iStat Gases (lactate) venous, POCT     Status: Abnormal   Result Value Ref Range    Lactic Acid POCT 5.7 (HH) <=2.0 mmol/L    Bicarbonate Venous POCT 20 (L) 21 - 28 mmol/L    O2 Sat, Venous POCT 72 70 - 75 %    pCO2 Venous POCT 27 (L) 40 - 50 mm Hg    pH Venous POCT 7.48 (H) 7.32 - 7.43    pO2 Venous POCT 34 25 - 47 mm Hg   Lactic acid whole blood      Status: Normal   Result Value Ref Range    Lactic Acid 1.3 0.7 - 2.0 mmol/L   EKG 12-lead, tracing only     Status: None (Preliminary result)   Result Value Ref Range    Systolic Blood Pressure  mmHg    Diastolic Blood Pressure  mmHg    Ventricular Rate 123 BPM    Atrial Rate 123 BPM    VT Interval 90 ms    QRS Duration 96 ms     ms    QTc 503 ms    P Axis 76 degrees    R AXIS 77 degrees    T Axis -29 degrees    Interpretation ECG       Sinus tachycardia with short VT  T wave abnormality, consider inferior ischemia  Abnormal ECG  When compared with ECG of 24-Jun-2024 16:39,  T wave inversion now evident in Inferior leads  Nonspecific T wave abnormality now evident in Lateral leads     CBC with platelets differential     Status: Abnormal    Narrative    The following orders were created for panel order CBC with platelets differential.  Procedure                               Abnormality         Status                     ---------                               -----------         ------                     CBC with platelets and d...[[id number]]  Abnormal            Final result               RBC and Platelet Morphology[[id number]]                                                   Please view results for these tests on the individual orders.     France Mendez PA-C  [facility]  Securely message with the Vocera Web Console (learn more here)  Text page via sofatutor Paging/Directory  I discussed the patient with Dr. Peraza and he agrees with the above plan.

## 2024-08-14 NOTE — ED PROVIDER NOTES
"  Emergency Department Note      History of Present Illness     Chief Complaint   Withdrawal      HPI   Carley Maldonado is a 33 year old female who presents with a complaint of EtOH withdrawal symptoms. The patient reports that her last drink was around 3-4 days ago, and since then she has been experiencing nausea and vomiting. She is unable to keep food or medications down. She reports that when she drinks, she usually has a 1/2 liter of vodka. She also notes a recent fall, with some contusions on both lower extremities from it. She denies the use of any other drugs or smoking. She denies seizures, any other pain, coughing, or rhinorrhea. She denies any history of pancreatitis. She denies the use of any medications for withdrawal.     Independent Historian   None    Review of External Notes   I reviewed her ED visit and subsequent hospitalization on June 24 for alcohol withdrawal symptoms and seizure.  Past Medical History   Medical History and Problem List   Hypothyroidism   Seizures    Medications   Ventolin   Fluticasone  Levothyroxine  Trazodone  Sertraline   Norethindrone    Surgical History   Appendectomy   [place] teeth removal   C section  Physical Exam     Patient Vitals for the past 24 hrs:   BP Temp Temp src Pulse Resp SpO2 Height Weight   08/14/24 1020 125/57 -- -- 120 13 94 % -- --   08/14/24 0939 125/74 -- -- (!) 124 19 100 % -- --   08/14/24 0920 117/76 -- -- 116 18 100 % -- --   08/14/24 0915 -- -- -- 115 20 100 % -- --   08/14/24 0900 121/72 -- -- (!) 125 12 100 % -- --   08/14/24 0845 124/74 -- -- 120 15 98 % -- --   08/14/24 0830 126/70 -- -- -- -- -- -- --   08/14/24 0827 (!) 142/96 98.9  F (37.2  C) Temporal (!) 138 26 99 % 1.6 m (5' 3\") 53.5 kg (118 lb)     Physical Exam  Constitutional: Vital signs reviewed as above  General: Alert. Uncomfortable appearing.   HEENT: Dry mucous membranes  Eyes: Conjunctiva normal.   Neck: Normal range of motion  Cardiovascular: Tachycardic Regular rhythm and " normal heart sounds.  No MRG  Pulmonary/Chest: Effort normal and breath sounds normal. No respiratory distress. Patient has no wheezes. Patient has no rales.   Abdominal: Soft. Positive bowel sounds. No MRG.  Musculoskeletal/Extremities: Full ROM.  Endo: No pitting edema  Neurological: Alert, no focal deficits.  Skin: Skin is warm and dry. Multiple contusions on bilateral lower extremities.   Psychiatric: Pleasant   Diagnostics     Lab Results   Labs Ordered and Resulted from Time of ED Arrival to Time of ED Departure   COMPREHENSIVE METABOLIC PANEL - Abnormal       Result Value    Sodium 126 (*)     Potassium 4.1      Carbon Dioxide (CO2) 17 (*)     Anion Gap 27 (*)     Urea Nitrogen 14.2      Creatinine 0.53      GFR Estimate >90      Calcium 8.0 (*)     Chloride 82 (*)     Glucose 127 (*)     Alkaline Phosphatase 76       (*)      (*)     Protein Total 7.1      Albumin 4.3      Bilirubin Total 1.4 (*)    LIPASE - Abnormal    Lipase 81 (*)    MAGNESIUM - Abnormal    Magnesium 1.6 (*)    PHOSPHORUS - Abnormal    Phosphorus 1.8 (*)    CBC WITH PLATELETS AND DIFFERENTIAL - Abnormal    WBC Count 5.0      RBC Count 4.05      Hemoglobin 13.0      Hematocrit 36.5      MCV 90      MCH 32.1      MCHC 35.6      RDW 11.9      Platelet Count 125 (*)     % Neutrophils 74      % Lymphocytes 11      % Monocytes 14      % Eosinophils 0      % Basophils 0      % Immature Granulocytes 0      NRBCs per 100 WBC 0      Absolute Neutrophils 3.7      Absolute Lymphocytes 0.6 (*)     Absolute Monocytes 0.7      Absolute Eosinophils 0.0      Absolute Basophils 0.0      Absolute Immature Granulocytes 0.0      Absolute NRBCs 0.0     ISTAT GASES LACTATE VENOUS POCT - Abnormal    Lactic Acid POCT 5.7 (*)     Bicarbonate Venous POCT 20 (*)     O2 Sat, Venous POCT 72      pCO2 Venous POCT 27 (*)     pH Venous POCT 7.48 (*)     pO2 Venous POCT 34     AMMONIA - Normal    Ammonia 27     HCG QUALITATIVE PREGNANCY - Normal    hCG Serum  Qualitative Negative     ETHYL ALCOHOL LEVEL - Normal    Alcohol ethyl <0.01     INR - Normal    INR 0.95         Imaging   None    EKG   ECG results from 08/14/24   EKG 12-lead, tracing only     Value    Systolic Blood Pressure     Diastolic Blood Pressure     Ventricular Rate 123    Atrial Rate 123    SC Interval 90    QRS Duration 96        QTc 503    P Axis 76    R AXIS 77    T Axis -29    Interpretation ECG      Sinus tachycardia with short SC  T wave abnormality, consider inferior ischemia  Read by Dr Gallagher, [id number]          Independent Interpretation   EKG from today shows sinus tachycardia, rate of 123, no acute concerning ST or T wave changes.  ED Course      Medications Administered   Medications   OLANZapine zydis (zyPREXA) ODT tab 5-10 mg (has no administration in time range)     Or   haloperidol lactate (HALDOL) injection 2.5-5 mg (has no administration in time range)   flumazenil (ROMAZICON) injection 0.2 mg (has no administration in time range)   gabapentin (NEURONTIN) capsule 900 mg (has no administration in time range)   gabapentin (NEURONTIN) capsule 600 mg (has no administration in time range)   gabapentin (NEURONTIN) capsule 300 mg (has no administration in time range)   gabapentin (NEURONTIN) capsule 100 mg (has no administration in time range)   diazepam (VALIUM) tablet 10 mg ( Oral See Alternative 8/14/24 0909)     Or   diazepam (VALIUM) injection 5-10 mg (5 mg Intravenous $Given 8/14/24 0909)   magnesium sulfate 2 g in 50 mL sterile water intermittent infusion (2 g Intravenous $New Bag 8/14/24 1008)   sodium phosphate 15 mmol in NS 250mL intermittent infusion (has no administration in time range)   sodium chloride 0.9 % 1,000 mL with Infuvite Adult 10 mL, thiamine 100 mg, folic acid 1 mg infusion ( Intravenous $New Bag 8/14/24 0913)   ondansetron (ZOFRAN) injection 4 mg (4 mg Intravenous $Given 8/14/24 0848)   diazepam (VALIUM) injection 5 mg (5 mg Intravenous $Given 8/14/24 0850)    sodium chloride 0.9% BOLUS 1,000 mL (0 mLs Intravenous Stopped 8/14/24 0951)   gabapentin (NEURONTIN) tablet 1,200 mg (1,200 mg Oral $Given 8/14/24 0911)       Procedures   None     Discussion of Management   1020 I spoke to TRACY Tapia, for the admitting hospitalist. She accepted the patient's admission.     ED Course   ED Course as of 08/14/24 1048   Wed Aug 14, 2024   0832 I initially assessed the patient and obtained the history and physical exam.        Additional Documentation  None    Medical Decision Making / Diagnosis     CMS Diagnoses: Patient's lactic acid level did come back elevated to 5.7.  However this is secondary to dehydration/alcohol use.  There are no signs of severe sepsis or septic shock.    MIPS   None    [facility]   Carley Maldonado is a 33 year old female who presents emergency department for alcohol withdrawal symptoms.  She has been seen here couple times and has been admitted.  Her last drink was 4 days ago.  She has been constantly vomiting ever since and unable to keep anything down.  She has not eaten in several days.  She is very tachycardic here and slightly shaky.  While she does have a history of alcohol withdrawal seizure she has not had any during this particular withdrawal period.  EKG does show sinus tachycardia.  Alcohol level was indeed negative.  She was given a banana bag, normal saline, Valium, and Zofran right off the bat.  I did put her on the alcohol drawl protocol.  Her lactate is elevated at 5.7 which is secondary to her dehydration from vomiting and her alcohol use.  I see no signs of infection.  She also has a low sodium at 126 and again is receiving IV fluids.  Her magnesium and phosphorus are both low at 1.6 and 1.8 respectively and I put her on electrolyte replacement protocols for both.  She has a mild elevation in her lipase to 81 but no tenderness in the left upper quadrant.  Platelets are low at 125 again secondary to her alcohol.  INR is normal.  She does  have elevation of her LFTs again consistent with alcohol use disorder.  Ammonia level here was negative.  On reevaluation she is feeling better.  She will be brought into the hospitalist service for continued hydration, electrolyte replacement, and alcohol withdrawal.  Patient has remained stable and we brought into the hospital under the care of of the hospitalist service    Disposition   The patient was admitted to the hospital.     Diagnosis     ICD-10-CM    1. Alcohol withdrawal, with unspecified complication (H)  F10.939       2. Nausea and vomiting, unspecified vomiting type  R11.2       3. Lactic acidosis  E87.20       4. Hyponatremia  E87.1       5. Hypophosphatemia  E83.39       6. Hypomagnesemia  E83.42            Scribe Disclosure:  I, Halley Kuhn, am serving as a scribe at 8:29 AM on 8/14/2024 to document services personally performed by Toan Gallagher MD based on my observations and the provider's statements to me.        Toan Gallagher MD  08/14/24 [id number]

## 2024-08-14 NOTE — ED NOTES
[facility]  ED Nurse Handoff Report    ED Chief complaint: Withdrawal  . ED Diagnosis:   Final diagnoses:   Alcohol withdrawal, with unspecified complication (H)   Nausea and vomiting, unspecified vomiting type   Lactic acidosis   Hyponatremia   Hypophosphatemia   Hypomagnesemia       Allergies:   Allergies   Allergen Reactions    Bromine      rash    Nickel      rash       Code Status: Full Code    Activity level - Baseline/Home:  independent.  Activity Level - Current:   assist of 1.   Lift room needed: No.   Bariatric: No   Needed: No   Isolation: No.   Infection: Not Applicable.     Respiratory status: Room air    Vital Signs (within 30 minutes):   Vitals:    08/14/24 0915 08/14/24 0920 08/14/24 0939 08/14/24 1020   BP:  117/76 125/74 125/57   Pulse: 115 116 (!) 124 120   Resp: 20 18 19 13   Temp:       TempSrc:       SpO2: 100% 100% 100% 94%   Weight:       Height:           Cardiac Rhythm:  ,      Pain level:    Patient confused: No.   Patient Falls Risk: activity supervised.   Elimination Status:  has not voided yet; pt has been in bed and is dehydrated; should be able to urinate on her own      Patient Report - Initial Complaint: etoh withdrawal.   Focused Assessment: brought in by sister in law for etoh withdrawal. Last drink 4 days ago. Was hallucinating people in her bedroom last night and hearing phones ringing. No hallucinations in ED. Pt has been pleasant and cooperative. CIWA score improved since arrival. Pt has been dozing off in the room and states she is feeling more comfortable.      Abnormal Results:   Labs Ordered and Resulted from Time of ED Arrival to Time of ED Departure   COMPREHENSIVE METABOLIC PANEL - Abnormal       Result Value    Sodium 126 (*)     Potassium 4.1      Carbon Dioxide (CO2) 17 (*)     Anion Gap 27 (*)     Urea Nitrogen 14.2      Creatinine 0.53      GFR Estimate >90      Calcium 8.0 (*)     Chloride 82 (*)     Glucose 127 (*)     Alkaline  Phosphatase 76       (*)      (*)     Protein Total 7.1      Albumin 4.3      Bilirubin Total 1.4 (*)    LIPASE - Abnormal    Lipase 81 (*)    MAGNESIUM - Abnormal    Magnesium 1.6 (*)    PHOSPHORUS - Abnormal    Phosphorus 1.8 (*)    CBC WITH PLATELETS AND DIFFERENTIAL - Abnormal    WBC Count 5.0      RBC Count 4.05      Hemoglobin 13.0      Hematocrit 36.5      MCV 90      MCH 32.1      MCHC 35.6      RDW 11.9      Platelet Count 125 (*)     % Neutrophils 74      % Lymphocytes 11      % Monocytes 14      % Eosinophils 0      % Basophils 0      % Immature Granulocytes 0      NRBCs per 100 WBC 0      Absolute Neutrophils 3.7      Absolute Lymphocytes 0.6 (*)     Absolute Monocytes 0.7      Absolute Eosinophils 0.0      Absolute Basophils 0.0      Absolute Immature Granulocytes 0.0      Absolute NRBCs 0.0     ISTAT GASES LACTATE VENOUS POCT - Abnormal    Lactic Acid POCT 5.7 (*)     Bicarbonate Venous POCT 20 (*)     O2 Sat, Venous POCT 72      pCO2 Venous POCT 27 (*)     pH Venous POCT 7.48 (*)     pO2 Venous POCT 34     AMMONIA - Normal    Ammonia 27     HCG QUALITATIVE PREGNANCY - Normal    hCG Serum Qualitative Negative     ETHYL ALCOHOL LEVEL - Normal    Alcohol ethyl <0.01     INR - Normal    INR 0.95          No orders to display       Treatments provided: fluids, valium, electrolyte replacement  Family Comments: sister in law was here, but left - would like to be called with update when pt goes up to the floor  OBS brochure/video discussed/provided to patient:  Yes  ED Medications:   Medications   OLANZapine zydis (zyPREXA) ODT tab 5-10 mg (has no administration in time range)     Or   haloperidol lactate (HALDOL) injection 2.5-5 mg (has no administration in time range)   flumazenil (ROMAZICON) injection 0.2 mg (has no administration in time range)   gabapentin (NEURONTIN) capsule 900 mg (has no administration in time range)   gabapentin (NEURONTIN) capsule 600 mg (has no administration in time  range)   gabapentin (NEURONTIN) capsule 300 mg (has no administration in time range)   gabapentin (NEURONTIN) capsule 100 mg (has no administration in time range)   diazepam (VALIUM) tablet 10 mg ( Oral See Alternative 8/14/24 0909)     Or   diazepam (VALIUM) injection 5-10 mg (5 mg Intravenous $Given 8/14/24 0909)   magnesium sulfate 2 g in 50 mL sterile water intermittent infusion (2 g Intravenous $New Bag 8/14/24 1008)   sodium phosphate 15 mmol in NS 250mL intermittent infusion (has no administration in time range)   sodium chloride 0.9 % 1,000 mL with Infuvite Adult 10 mL, thiamine 100 mg, folic acid 1 mg infusion ( Intravenous $New Bag 8/14/24 0913)   ondansetron (ZOFRAN) injection 4 mg (4 mg Intravenous $Given 8/14/24 0848)   diazepam (VALIUM) injection 5 mg (5 mg Intravenous $Given 8/14/24 0850)   sodium chloride 0.9% BOLUS 1,000 mL (0 mLs Intravenous Stopped 8/14/24 0951)   gabapentin (NEURONTIN) tablet 1,200 mg (1,200 mg Oral $Given 8/14/24 0911)       Drips infusing:  Yes  For the majority of the shift this patient was Green.   Interventions performed were frequent rounding.    Sepsis treatment initiated: No    Cares/treatment/interventions/medications to be completed following ED care: withdrawal management; electrolyte replacement     ED Nurse Name: Kary Esquivel RN  10:46 AM   RECEIVING UNIT ED HANDOFF REVIEW    Above ED Nurse Handoff Report was reviewed: Yes  Reviewed by: Britni Worrell RN on August 14, 2024 at 5:24 PM   I Debo called the ED to inform them the note was read: Yes

## 2024-08-14 NOTE — PROGRESS NOTES
Pt arrived to unit at 1750. Safety check complete. Pt able to void 800mL with small loose BM. Ax1 w/gb. PIV rt ac infusing LR @ 100mL/hr. CIWA - 1.

## 2024-08-15 VITALS
HEIGHT: 63 IN | OXYGEN SATURATION: 98 % | TEMPERATURE: 98.6 F | HEART RATE: 83 BPM | DIASTOLIC BLOOD PRESSURE: 76 MMHG | RESPIRATION RATE: 16 BRPM | WEIGHT: 114.5 LBS | BODY MASS INDEX: 20.29 KG/M2 | SYSTOLIC BLOOD PRESSURE: 130 MMHG

## 2024-08-15 PROBLEM — F10.239 ALCOHOL DEPENDENCE WITH WITHDRAWAL WITH COMPLICATION (H): Status: ACTIVE | Noted: 2024-08-15

## 2024-08-15 LAB
ALBUMIN SERPL BCG-MCNC: 3.4 G/DL (ref 3.5–5.2)
ALP SERPL-CCNC: 62 U/L (ref 40–150)
ALT SERPL W P-5'-P-CCNC: 73 U/L (ref 0–50)
ANION GAP SERPL CALCULATED.3IONS-SCNC: 17 MMOL/L (ref 7–15)
AST SERPL W P-5'-P-CCNC: 199 U/L (ref 0–45)
BASOPHILS # BLD AUTO: 0 10E3/UL (ref 0–0.2)
BASOPHILS NFR BLD AUTO: 0 %
BILIRUB SERPL-MCNC: 0.8 MG/DL
BUN SERPL-MCNC: 4.4 MG/DL (ref 6–20)
CALCIUM SERPL-MCNC: 7.9 MG/DL (ref 8.8–10.4)
CHLORIDE SERPL-SCNC: 91 MMOL/L (ref 98–107)
CREAT SERPL-MCNC: 0.42 MG/DL (ref 0.51–0.95)
EGFRCR SERPLBLD CKD-EPI 2021: >90 ML/MIN/1.73M2
EOSINOPHIL # BLD AUTO: 0 10E3/UL (ref 0–0.7)
EOSINOPHIL NFR BLD AUTO: 1 %
ERYTHROCYTE [DISTWIDTH] IN BLOOD BY AUTOMATED COUNT: 12.1 % (ref 10–15)
GLUCOSE SERPL-MCNC: 83 MG/DL (ref 70–99)
HCO3 SERPL-SCNC: 24 MMOL/L (ref 22–29)
HCT VFR BLD AUTO: 35.1 % (ref 35–47)
HGB BLD-MCNC: 11.9 G/DL (ref 11.7–15.7)
IMM GRANULOCYTES # BLD: 0 10E3/UL
IMM GRANULOCYTES NFR BLD: 0 %
LYMPHOCYTES # BLD AUTO: 0.8 10E3/UL (ref 0.8–5.3)
LYMPHOCYTES NFR BLD AUTO: 20 %
MAGNESIUM SERPL-MCNC: 2.1 MG/DL (ref 1.7–2.3)
MCH RBC QN AUTO: 31.4 PG (ref 26.5–33)
MCHC RBC AUTO-ENTMCNC: 33.9 G/DL (ref 31.5–36.5)
MCV RBC AUTO: 93 FL (ref 78–100)
MONOCYTES # BLD AUTO: 0.6 10E3/UL (ref 0–1.3)
MONOCYTES NFR BLD AUTO: 14 %
NEUTROPHILS # BLD AUTO: 2.7 10E3/UL (ref 1.6–8.3)
NEUTROPHILS NFR BLD AUTO: 65 %
NRBC # BLD AUTO: 0 10E3/UL
NRBC BLD AUTO-RTO: 0 /100
PLATELET # BLD AUTO: 74 10E3/UL (ref 150–450)
POTASSIUM SERPL-SCNC: 3 MMOL/L (ref 3.4–5.3)
PROT SERPL-MCNC: 6 G/DL (ref 6.4–8.3)
RBC # BLD AUTO: 3.79 10E6/UL (ref 3.8–5.2)
SODIUM SERPL-SCNC: 132 MMOL/L (ref 135–145)
WBC # BLD AUTO: 4.2 10E3/UL (ref 4–11)

## 2024-08-15 PROCEDURE — G0378 HOSPITAL OBSERVATION PER HR: HCPCS

## 2024-08-15 PROCEDURE — 250N000009 HC RX 250: Performed by: INTERNAL MEDICINE

## 2024-08-15 PROCEDURE — 96375 TX/PRO/DX INJ NEW DRUG ADDON: CPT

## 2024-08-15 PROCEDURE — 99239 HOSP IP/OBS DSCHRG MGMT >30: CPT | Performed by: INTERNAL MEDICINE

## 2024-08-15 PROCEDURE — 80053 COMPREHEN METABOLIC PANEL: CPT | Performed by: PHYSICIAN ASSISTANT

## 2024-08-15 PROCEDURE — 36415 COLL VENOUS BLD VENIPUNCTURE: CPT | Performed by: PHYSICIAN ASSISTANT

## 2024-08-15 PROCEDURE — 96376 TX/PRO/DX INJ SAME DRUG ADON: CPT

## 2024-08-15 PROCEDURE — 258N000003 HC RX IP 258 OP 636: Performed by: PHYSICIAN ASSISTANT

## 2024-08-15 PROCEDURE — 85025 COMPLETE CBC W/AUTO DIFF WBC: CPT | Performed by: PHYSICIAN ASSISTANT

## 2024-08-15 PROCEDURE — 258N000003 HC RX IP 258 OP 636: Performed by: INTERNAL MEDICINE

## 2024-08-15 PROCEDURE — 83735 ASSAY OF MAGNESIUM: CPT | Performed by: INTERNAL MEDICINE

## 2024-08-15 PROCEDURE — 250N000013 HC RX MED GY IP 250 OP 250 PS 637: Performed by: PHYSICIAN ASSISTANT

## 2024-08-15 PROCEDURE — 250N000013 HC RX MED GY IP 250 OP 250 PS 637: Performed by: INTERNAL MEDICINE

## 2024-08-15 RX ORDER — LANOLIN ALCOHOL/MO/W.PET/CERES
100 CREAM (GRAM) TOPICAL DAILY
Qty: 30 TABLET | Refills: 0 | Status: SHIPPED | OUTPATIENT
Start: 2024-08-15

## 2024-08-15 RX ORDER — LOPERAMIDE HCL 2 MG
2 CAPSULE ORAL 4 TIMES DAILY PRN
Status: DISCONTINUED | OUTPATIENT
Start: 2024-08-15 | End: 2024-08-15 | Stop reason: HOSPADM

## 2024-08-15 RX ORDER — FOLIC ACID 1 MG/1
1 TABLET ORAL DAILY
Qty: 30 TABLET | Refills: 0 | Status: SHIPPED | OUTPATIENT
Start: 2024-08-15

## 2024-08-15 RX ORDER — POTASSIUM CHLORIDE 1500 MG/1
20 TABLET, EXTENDED RELEASE ORAL ONCE
Status: COMPLETED | OUTPATIENT
Start: 2024-08-15 | End: 2024-08-15

## 2024-08-15 RX ORDER — POTASSIUM CHLORIDE 1500 MG/1
40 TABLET, EXTENDED RELEASE ORAL ONCE
Status: COMPLETED | OUTPATIENT
Start: 2024-08-15 | End: 2024-08-15

## 2024-08-15 RX ORDER — GABAPENTIN 100 MG/1
100 CAPSULE ORAL 3 TIMES DAILY
Qty: 90 CAPSULE | Refills: 0 | Status: SHIPPED | OUTPATIENT
Start: 2024-08-15 | End: 2024-09-14

## 2024-08-15 RX ADMIN — POTASSIUM CHLORIDE 40 MEQ: 1500 TABLET, EXTENDED RELEASE ORAL at 11:00

## 2024-08-15 RX ADMIN — LOPERAMIDE HYDROCHLORIDE 2 MG: 2 CAPSULE ORAL at 08:12

## 2024-08-15 RX ADMIN — THIAMINE HCL TAB 100 MG 100 MG: 100 TAB at 08:12

## 2024-08-15 RX ADMIN — POTASSIUM CHLORIDE 20 MEQ: 1500 TABLET, EXTENDED RELEASE ORAL at 13:10

## 2024-08-15 RX ADMIN — WHITE PETROLATUM: 1.75 OINTMENT TOPICAL at 08:20

## 2024-08-15 RX ADMIN — GABAPENTIN 900 MG: 300 CAPSULE ORAL at 00:03

## 2024-08-15 RX ADMIN — FOLIC ACID 1 MG: 1 TABLET ORAL at 08:12

## 2024-08-15 RX ADMIN — SODIUM CHLORIDE, POTASSIUM CHLORIDE, SODIUM LACTATE AND CALCIUM CHLORIDE: 600; 310; 30; 20 INJECTION, SOLUTION INTRAVENOUS at 01:31

## 2024-08-15 RX ADMIN — LEVOTHYROXINE SODIUM 75 MCG: 0.07 TABLET ORAL at 08:12

## 2024-08-15 RX ADMIN — SODIUM PHOSPHATE, MONOBASIC, MONOHYDRATE AND SODIUM PHOSPHATE, DIBASIC, ANHYDROUS 15 MMOL: 142; 276 INJECTION, SOLUTION INTRAVENOUS at 08:31

## 2024-08-15 RX ADMIN — GABAPENTIN 900 MG: 300 CAPSULE ORAL at 08:11

## 2024-08-15 RX ADMIN — Medication 1 TABLET: at 08:12

## 2024-08-15 ASSESSMENT — ACTIVITIES OF DAILY LIVING (ADL)
ADLS_ACUITY_SCORE: 25
ADLS_ACUITY_SCORE: 26
ADLS_ACUITY_SCORE: 25
ADLS_ACUITY_SCORE: 26
ADLS_ACUITY_SCORE: 25
ADLS_ACUITY_SCORE: 25
ADLS_ACUITY_SCORE: 26
ADLS_ACUITY_SCORE: 25
ADLS_ACUITY_SCORE: 25
ADLS_ACUITY_SCORE: 26
ADLS_ACUITY_SCORE: 25
ADLS_ACUITY_SCORE: 26
ADLS_ACUITY_SCORE: 26
ADLS_ACUITY_SCORE: 25

## 2024-08-15 NOTE — CONSULTS
Care Management Discharge Note    Discharge Date: 08/16/2024     Discharge Disposition:  Home    Discharge Services:      Handoff Referral Completed: No    Additional Information:  Pt admitted with acute alcohol withdrawal and noted to have an elevated unplanned readmission risk score of 21%. Per provider, pt will be discharging home today, no needs identified.  Please call if discharge needs arise.    Jackie Albarado RN   Inpatient Care Coordination  [facility]   Phone: [phone]

## 2024-08-15 NOTE — PLAN OF CARE
"Vss A&OX4 CIWA 0. No s/s of etoh withdrawal. Discharge isnt reviewed. Pt requested gabepentin for discharge. Dr. Moses prescribed this medicaton and it was filled here and given to patient with edu.   OBSERVATION patient END time: 1413     Problem: Adult Inpatient Plan of Care  Goal: Plan of Care Review  Description: The Plan of Care Review/Shift note should be completed every shift.  The Outcome Evaluation is a brief statement about your assessment that the patient is improving, declining, or no change.  This information will be displayed automatically on your shift  note.  Outcome: Adequate for Care Transition  Flowsheets (Taken 8/15/2024 1411)  Outcome Evaluation: CIWA 0  Plan of Care Reviewed With: patient  Overall Patient Progress: improving  Goal: Patient-Specific Goal (Individualized)  Description: You can add care plan individualizations to a care plan. Examples of Individualization might be:  \"Parent requests to be called daily at 9am for status\", \"I have a hard time hearing out of my right ear\", or \"Do not touch me to wake me up as it startles  me\".  Outcome: Adequate for Care Transition  Goal: Absence of Hospital-Acquired Illness or Injury  Outcome: Adequate for Care Transition  Intervention: Identify and Manage Fall Risk  Recent Flowsheet Documentation  Taken 8/15/2024 0816 by Heriberto Robertson RN  Safety Promotion/Fall Prevention:   activity supervised   assistive device/personal items within reach   clutter free environment maintained   lighting adjusted   mobility aid in reach   nonskid shoes/slippers when out of bed   patient and family education   room organization consistent   safety round/check completed  Intervention: Prevent Skin Injury  Recent Flowsheet Documentation  Taken 8/15/2024 0816 by Heriberto Robertson RN  Body Position: position changed independently  Device Skin Pressure Protection:   absorbent pad utilized/changed   positioning supports utilized  Goal: Optimal Comfort and " Wellbeing  Outcome: Adequate for Care Transition  Goal: Readiness for Transition of Care  Outcome: Adequate for Care Transition     Problem: Fall Injury Risk  Goal: Absence of Fall and Fall-Related Injury  Outcome: Adequate for Care Transition  Intervention: Identify and Manage Contributors  Recent Flowsheet Documentation  Taken 8/15/2024 0816 by Heriberto Robertson RN  Medication Review/Management: medications reviewed  Intervention: Promote Injury-Free Environment  Recent Flowsheet Documentation  Taken 8/15/2024 0816 by Heriberto Robertson RN  Safety Promotion/Fall Prevention:   activity supervised   assistive device/personal items within reach   clutter free environment maintained   lighting adjusted   mobility aid in reach   nonskid shoes/slippers when out of bed   patient and family education   room organization consistent   safety round/check completed     Problem: Alcohol Withdrawal  Goal: Alcohol Withdrawal Symptom Control  Outcome: Adequate for Care Transition  Goal: Optimal Neurologic Function  Outcome: Adequate for Care Transition  Goal: Readiness for Change Identified  Outcome: Adequate for Care Transition     Problem: Comorbidity Management  Goal: Maintenance of Behavioral Health Symptom Control  Outcome: Adequate for Care Transition  Intervention: Maintain Behavioral Health Symptom Control  Recent Flowsheet Documentation  Taken 8/15/2024 0816 by Heriberto Robertson RN  Medication Review/Management: medications reviewed  Goal: Blood Glucose Levels Within Targeted Range  Outcome: Adequate for Care Transition  Intervention: Monitor and Manage Glycemia  Recent Flowsheet Documentation  Taken 8/15/2024 0816 by Heriberto Robertson RN  Medication Review/Management: medications reviewed  Goal: Blood Pressure in Desired Range  Outcome: Adequate for Care Transition  Intervention: Maintain Blood Pressure Management  Recent Flowsheet Documentation  Taken 8/15/2024 0816 by Heriberto Robertson RN  Medication  Review/Management: medications reviewed  Goal: Maintenance of Seizure Control  Outcome: Adequate for Care Transition  Intervention: Maintain Seizure Symptom Control  Recent Flowsheet Documentation  Taken 8/15/2024 0816 by Heriberto Robertson RN  Medication Review/Management: medications reviewed   Goal Outcome Evaluation:      Plan of Care Reviewed With: patient    Overall Patient Progress: improvingOverall Patient Progress: improving    Outcome Evaluation: CIWA 0

## 2024-08-15 NOTE — DISCHARGE SUMMARY
[facility]  Hospitalist Discharge Summary      Date of Admission:  8/14/2024  Date of Discharge:  8/15/2024  Discharging Provider: Alverto Moses MD  Discharge Service: Hospitalist Service  Primary Care Physician   Physician No Ref-Primary    Discharge Diagnoses   Alcohol dependence  Alcohol withdrawal  Anion gap metabolic acidosis with lactic acidosis  Nausea and vomiting  Hyponatremia   Hypomagnesemia  Hypophosphatemia  Transaminitis  Thrombocytopenia    Hospital Course   Carley Maldonado is a 33 year old female with PMH significant for alcohol use disorder with previous alcohol withdrawal complicated by seizures who was recently admitted in May and June 2024 for these issues.  Unfortunately the patient continues to consume alcohol and returns to the ED on 8/14/2024 for evaluation of alcohol withdrawal symptoms with her last drink 3 to 4 days prior to admission.  In the emergency room her workup revealed hypovolemia with tachycardic, hypertensive, qualitative hCG negative, platelet count of 125, sodium of 126, CO2 of 17, anion gap of 27, chloride of 82, glucose of 127, alkaline phosphatase of 76, AST of 367, ALT of 108, total bilirubin of 1.4, EtOH negative, INR WNL, lipase mildly elevated at 81, magnesium of 1.6, phosphorus of 1.8, initial lactic acid of 5.7 with VBG showing pH of 7.48, pCO2 of 27, bicarbonate of 20, pO2 of 34, ammonia WNL, and EKG shows rate of 123 bpm in sinus tachycardia with short WA, T wave abnormality, consider inferior ischemia.     #Acute alcohol withdrawal  #Severe alcohol dependence  Patient presented with nausea, vomiting, unable to eat for days with ongoing Ongoing alcohol dependence with drinking a half liter of vodka daily with last drink on 8/10. Patient had been sober since her previous hospital stay in 06/2024 and went to rehab the month of July but reports relapsing shortly after returning home. Patient reported ongoing tremors, nausea with multiple  episodes of emesis, and unsteady gait. Denies any seizures since she stopped drinking this time. Denies hallucinations. Patient was placed on IV fluids.  Her electrolytes were replaced.  Her lactic oversewed resolved.  Patient was able to advance her diet and her CIWA scores came down to 0.  She was placed on [facility] alcohol withdrawal protocol and she did quite well.  Patient was placed on folate and thiamine.  She declined CD consult as she has support at home and knows what she needs to do upon discharge.  Patient was able to discharge the following day and was made observation status.  Sobriety was discussed with the patient.  Patient thinks that Neurontin helps her long-term at home so this has been prescribed as well.     #Anion gap metabolic acidosis due to lactic acidosis  Initial lactic acid of 5.7 with reassuring VBG, CO2 of 17 and anion gap of 27. Suspect related to hypovolemia from alcohol use and GI losses. Lactic acid improved to 1.3 on recheck after getting IV fluids..  Patient was able to come off her IV fluids when she was eating normally.       #Hyponatremia  #Hypomagnesemia  #Hypophosphatemia  Initial sodium of 126, chloride of 82, magnesium of 1.6 and phosphorus of 1.8. Due to lack of PO intake PTA as well ongoing GI losses from vomiting.  Patient was placed on IV fluids and her electrolytes improved with replacement.  Patient's diet was able to be advanced to a regular diet.     #Transaminitis  Increased from previous with AST of 367 and ALT of 108 along with total bilirubin of 1.4. Due to alcohol use.         #Thrombocytopenia  Platelet count of 125, due to bone marrow suppression from alcohol use. No current evidence of bleeding.          Clinically Significant Risk Factors          Significant Results and Procedures   Most Recent 3 CBC's:  Recent Labs   Lab Test 08/15/24  0628 08/14/24  0839 06/25/24  0626   WBC 4.2 5.0 3.0*   HGB 11.9 13.0 13.1   MCV 93 90 97   PLT 74* 125* 120*     Most Recent  3 BMP's:  Recent Labs   Lab Test 08/14/24  0839 06/26/24  0633 06/25/24  0626 06/25/24  0304 06/24/24  1645   *  --  136  --  139   POTASSIUM 4.1 3.9 3.5  --  4.3   CHLORIDE 82*  --  98  --  99   CO2 17*  --  24  --  21*   BUN 14.2  --  9.8  --  9.7   CR 0.53  --  0.44*  --  0.45*  0.45*   ANIONGAP 27*  --  14  --  19*   MAHSA 8.0*  --  8.7  --  9.1   *  --  76 85 71   ,   Results for orders placed or performed during the hospital encounter of 05/29/24   Head CT w/o contrast    Narrative    EXAM: CT HEAD W/O CONTRAST  5/29/2024 8:18 AM     HISTORY:  seizure       COMPARISON:  No prior similar studies    TECHNIQUE: Using multidetector thin collimation helical acquisition  technique, axial, coronal and sagittal CT images from the skull base  to the vertex were obtained without intravenous contrast.   (topogram) image(s) also obtained and reviewed. Dose reduction  techniques were performed.    FINDINGS:  No intracranial hemorrhage, mass effect, or midline shift. No acute  loss of gray-white matter differentiation in the cerebral hemispheres.  Ventricles are proportionate to the cerebral sulci. Clear basal  cisterns.    The bony calvaria and the bones of the skull base are normal. The  visualized portions of the paranasal sinuses and mastoid air cells are  clear. Grossly normal orbits.       Impression    IMPRESSION: No acute intracranial pathology.     KYREE SILVA MD         SYSTEM ID:  [id number]   XR Shoulder Right G/E 3 Views    Narrative    XR SHOULDER RIGHT G/E 3 VIEWS 5/29/2024 8:27 AM     HISTORY: seizure, bruising    COMPARISON: None.         Impression    IMPRESSION: The right glenohumeral and acromioclavicular joints are  negative for fracture or dislocation.    EAMON MORATAYA MD         SYSTEM ID:  [id number]   MR Brain w/o & w Contrast    Narrative    EXAM: MR BRAIN W/O and W CONTRAST  LOCATION: [facility]  DATE: 5/30/2024    INDICATION: Seizures. Rule out  intracranial abnormality. Known alcohol use disorder.  COMPARISON: None.  CONTRAST: 6 mL Gadavist.  TECHNIQUE: Routine multiplanar multisequence head MRI without and with intravenous contrast.    FINDINGS:  Mild motion artifact. Subarachnoid T2 FLAIR hyperintensity is present, favored to be artifactual, possibly susceptibility-related artifact given ocular vitreous nonsuppression. No evidence of acute ischemia. No parenchymal abnormalities are appreciated.   No abnormal diffusion restriction. No abnormal enhancement appreciated on motion-limited assessment. Small area of CSF signal along the medial margin of the right anterior temporal lobe, likely incidental arachnoid cyst. No cleavage, migration, or   differentiation abnormalities are appreciated on limited assessment.    Bone marrow demonstrates greater than typically expected T2 hyperintense signal within the bone marrow with possible mild enhancement. The paranasal sinuses appear relatively well aerated. The visualized tympanic and mastoid cavities are unremarkable.   Cervical spine degenerative changes at the inferior field-of-view.      Impression    IMPRESSION:  1.  Subarachnoid T2 FLAIR hyperintensity is favored to be artifactual.  2.  Greater than typically expected bone marrow T2 hyperintensity is nonspecific but can be seen in the setting of smoking, anemia, or myeloproliferative/marrow replacement processes.  3.  Otherwise, unremarkable MRI of the head.           Follow up/instructions: Can follow-up with her primary care provider as needed.  Sobriety was discussed at length with the patient and encouraged    Pending test results at discharge:      Unresulted Labs Ordered in the Past 30 Days of this Admission       Date and Time Order Name Status Description    8/15/2024  7:34 AM RBC and Platelet Morphology In process     8/15/2024  6:28 AM CBC with platelets and differential In process     8/15/2024  5:00 AM Comprehensive metabolic panel In process               Discharge Orders      Reason for your hospital stay    Alcohol withdrawal     Follow-up and recommended labs and tests     Follow up with primary care provider, Physician No Ref-Primary, as needed     Activity    Your activity upon discharge: activity as tolerated     Discharge Instructions    STAY SOBER OFF OF ALL ALCOHOL !!!     Diet    Follow this diet upon discharge: Orders Placed This Encounter      Regular Diet Adult       Discharge Disposition   Discharged to home  Condition at discharge: Stable      Consultations This Hospital Stay   None    Code Status   Full Code    Time Spent on this Encounter   I, Alverto Moses MD, personally saw the patient today and spent greater than 30 minutes discharging this patient.  Discussed with nursing, social work, case management        This document was created using voice recognition technology.  Please excuse any typographical errors that may have occurred.  Please call with any questions.       Alverto Moses MD  [facility] [facility] [address] [facility]  [address] [place] [address]  Phone: [phone]  Fax: [phone]  ______________________________________________________________________    Physical Exam   Vital Signs: Temp: 98.6  F (37  C) Temp src: Oral BP: 130/76 Pulse: 83   Resp: 16 SpO2: 98 % O2 Device: None (Room air)    Weight: 114 lbs 8 oz    General appearance: Patient is alert and oriented x3, no apparent distress, pleasant and conversing normally, speaking in full sentences, appears stated age  HEENT:  Mucous membranes are moist  RESPIRATORY: Clear to auscultation bilateral, good air movement  CARDIOVASCULAR: Regular rate and rhythm, normal S1/S2, no murmurs  GASTROINTESTINAL: Non-distended, non-tender, soft, bowel sounds present throughout  NEUROLOGIC:  Cranial nerves II-XII intact, without any focal deficits, strength 5/5 throughout, no tremor  EXTREMITIES:  Moves all extremities, no clubbing, cyanosis, nor  edema  :  Toussaint not present         Discharge Medications   Current Discharge Medication List        START taking these medications    Details   folic acid (FOLVITE) 1 MG tablet Take 1 tablet (1 mg) by mouth daily  Qty: 30 tablet, Refills: 0    Associated Diagnoses: Alcohol dependence with withdrawal with complication (H)      thiamine (B-1) 100 MG tablet Take 1 tablet (100 mg) by mouth daily  Qty: 30 tablet, Refills: 0    Associated Diagnoses: Alcohol dependence with withdrawal with complication (H)           CONTINUE these medications which have NOT CHANGED    Details   albuterol (PROAIR HFA/PROVENTIL HFA/VENTOLIN HFA) 108 (90 Base) MCG/ACT inhaler Inhale 2 puffs into the lungs every 6 hours as needed for shortness of breath, wheezing or cough      levothyroxine (SYNTHROID/LEVOTHROID) 75 MCG tablet Take 75 mcg by mouth daily      naltrexone (DEPADE/REVIA) 50 MG tablet Take 50 mg by mouth daily      norethindrone (MICRONOR) 0.35 MG tablet Take 0.35 mg by mouth daily      sertraline (ZOLOFT) 50 MG tablet Take 2 tablets (100 mg) by mouth daily  Qty: 30 tablet, Refills: 6    Associated Diagnoses: Alcohol withdrawal seizure without complication (H)      traZODone (DESYREL) 50 MG tablet Take  mg by mouth nightly as needed           Allergies   Allergies   Allergen Reactions    Bromine      rash    Nickel      rash

## 2024-08-15 NOTE — PLAN OF CARE
"Cared for 1900-0730    Ax1 w/gb, unsteady gait. A&Ox4, intact. CIWA scores 1/0, with slight tremors but improved. Denies N/V/H. On RA, VSS, denies pain. Clear liquid diet, pt requesting to advance diet to regular. Pt stated that she was eating (unsure what she was eating when writer asked) and stating she is tolerating it well. R PIV, infusing LR at 100 mL/hr. L PIV, SL. No PRN given overnight. Mg was replaced in the ED, recheck in the AM. Pending back to rehab facility when medically cleared    Plan: Continue with plan of care  For vital signs and complete assessments, please see documentation under flowsheets.    Madelaine Bennett RN      Goal Outcome Evaluation:      Plan of Care Reviewed With: patient    Overall Patient Progress: no changeOverall Patient Progress: no change    Outcome Evaluation: CIWA scores 0, no acute changes overnight      Problem: Adult Inpatient Plan of Care  Goal: Plan of Care Review  Description: The Plan of Care Review/Shift note should be completed every shift.  The Outcome Evaluation is a brief statement about your assessment that the patient is improving, declining, or no change.  This information will be displayed automatically on your shift  note.  Outcome: Progressing  Flowsheets (Taken 8/15/2024 0213)  Outcome Evaluation: CIWA scores 0, no acute changes overnight  Plan of Care Reviewed With: patient  Overall Patient Progress: no change  Goal: Patient-Specific Goal (Individualized)  Description: You can add care plan individualizations to a care plan. Examples of Individualization might be:  \"Parent requests to be called daily at 9am for status\", \"I have a hard time hearing out of my right ear\", or \"Do not touch me to wake me up as it startles  me\".  Outcome: Progressing  Goal: Absence of Hospital-Acquired Illness or Injury  Outcome: Progressing  Intervention: Identify and Manage Fall Risk  Recent Flowsheet Documentation  Taken 8/15/2024 0004 by Tarun Bennett RN  Safety Promotion/Fall " Prevention: safety round/check completed  Taken 8/14/2024 2039 by Tarun Bennett RN  Safety Promotion/Fall Prevention:   safety round/check completed   room organization consistent   room near nurse's station   patient and family education   nonskid shoes/slippers when out of bed   lighting adjusted   clutter free environment maintained   assistive device/personal items within reach   activity supervised  Goal: Optimal Comfort and Wellbeing  Outcome: Progressing  Goal: Readiness for Transition of Care  Outcome: Progressing  Intervention: Mutually Develop Transition Plan  Recent Flowsheet Documentation  Taken 8/14/2024 2045 by Tarun Bennett RN  Equipment Currently Used at Home: none     Problem: Fall Injury Risk  Goal: Absence of Fall and Fall-Related Injury  Outcome: Progressing  Intervention: Promote Injury-Free Environment  Recent Flowsheet Documentation  Taken 8/15/2024 0004 by Tarun Bennett RN  Safety Promotion/Fall Prevention: safety round/check completed  Taken 8/14/2024 2039 by Tarun Bennett RN  Safety Promotion/Fall Prevention:   safety round/check completed   room organization consistent   room near nurse's station   patient and family education   nonskid shoes/slippers when out of bed   lighting adjusted   clutter free environment maintained   assistive device/personal items within reach   activity supervised     Problem: Alcohol Withdrawal  Goal: Alcohol Withdrawal Symptom Control  Outcome: Progressing  Goal: Optimal Neurologic Function  Outcome: Progressing  Goal: Readiness for Change Identified  Outcome: Progressing     Problem: Comorbidity Management  Goal: Maintenance of Behavioral Health Symptom Control  Outcome: Progressing  Goal: Blood Glucose Levels Within Targeted Range  Outcome: Progressing  Goal: Blood Pressure in Desired Range  Outcome: Progressing  Goal: Maintenance of Seizure Control  Outcome: Progressing

## 2024-08-15 NOTE — PROGRESS NOTES
"PRIMARY DIAGNOSIS: \"GENERIC\" NURSING  OUTPATIENT/OBSERVATION GOALS TO BE MET BEFORE DISCHARGE:  ADLs back to baseline: Yes    Activity and level of assistance: Up with standby assistance.    Pain status: Pain free.    Return to near baseline physical activity: Yes     Discharge Planner Nurse   Safe discharge environment identified: Yes  Barriers to discharge: No       Entered by: Heriberto Robertson RN 08/15/2024 11:32 AM     Please review provider order for any additional goals.   Nurse to notify provider when observation goals have been met and patient is ready for discharge.  "

## 2024-08-17 ENCOUNTER — PATIENT OUTREACH (OUTPATIENT)
Dept: CARE COORDINATION | Facility: CLINIC | Age: 34
End: 2024-08-17
Payer: COMMERCIAL

## 2024-08-17 NOTE — PROGRESS NOTES
Connected Care Resource Center:   [facility] Contact  [facility]/Voicemail     Clinical Data: Post-Discharge Outreach     Outreach attempted x 2.  Left message on patient's voicemail, providing [facility]'s central phone number of [phone] ([phone]) for questions/concerns and/or to schedule an appt with an [facility] provider, if they do not have a PCP.      Plan:  [facility] will do no further outreaches at this time.       Luann Robbins MA  Connected Care Resource Center, [facility]    *Connected Care Resource Team does NOT follow patient ongoing. Referrals are identified based on internal discharge reports and the outreach is to ensure patient has an understanding of their discharge instructions.

## 2024-09-04 ENCOUNTER — HOSPITAL ENCOUNTER (INPATIENT)
Facility: CLINIC | Age: 34
LOS: 2 days | Discharge: HOME | End: 2024-09-06
Attending: EMERGENCY MEDICINE | Admitting: INTERNAL MEDICINE
Payer: COMMERCIAL

## 2024-09-04 DIAGNOSIS — F10.930 ALCOHOL WITHDRAWAL SEIZURE WITHOUT COMPLICATION (H): ICD-10-CM

## 2024-09-04 DIAGNOSIS — R56.9 ALCOHOL WITHDRAWAL SEIZURE WITH PERCEPTUAL DISTURBANCE (H): ICD-10-CM

## 2024-09-04 DIAGNOSIS — F10.932 ALCOHOL WITHDRAWAL SEIZURE WITH PERCEPTUAL DISTURBANCE (H): ICD-10-CM

## 2024-09-04 DIAGNOSIS — R56.9 ALCOHOL WITHDRAWAL SEIZURE WITHOUT COMPLICATION (H): ICD-10-CM

## 2024-09-04 DIAGNOSIS — R25.1 TREMOR: ICD-10-CM

## 2024-09-04 DIAGNOSIS — R74.01 TRANSAMINITIS: ICD-10-CM

## 2024-09-04 DIAGNOSIS — F10.929 ALCOHOLIC INTOXICATION WITH COMPLICATION (H): ICD-10-CM

## 2024-09-04 LAB
ALBUMIN SERPL BCG-MCNC: 4.3 G/DL (ref 3.5–5.2)
ALP SERPL-CCNC: 49 U/L (ref 40–150)
ALT SERPL W P-5'-P-CCNC: 61 U/L (ref 0–50)
ANION GAP SERPL CALCULATED.3IONS-SCNC: 17 MMOL/L (ref 7–15)
AST SERPL W P-5'-P-CCNC: 76 U/L (ref 0–45)
ATRIAL RATE - MUSE: 102 BPM
BASOPHILS # BLD AUTO: 0.1 10E3/UL (ref 0–0.2)
BASOPHILS NFR BLD AUTO: 1 %
BILIRUB SERPL-MCNC: 0.7 MG/DL
BUN SERPL-MCNC: 5.9 MG/DL (ref 6–20)
CALCIUM SERPL-MCNC: 8.1 MG/DL (ref 8.8–10.4)
CHLORIDE SERPL-SCNC: 97 MMOL/L (ref 98–107)
CREAT SERPL-MCNC: 0.64 MG/DL (ref 0.51–0.95)
DIASTOLIC BLOOD PRESSURE - MUSE: NORMAL MMHG
EGFRCR SERPLBLD CKD-EPI 2021: >90 ML/MIN/1.73M2
EOSINOPHIL # BLD AUTO: 0.1 10E3/UL (ref 0–0.7)
EOSINOPHIL NFR BLD AUTO: 1 %
ERYTHROCYTE [DISTWIDTH] IN BLOOD BY AUTOMATED COUNT: 13.8 % (ref 10–15)
ETHANOL SERPL-MCNC: 0.17 G/DL
GLUCOSE SERPL-MCNC: 157 MG/DL (ref 70–99)
HCO3 SERPL-SCNC: 23 MMOL/L (ref 22–29)
HCT VFR BLD AUTO: 41.3 % (ref 35–47)
HGB BLD-MCNC: 13.9 G/DL (ref 11.7–15.7)
IMM GRANULOCYTES # BLD: 0 10E3/UL
IMM GRANULOCYTES NFR BLD: 1 %
INTERPRETATION ECG - MUSE: NORMAL
LIPASE SERPL-CCNC: 123 U/L (ref 13–60)
LYMPHOCYTES # BLD AUTO: 1.3 10E3/UL (ref 0.8–5.3)
LYMPHOCYTES NFR BLD AUTO: 20 %
MAGNESIUM SERPL-MCNC: 1.9 MG/DL (ref 1.7–2.3)
MCH RBC QN AUTO: 32.6 PG (ref 26.5–33)
MCHC RBC AUTO-ENTMCNC: 33.7 G/DL (ref 31.5–36.5)
MCV RBC AUTO: 97 FL (ref 78–100)
MONOCYTES # BLD AUTO: 0.4 10E3/UL (ref 0–1.3)
MONOCYTES NFR BLD AUTO: 6 %
NEUTROPHILS # BLD AUTO: 4.5 10E3/UL (ref 1.6–8.3)
NEUTROPHILS NFR BLD AUTO: 71 %
NRBC # BLD AUTO: 0 10E3/UL
NRBC BLD AUTO-RTO: 0 /100
P AXIS - MUSE: 72 DEGREES
PHOSPHATE SERPL-MCNC: 3 MG/DL (ref 2.5–4.5)
PLATELET # BLD AUTO: 239 10E3/UL (ref 150–450)
POTASSIUM SERPL-SCNC: 3.5 MMOL/L (ref 3.4–5.3)
PR INTERVAL - MUSE: 122 MS
PROT SERPL-MCNC: 7.6 G/DL (ref 6.4–8.3)
QRS DURATION - MUSE: 90 MS
QT - MUSE: 384 MS
QTC - MUSE: 500 MS
R AXIS - MUSE: 59 DEGREES
RBC # BLD AUTO: 4.27 10E6/UL (ref 3.8–5.2)
SODIUM SERPL-SCNC: 137 MMOL/L (ref 135–145)
SYSTOLIC BLOOD PRESSURE - MUSE: NORMAL MMHG
T AXIS - MUSE: 18 DEGREES
T4 FREE SERPL-MCNC: 0.97 NG/DL (ref 0.9–1.7)
TSH SERPL DL<=0.005 MIU/L-ACNC: 5.12 UIU/ML (ref 0.3–4.2)
VENTRICULAR RATE- MUSE: 102 BPM
WBC # BLD AUTO: 6.3 10E3/UL (ref 4–11)

## 2024-09-04 PROCEDURE — 83690 ASSAY OF LIPASE: CPT | Performed by: EMERGENCY MEDICINE

## 2024-09-04 PROCEDURE — 84439 ASSAY OF FREE THYROXINE: CPT | Performed by: PHYSICIAN ASSISTANT

## 2024-09-04 PROCEDURE — 99222 1ST HOSP IP/OBS MODERATE 55: CPT | Performed by: PHYSICIAN ASSISTANT

## 2024-09-04 PROCEDURE — 82077 ASSAY SPEC XCP UR&BREATH IA: CPT | Performed by: EMERGENCY MEDICINE

## 2024-09-04 PROCEDURE — 250N000013 HC RX MED GY IP 250 OP 250 PS 637: Performed by: EMERGENCY MEDICINE

## 2024-09-04 PROCEDURE — 82040 ASSAY OF SERUM ALBUMIN: CPT | Performed by: EMERGENCY MEDICINE

## 2024-09-04 PROCEDURE — 258N000003 HC RX IP 258 OP 636: Performed by: EMERGENCY MEDICINE

## 2024-09-04 PROCEDURE — 36415 COLL VENOUS BLD VENIPUNCTURE: CPT | Performed by: EMERGENCY MEDICINE

## 2024-09-04 PROCEDURE — 96361 HYDRATE IV INFUSION ADD-ON: CPT

## 2024-09-04 PROCEDURE — 85025 COMPLETE CBC W/AUTO DIFF WBC: CPT | Performed by: EMERGENCY MEDICINE

## 2024-09-04 PROCEDURE — 96376 TX/PRO/DX INJ SAME DRUG ADON: CPT

## 2024-09-04 PROCEDURE — 99285 EMERGENCY DEPT VISIT HI MDM: CPT | Mod: 25

## 2024-09-04 PROCEDURE — HZ2ZZZZ DETOXIFICATION SERVICES FOR SUBSTANCE ABUSE TREATMENT: ICD-10-PCS | Performed by: EMERGENCY MEDICINE

## 2024-09-04 PROCEDURE — 96374 THER/PROPH/DIAG INJ IV PUSH: CPT | Mod: 59

## 2024-09-04 PROCEDURE — 84100 ASSAY OF PHOSPHORUS: CPT | Performed by: EMERGENCY MEDICINE

## 2024-09-04 PROCEDURE — 250N000011 HC RX IP 250 OP 636: Mod: JW | Performed by: EMERGENCY MEDICINE

## 2024-09-04 PROCEDURE — 120N000001 HC R&B MED SURG/OB

## 2024-09-04 PROCEDURE — 84443 ASSAY THYROID STIM HORMONE: CPT | Performed by: PHYSICIAN ASSISTANT

## 2024-09-04 PROCEDURE — 93005 ELECTROCARDIOGRAM TRACING: CPT

## 2024-09-04 PROCEDURE — 250N000013 HC RX MED GY IP 250 OP 250 PS 637: Performed by: PHYSICIAN ASSISTANT

## 2024-09-04 PROCEDURE — 258N000003 HC RX IP 258 OP 636: Performed by: PHYSICIAN ASSISTANT

## 2024-09-04 PROCEDURE — 83735 ASSAY OF MAGNESIUM: CPT | Performed by: EMERGENCY MEDICINE

## 2024-09-04 RX ORDER — GABAPENTIN 600 MG/1
1200 TABLET ORAL ONCE
Status: COMPLETED | OUTPATIENT
Start: 2024-09-04 | End: 2024-09-04

## 2024-09-04 RX ORDER — DIAZEPAM 10 MG/2ML
5 INJECTION, SOLUTION INTRAMUSCULAR; INTRAVENOUS ONCE
Status: COMPLETED | OUTPATIENT
Start: 2024-09-04 | End: 2024-09-04

## 2024-09-04 RX ORDER — PANTOPRAZOLE SODIUM 40 MG/1
40 TABLET, DELAYED RELEASE ORAL
Status: DISCONTINUED | OUTPATIENT
Start: 2024-09-04 | End: 2024-09-06 | Stop reason: HOSPADM

## 2024-09-04 RX ORDER — ONDANSETRON 4 MG/1
4 TABLET, ORALLY DISINTEGRATING ORAL EVERY 6 HOURS PRN
Status: DISCONTINUED | OUTPATIENT
Start: 2024-09-04 | End: 2024-09-06 | Stop reason: HOSPADM

## 2024-09-04 RX ORDER — AMOXICILLIN 250 MG
2 CAPSULE ORAL 2 TIMES DAILY PRN
Status: DISCONTINUED | OUTPATIENT
Start: 2024-09-04 | End: 2024-09-06 | Stop reason: HOSPADM

## 2024-09-04 RX ORDER — HALOPERIDOL 5 MG/ML
2.5-5 INJECTION INTRAMUSCULAR EVERY 6 HOURS PRN
Status: DISCONTINUED | OUTPATIENT
Start: 2024-09-04 | End: 2024-09-06 | Stop reason: HOSPADM

## 2024-09-04 RX ORDER — LIDOCAINE 40 MG/G
CREAM TOPICAL
Status: DISCONTINUED | OUTPATIENT
Start: 2024-09-04 | End: 2024-09-06 | Stop reason: HOSPADM

## 2024-09-04 RX ORDER — MULTIVITAMIN,THERAPEUTIC
1 TABLET ORAL DAILY
Status: DISCONTINUED | OUTPATIENT
Start: 2024-09-04 | End: 2024-09-06 | Stop reason: HOSPADM

## 2024-09-04 RX ORDER — GABAPENTIN 300 MG/1
300 CAPSULE ORAL EVERY 8 HOURS
Status: DISCONTINUED | OUTPATIENT
Start: 2024-09-09 | End: 2024-09-06 | Stop reason: HOSPADM

## 2024-09-04 RX ORDER — FOLIC ACID 1 MG/1
1 TABLET ORAL DAILY
Status: DISCONTINUED | OUTPATIENT
Start: 2024-09-04 | End: 2024-09-06 | Stop reason: HOSPADM

## 2024-09-04 RX ORDER — GABAPENTIN 300 MG/1
900 CAPSULE ORAL EVERY 8 HOURS
Status: DISCONTINUED | OUTPATIENT
Start: 2024-09-04 | End: 2024-09-06 | Stop reason: HOSPADM

## 2024-09-04 RX ORDER — GABAPENTIN 300 MG/1
600 CAPSULE ORAL EVERY 8 HOURS
Status: DISCONTINUED | OUTPATIENT
Start: 2024-09-07 | End: 2024-09-06 | Stop reason: HOSPADM

## 2024-09-04 RX ORDER — GABAPENTIN 100 MG/1
100 CAPSULE ORAL EVERY 8 HOURS
Status: DISCONTINUED | OUTPATIENT
Start: 2024-09-11 | End: 2024-09-06 | Stop reason: HOSPADM

## 2024-09-04 RX ORDER — DIAZEPAM 10 MG/2ML
5-10 INJECTION, SOLUTION INTRAMUSCULAR; INTRAVENOUS EVERY 30 MIN PRN
Status: DISCONTINUED | OUTPATIENT
Start: 2024-09-04 | End: 2024-09-06 | Stop reason: HOSPADM

## 2024-09-04 RX ORDER — CLONIDINE HYDROCHLORIDE 0.1 MG/1
0.1 TABLET ORAL EVERY 8 HOURS
Status: DISCONTINUED | OUTPATIENT
Start: 2024-09-04 | End: 2024-09-06 | Stop reason: HOSPADM

## 2024-09-04 RX ORDER — ONDANSETRON 2 MG/ML
4 INJECTION INTRAMUSCULAR; INTRAVENOUS EVERY 6 HOURS PRN
Status: DISCONTINUED | OUTPATIENT
Start: 2024-09-04 | End: 2024-09-06 | Stop reason: HOSPADM

## 2024-09-04 RX ORDER — FLUMAZENIL 0.1 MG/ML
0.2 INJECTION, SOLUTION INTRAVENOUS
Status: DISCONTINUED | OUTPATIENT
Start: 2024-09-04 | End: 2024-09-06 | Stop reason: HOSPADM

## 2024-09-04 RX ORDER — OLANZAPINE 5 MG/1
5-10 TABLET, ORALLY DISINTEGRATING ORAL EVERY 6 HOURS PRN
Status: DISCONTINUED | OUTPATIENT
Start: 2024-09-04 | End: 2024-09-06 | Stop reason: HOSPADM

## 2024-09-04 RX ORDER — AMOXICILLIN 250 MG
1 CAPSULE ORAL 2 TIMES DAILY PRN
Status: DISCONTINUED | OUTPATIENT
Start: 2024-09-04 | End: 2024-09-06 | Stop reason: HOSPADM

## 2024-09-04 RX ORDER — SODIUM CHLORIDE 9 MG/ML
INJECTION, SOLUTION INTRAVENOUS CONTINUOUS
Status: DISCONTINUED | OUTPATIENT
Start: 2024-09-04 | End: 2024-09-06 | Stop reason: HOSPADM

## 2024-09-04 RX ORDER — DIAZEPAM 10 MG
10 TABLET ORAL EVERY 30 MIN PRN
Status: DISCONTINUED | OUTPATIENT
Start: 2024-09-04 | End: 2024-09-06 | Stop reason: HOSPADM

## 2024-09-04 RX ORDER — ACETAMINOPHEN 325 MG/1
650 TABLET ORAL EVERY 8 HOURS PRN
Status: DISCONTINUED | OUTPATIENT
Start: 2024-09-04 | End: 2024-09-06 | Stop reason: HOSPADM

## 2024-09-04 RX ORDER — NORGESTIMATE AND ETHINYL ESTRADIOL 0.25-0.035
1 KIT ORAL DAILY
COMMUNITY

## 2024-09-04 RX ADMIN — GABAPENTIN 900 MG: 300 CAPSULE ORAL at 21:42

## 2024-09-04 RX ADMIN — CLONIDINE HYDROCHLORIDE 0.1 MG: 0.1 TABLET ORAL at 21:42

## 2024-09-04 RX ADMIN — DIAZEPAM 10 MG: 10 TABLET ORAL at 14:06

## 2024-09-04 RX ADMIN — PANTOPRAZOLE SODIUM 40 MG: 40 TABLET, DELAYED RELEASE ORAL at 16:38

## 2024-09-04 RX ADMIN — SODIUM CHLORIDE 1000 ML: 9 INJECTION, SOLUTION INTRAVENOUS at 12:10

## 2024-09-04 RX ADMIN — SODIUM CHLORIDE 1000 ML: 9 INJECTION, SOLUTION INTRAVENOUS at 16:41

## 2024-09-04 RX ADMIN — DIAZEPAM 5 MG: 10 INJECTION, SOLUTION INTRAMUSCULAR; INTRAVENOUS at 12:10

## 2024-09-04 RX ADMIN — GABAPENTIN 1200 MG: 600 TABLET, FILM COATED ORAL at 14:06

## 2024-09-04 RX ADMIN — ACETAMINOPHEN 650 MG: 325 TABLET, FILM COATED ORAL at 17:08

## 2024-09-04 RX ADMIN — DIAZEPAM 10 MG: 10 TABLET ORAL at 21:45

## 2024-09-04 RX ADMIN — THERA TABS 1 TABLET: TAB at 16:38

## 2024-09-04 RX ADMIN — DIAZEPAM 5 MG: 10 INJECTION, SOLUTION INTRAMUSCULAR; INTRAVENOUS at 13:16

## 2024-09-04 RX ADMIN — SODIUM CHLORIDE: 9 INJECTION, SOLUTION INTRAVENOUS at 21:15

## 2024-09-04 RX ADMIN — CLONIDINE HYDROCHLORIDE 0.1 MG: 0.1 TABLET ORAL at 14:06

## 2024-09-04 RX ADMIN — DIAZEPAM 10 MG: 10 TABLET ORAL at 16:55

## 2024-09-04 RX ADMIN — DIAZEPAM 10 MG: 10 TABLET ORAL at 18:18

## 2024-09-04 RX ADMIN — THIAMINE HCL TAB 100 MG 100 MG: 100 TAB at 12:10

## 2024-09-04 RX ADMIN — FOLIC ACID 1 MG: 1 TABLET ORAL at 12:15

## 2024-09-04 ASSESSMENT — ACTIVITIES OF DAILY LIVING (ADL)
ADLS_ACUITY_SCORE: 38
ADLS_ACUITY_SCORE: 38
ADLS_ACUITY_SCORE: 21
ADLS_ACUITY_SCORE: 36
ADLS_ACUITY_SCORE: 21
ADLS_ACUITY_SCORE: 21
ADLS_ACUITY_SCORE: 38
ADLS_ACUITY_SCORE: 21
ADLS_ACUITY_SCORE: 21
ADLS_ACUITY_SCORE: 38

## 2024-09-04 ASSESSMENT — LIFESTYLE VARIABLES
AUDITORY DISTURBANCES: NOT PRESENT
TREMOR: MODERATE, WITH PATIENT'S ARMS EXTENDED
NAUSEA AND VOMITING: MILD NAUSEA WITH NO VOMITING
AGITATION: SOMEWHAT MORE THAN NORMAL ACTIVITY
HEADACHE, FULLNESS IN HEAD: NOT PRESENT
ANXIETY: 3
PAROXYSMAL SWEATS: NO SWEAT VISIBLE
VISUAL DISTURBANCES: NOT PRESENT
ORIENTATION AND CLOUDING OF SENSORIUM: ORIENTED AND CAN DO SERIAL ADDITIONS
TOTAL SCORE: 9

## 2024-09-04 NOTE — ED PROVIDER NOTES
"  Emergency Department Note      History of Present Illness     Chief Complaint   Withdrawal      HPI   Carley Maldonado is a 33 year old female with a history of alcohol dependence and abuse as well as alcohol withdrawal seizures who presents with her brother for withdrawal symptoms.  Last drink was 10:00 last night.  She is very shaky.  She is concerned she could have another seizure.  She denies any cough cold runny nose.  No nausea or vomiting.  She denies diarrhea or urinary problems.  No fevers chills or sweats.  She is having at least a half a liter ROM daily for the past week.    Independent Historian   None    Review of External Notes   I reviewed her discharge summary from her recent admission for alcohol withdrawal.  This was on 15 August.    Past Medical History     Medical History and Problem List   Past Medical History:   Diagnosis Date     Alcohol abuse      Alcohol withdrawal (H)        Medications   albuterol (PROAIR HFA/PROVENTIL HFA/VENTOLIN HFA) 108 (90 Base) MCG/ACT inhaler  folic acid (FOLVITE) 1 MG tablet  gabapentin (NEURONTIN) 100 MG capsule  levothyroxine (SYNTHROID/LEVOTHROID) 75 MCG tablet  naltrexone (DEPADE/REVIA) 50 MG tablet  norethindrone (MICRONOR) 0.35 MG tablet  sertraline (ZOLOFT) 50 MG tablet  thiamine (B-1) 100 MG tablet  traZODone (DESYREL) 50 MG tablet        Surgical History   Past Surgical History:   Procedure Laterality Date     APPENDECTOMY OPEN      in 2nd grade     wisdom teeth  2009       Physical Exam     Patient Vitals for the past 24 hrs:   BP Temp Temp src Pulse Resp SpO2 Height Weight   09/04/24 1136 119/83 98.9  F (37.2  C) Temporal 116 20 100 % 1.6 m (5' 3\") 50.9 kg (112 lb 3.4 oz)     Physical Exam  Constitutional: Vital signs reviewed as above  General: Anxious, tremulous  HEENT: Dry mucous membranes  Eyes: Conjunctiva normal.   Neck: Normal range of motion  Cardiovascular: Tachycardic rate, Regular rhythm and normal heart sounds.  No MRG  Pulmonary/Chest: " Effort normal and breath sounds normal. No respiratory distress. Patient has no wheezes. Patient has no rales.   Abdominal: Soft. Positive bowel sounds. No MRG.  Musculoskeletal/Extremities: Full ROM.  Endo: No pitting edema  Neurological: Alert, no focal deficits.  Tremors throughout all 4 extremities.  Skin: Skin is warm and dry.   Psychiatric: Uncomfortable appearing      Diagnostics     Lab Results   Labs Ordered and Resulted from Time of ED Arrival to Time of ED Departure   COMPREHENSIVE METABOLIC PANEL - Abnormal       Result Value    Sodium 137      Potassium 3.5      Carbon Dioxide (CO2) 23      Anion Gap 17 (*)     Urea Nitrogen 5.9 (*)     Creatinine 0.64      GFR Estimate >90      Calcium 8.1 (*)     Chloride 97 (*)     Glucose 157 (*)     Alkaline Phosphatase 49      AST 76 (*)     ALT 61 (*)     Protein Total 7.6      Albumin 4.3      Bilirubin Total 0.7     LIPASE - Abnormal    Lipase 123 (*)    ETHYL ALCOHOL LEVEL - Abnormal    Alcohol ethyl 0.17 (*)    CBC WITH PLATELETS AND DIFFERENTIAL    WBC Count 6.3      RBC Count 4.27      Hemoglobin 13.9      Hematocrit 41.3      MCV 97      MCH 32.6      MCHC 33.7      RDW 13.8      Platelet Count 239      % Neutrophils 71      % Lymphocytes 20      % Monocytes 6      % Eosinophils 1      % Basophils 1      % Immature Granulocytes 1      NRBCs per 100 WBC 0      Absolute Neutrophils 4.5      Absolute Lymphocytes 1.3      Absolute Monocytes 0.4      Absolute Eosinophils 0.1      Absolute Basophils 0.1      Absolute Immature Granulocytes 0.0      Absolute NRBCs 0.0     MAGNESIUM   PHOSPHORUS       Imaging   No orders to display       EKG   ECG results from 09/04/24   EKG 12-lead, tracing only     Value    Systolic Blood Pressure     Diastolic Blood Pressure     Ventricular Rate 102    Atrial Rate 102    MI Interval 122    QRS Duration 90        QTc 500    P Axis 72    R AXIS 59    T Axis 18    Interpretation ECG      Sinus tachycardia  Otherwise normal  ECG  When compared with ECG of 14-Aug-2024 08:47,  ME interval has increased  T wave inversion less evident in Inferior leads           Independent Interpretation   EKG from today shows sinus tachycardia, rate of 102, no acute concerning ST or T wave changes    ED Course      Medications Administered   Medications   folic acid (FOLVITE) tablet 1 mg (1 mg Oral $Given 9/4/24 1215)   cloNIDine (CATAPRES) tablet 0.1 mg (has no administration in time range)   OLANZapine zydis (zyPREXA) ODT tab 5-10 mg (has no administration in time range)     Or   haloperidol lactate (HALDOL) injection 2.5-5 mg (has no administration in time range)   flumazenil (ROMAZICON) injection 0.2 mg (has no administration in time range)   melatonin tablet 5 mg (has no administration in time range)   gabapentin (NEURONTIN) tablet 1,200 mg (has no administration in time range)   gabapentin (NEURONTIN) capsule 900 mg (has no administration in time range)   gabapentin (NEURONTIN) capsule 600 mg (has no administration in time range)   gabapentin (NEURONTIN) capsule 300 mg (has no administration in time range)   gabapentin (NEURONTIN) capsule 100 mg (has no administration in time range)   diazepam (VALIUM) tablet 10 mg (has no administration in time range)     Or   diazepam (VALIUM) injection 5-10 mg (has no administration in time range)   sodium chloride 0.9% BOLUS 1,000 mL (1,000 mLs Intravenous $New Bag 9/4/24 1210)   thiamine (B-1) tablet 100 mg (100 mg Oral $Given 9/4/24 1210)   diazepam (VALIUM) injection 5 mg (5 mg Intravenous $Given 9/4/24 1210)   diazepam (VALIUM) injection 5 mg (5 mg Intravenous $Given 9/4/24 1316)       Procedures   Procedures     Discussion of Management   Admitting Hospitalist, I spoke with Anjelica who will be admitting the patient for Dr. Glynn    ED Course        Additional Documentation  Alcohol abuse    Medical Decision Making / Diagnosis     CMS Diagnoses: None    MIPS       None    LEEROY Maldonado is a 33 year  old female with a history of alcohol abuse and intoxication who presents in withdrawal symptoms.  Last drink was last night.  Her blood alcohol level here is 0.17.  She is extremely shaky and anxious.  She received a couple doses of Valium and is still very shaky.  She is also had some fluids.  She has had withdrawal seizures in the past and is concerned for the same.  She was given folate and thiamine here as well as the fluids.  She does have a slight bump in her lipase to 123 but her abdominal exam here is relatively benign.  She has transaminitis and some other mild electrolyte abnormalities.  Glucose is also elevated at 157.  CBC is unremarkable.  On reevaluation she still very tremulous and uncomfortable.  I have put her on the [facility] protocol.  She was brought to the hospital service for continued treatment.    Disposition   The patient was admitted to the hospital.     Diagnosis     ICD-10-CM    1. Alcoholic intoxication with complication (H24)  F10.929       2. Tremor  R25.1       3. Transaminitis  R74.01       4. Alcohol withdrawal seizure with perceptual disturbance (H)  F10.932     R56.9            Discharge Medications   New Prescriptions    No medications on file       MD Elliott King Brent Aaron, MD  09/04/24 [id number]

## 2024-09-04 NOTE — ED TRIAGE NOTES
Pt arrives with c/o alcohol withdrawal. HX: withdrawal,seizure. Has been drinking a 1/2 liter of Rum daily for the last week. Last drink last night at 2200. Pt reports tremors. Appears anxious. Denies nausea/vomiting/headache/hallucinations. Does not want to go to detox today.      Triage Assessment (Adult)       Row Name 09/04/24 1137          Triage Assessment    Airway WDL WDL        Respiratory WDL    Respiratory WDL WDL        Skin Circulation/Temperature WDL    Skin Circulation/Temperature WDL WDL        Cardiac WDL    Cardiac WDL WDL        Peripheral/Neurovascular WDL    Peripheral Neurovascular WDL WDL        Cognitive/Neuro/Behavioral WDL    Cognitive/Neuro/Behavioral WDL X;mood/behavior     Level of Consciousness alert     Orientation oriented x 4     Speech clear;spontaneous;logical     Mood/Behavior anxious        [place] Coma Scale    Best Eye Response 4-->(E4) spontaneous     Best Motor Response 6-->(M6) obeys commands     Best Verbal Response 5-->(V5) oriented     [place] Coma Scale Score 15

## 2024-09-04 NOTE — PLAN OF CARE
"Goal Outcome Evaluation:    Problem: Adult Inpatient Plan of Care  Goal: Plan of Care Review  Description: The Plan of Care Review/Shift note should be completed every shift.  The Outcome Evaluation is a brief statement about your assessment that the patient is improving, declining, or no change.  This information will be displayed automatically on your shift  note.  Outcome: Progressing  Flowsheets (Taken 9/4/2024 1823)  Outcome Evaluation: admit  Plan of Care Reviewed With: patient  Overall Patient Progress: no change  Goal: Patient-Specific Goal (Individualized)  Description: You can add care plan individualizations to a care plan. Examples of Individualization might be:  \"Parent requests to be called daily at 9am for status\", \"I have a hard time hearing out of my right ear\", or \"Do not touch me to wake me up as it startles  me\".  Outcome: Progressing  Goal: Absence of Hospital-Acquired Illness or Injury  Outcome: Progressing  Intervention: Identify and Manage Fall Risk  Recent Flowsheet Documentation  Taken 9/4/2024 1621 by Maxine Álvarez RN  Safety Promotion/Fall Prevention: activity supervised  Intervention: Prevent and Manage VTE (Venous Thromboembolism) Risk  Recent Flowsheet Documentation  Taken 9/4/2024 1621 by Maxine Álvarez RN  VTE Prevention/Management: SCDs on (sequential compression devices)  Intervention: Prevent Infection  Recent Flowsheet Documentation  Taken 9/4/2024 1621 by Maxine Álvarez RN  Infection Prevention: hand hygiene promoted  Goal: Optimal Comfort and Wellbeing  Outcome: Progressing  Goal: Readiness for Transition of Care  Outcome: Progressing  Flowsheets (Taken 9/4/2024 1600)  Transportation Anticipated: car, drives self  Intervention: Mutually Develop Transition Plan  Recent Flowsheet Documentation  Taken 9/4/2024 1600 by Maxine Álvarez RN  Transportation Anticipated: car, drives self  Patient/Family Anticipated Services at Transition: none  Patient/Family Anticipates Transition to: " home with family  Equipment Currently Used at Home: none       Plan of Care Reviewed With: patient    Overall Patient Progress: no changeOverall Patient Progress: no change    Outcome Evaluation: admit

## 2024-09-04 NOTE — LETTER
[place] [address] [facility]  [address]  [facility] [address]  Phone: [phone]  Fax: [phone]    September 6, 2024        Carley Maldonado  [address]   [facility] [address]      To whom it may concern:    RE: Carley Maldonado    Patient was admitted to [facility] from 9/4/2024 to 9/6/2024.  Patient may return to work with no restrictions.     Please contact me for questions or concerns.      Sincerely,      Clark Glynn,*
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
## 2024-09-04 NOTE — ED NOTES
"[facility]  ED Nurse Handoff Report  RECEIVING UNIT ED HANDOFF REVIEW    Above ED Nurse Handoff Report was reviewed: Yes  Reviewed by: Maxine Álvarez, RN on September 4, 2024 at 3:33 PM   THALIA Edmondson called the ED to inform them the note was read: Yes     ED Chief complaint: Withdrawal  . ED Diagnosis:   Final diagnoses:   Alcoholic intoxication with complication (H24)   Tremor   Transaminitis   Alcohol withdrawal seizure with perceptual disturbance (H)       Allergies:   Allergies   Allergen Reactions    Bromine      rash    Nickel      rash       Code Status: Full Code    Activity level - Baseline/Home:  independent.  Activity Level - Current:   independent.   Lift room needed: No.   Bariatric: No   Needed: No   Isolation: No.   Infection: Not Applicable.     Respiratory status: Room air    Vital Signs (within 30 minutes):   Vitals:    09/04/24 1136   BP: 119/83   Pulse: 116   Resp: 20   Temp: 98.9  F (37.2  C)   TempSrc: Temporal   SpO2: 100%   Weight: 50.9 kg (112 lb 3.4 oz)   Height: 1.6 m (5' 3\")       Cardiac Rhythm:  ,      Pain level:    Patient confused: No.   Patient Falls Risk: patient and family education.   Elimination Status: Has voided     Patient Report - Initial Complaint: alcohol withdrawal.   Focused Assessment: Carley Maldonado is a 33 year old female with a history of alcohol dependence and abuse as well as alcohol withdrawal seizures who presents with her brother for withdrawal symptoms.  Last drink was 10:00 last night.  She is very shaky.  She is concerned she could have another seizure.  She denies any cough cold runny nose.  No nausea or vomiting.  She denies diarrhea or urinary problems.  No fevers chills or sweats.  She is having at least a half a liter ROM daily for the past week.       Abnormal Results:   Labs Ordered and Resulted from Time of ED Arrival to Time of ED Departure   COMPREHENSIVE METABOLIC PANEL - Abnormal       Result Value    Sodium 137      " Potassium 3.5      Carbon Dioxide (CO2) 23      Anion Gap 17 (*)     Urea Nitrogen 5.9 (*)     Creatinine 0.64      GFR Estimate >90      Calcium 8.1 (*)     Chloride 97 (*)     Glucose 157 (*)     Alkaline Phosphatase 49      AST 76 (*)     ALT 61 (*)     Protein Total 7.6      Albumin 4.3      Bilirubin Total 0.7     LIPASE - Abnormal    Lipase 123 (*)    ETHYL ALCOHOL LEVEL - Abnormal    Alcohol ethyl 0.17 (*)    CBC WITH PLATELETS AND DIFFERENTIAL    WBC Count 6.3      RBC Count 4.27      Hemoglobin 13.9      Hematocrit 41.3      MCV 97      MCH 32.6      MCHC 33.7      RDW 13.8      Platelet Count 239      % Neutrophils 71      % Lymphocytes 20      % Monocytes 6      % Eosinophils 1      % Basophils 1      % Immature Granulocytes 1      NRBCs per 100 WBC 0      Absolute Neutrophils 4.5      Absolute Lymphocytes 1.3      Absolute Monocytes 0.4      Absolute Eosinophils 0.1      Absolute Basophils 0.1      Absolute Immature Granulocytes 0.0      Absolute NRBCs 0.0     MAGNESIUM   PHOSPHORUS        No orders to display       Treatments provided: see MAR  Family Comments: N/A  OBS brochure/video discussed/provided to patient:  N/A  ED Medications:   Medications   folic acid (FOLVITE) tablet 1 mg (1 mg Oral $Given 9/4/24 1215)   cloNIDine (CATAPRES) tablet 0.1 mg (has no administration in time range)   OLANZapine zydis (zyPREXA) ODT tab 5-10 mg (has no administration in time range)     Or   haloperidol lactate (HALDOL) injection 2.5-5 mg (has no administration in time range)   flumazenil (ROMAZICON) injection 0.2 mg (has no administration in time range)   melatonin tablet 5 mg (has no administration in time range)   gabapentin (NEURONTIN) tablet 1,200 mg (has no administration in time range)   gabapentin (NEURONTIN) capsule 900 mg (has no administration in time range)   gabapentin (NEURONTIN) capsule 600 mg (has no administration in time range)   gabapentin (NEURONTIN) capsule 300 mg (has no administration in time  range)   gabapentin (NEURONTIN) capsule 100 mg (has no administration in time range)   diazepam (VALIUM) tablet 10 mg (has no administration in time range)     Or   diazepam (VALIUM) injection 5-10 mg (has no administration in time range)   sodium chloride 0.9% BOLUS 1,000 mL (1,000 mLs Intravenous $New Bag 9/4/24 1210)   thiamine (B-1) tablet 100 mg (100 mg Oral $Given 9/4/24 1210)   diazepam (VALIUM) injection 5 mg (5 mg Intravenous $Given 9/4/24 1210)   diazepam (VALIUM) injection 5 mg (5 mg Intravenous $Given 9/4/24 1316)       Drips infusing:  Yes  For the majority of the shift this patient was Green.   Interventions performed were N/A.    Sepsis treatment initiated: No    Cares/treatment/interventions/medications to be completed following ED care: see inpatient admit orders.     ED Nurse Name: Ingrid Jaeger RN  1:51 PM

## 2024-09-04 NOTE — PLAN OF CARE
"  Problem: Adult Inpatient Plan of CareTo Do:  End of Shift Summary  For vital signs and complete assessments, please see documentation flowsheets.     Pertinent assessments:  A&o--- up in room with assist 1--- CIWA  9 X2 Valium given  tremors and anxiety ---appetite fair   Major Shift Events CIWA  scales   Treatment Plan:  monitor  Bedside Nurse: Maxine Álvarez RN       Goal: Plan of Care Review  Description: The Plan of Care Review/Shift note should be completed every shift.  The Outcome Evaluation is a brief statement about your assessment that the patient is improving, declining, or no change.  This information will be displayed automatically on your shift  note.  Outcome: Progressing  Flowsheets (Taken 9/4/2024 1823)  Outcome Evaluation: admit  Plan of Care Reviewed With: patient  Overall Patient Progress: no change  Goal: Patient-Specific Goal (Individualized)  Description: You can add care plan individualizations to a care plan. Examples of Individualization might be:  \"Parent requests to be called daily at 9am for status\", \"I have a hard time hearing out of my right ear\", or \"Do not touch me to wake me up as it startles  me\".  Outcome: Progressing  Goal: Absence of Hospital-Acquired Illness or Injury  Outcome: Progressing  Intervention: Identify and Manage Fall Risk  Recent Flowsheet Documentation  Taken 9/4/2024 1621 by Maxine Álvarez RN  Safety Promotion/Fall Prevention: activity supervised  Intervention: Prevent and Manage VTE (Venous Thromboembolism) Risk  Recent Flowsheet Documentation  Taken 9/4/2024 1621 by Maxine Álvarez RN  VTE Prevention/Management: SCDs on (sequential compression devices)  Intervention: Prevent Infection  Recent Flowsheet Documentation  Taken 9/4/2024 1621 by Maxine Álvarez RN  Infection Prevention: hand hygiene promoted  Goal: Optimal Comfort and Wellbeing  Outcome: Progressing  Goal: Readiness for Transition of Care  Outcome: Progressing  Flowsheets (Taken 9/4/2024 " 1600)  Transportation Anticipated: car, drives self  Intervention: Mutually Develop Transition Plan  Recent Flowsheet Documentation  Taken 9/4/2024 1600 by Maxine Álvarez RN  Transportation Anticipated: car, drives self  Patient/Family Anticipated Services at Transition: none  Patient/Family Anticipates Transition to: home with family  Equipment Currently Used at Home: none   Goal Outcome Evaluation:      Plan of Care Reviewed With: patient    Overall Patient Progress: no changeOverall Patient Progress: no change    Outcome Evaluation: admit

## 2024-09-05 LAB
ALBUMIN SERPL BCG-MCNC: 3.6 G/DL (ref 3.5–5.2)
ALP SERPL-CCNC: 39 U/L (ref 40–150)
ALT SERPL W P-5'-P-CCNC: 41 U/L (ref 0–50)
ANION GAP SERPL CALCULATED.3IONS-SCNC: 11 MMOL/L (ref 7–15)
AST SERPL W P-5'-P-CCNC: 41 U/L (ref 0–45)
BILIRUB SERPL-MCNC: 1.2 MG/DL
BUN SERPL-MCNC: 7.7 MG/DL (ref 6–20)
CALCIUM SERPL-MCNC: 8.3 MG/DL (ref 8.8–10.4)
CHLORIDE SERPL-SCNC: 103 MMOL/L (ref 98–107)
CREAT SERPL-MCNC: 0.59 MG/DL (ref 0.51–0.95)
EGFRCR SERPLBLD CKD-EPI 2021: >90 ML/MIN/1.73M2
ERYTHROCYTE [DISTWIDTH] IN BLOOD BY AUTOMATED COUNT: 13.2 % (ref 10–15)
GLUCOSE SERPL-MCNC: 81 MG/DL (ref 70–99)
HCO3 SERPL-SCNC: 23 MMOL/L (ref 22–29)
HCT VFR BLD AUTO: 35.4 % (ref 35–47)
HGB BLD-MCNC: 11.4 G/DL (ref 11.7–15.7)
HOLD SPECIMEN: NORMAL
LIPASE SERPL-CCNC: 160 U/L (ref 13–60)
MAGNESIUM SERPL-MCNC: 1.9 MG/DL (ref 1.7–2.3)
MCH RBC QN AUTO: 31.9 PG (ref 26.5–33)
MCHC RBC AUTO-ENTMCNC: 32.2 G/DL (ref 31.5–36.5)
MCV RBC AUTO: 99 FL (ref 78–100)
PHOSPHATE SERPL-MCNC: 3.6 MG/DL (ref 2.5–4.5)
PLATELET # BLD AUTO: 148 10E3/UL (ref 150–450)
POTASSIUM SERPL-SCNC: 3.4 MMOL/L (ref 3.4–5.3)
POTASSIUM SERPL-SCNC: 3.4 MMOL/L (ref 3.4–5.3)
POTASSIUM SERPL-SCNC: 4 MMOL/L (ref 3.4–5.3)
PROT SERPL-MCNC: 5.8 G/DL (ref 6.4–8.3)
RBC # BLD AUTO: 3.57 10E6/UL (ref 3.8–5.2)
SODIUM SERPL-SCNC: 137 MMOL/L (ref 135–145)
WBC # BLD AUTO: 3.8 10E3/UL (ref 4–11)

## 2024-09-05 PROCEDURE — 250N000013 HC RX MED GY IP 250 OP 250 PS 637: Performed by: INTERNAL MEDICINE

## 2024-09-05 PROCEDURE — 99232 SBSQ HOSP IP/OBS MODERATE 35: CPT | Performed by: INTERNAL MEDICINE

## 2024-09-05 PROCEDURE — 36415 COLL VENOUS BLD VENIPUNCTURE: CPT | Performed by: INTERNAL MEDICINE

## 2024-09-05 PROCEDURE — 258N000003 HC RX IP 258 OP 636: Performed by: PHYSICIAN ASSISTANT

## 2024-09-05 PROCEDURE — 120N000001 HC R&B MED SURG/OB

## 2024-09-05 PROCEDURE — 250N000013 HC RX MED GY IP 250 OP 250 PS 637: Performed by: PHYSICIAN ASSISTANT

## 2024-09-05 PROCEDURE — 250N000013 HC RX MED GY IP 250 OP 250 PS 637: Performed by: EMERGENCY MEDICINE

## 2024-09-05 PROCEDURE — 83690 ASSAY OF LIPASE: CPT | Performed by: PHYSICIAN ASSISTANT

## 2024-09-05 PROCEDURE — 85027 COMPLETE CBC AUTOMATED: CPT | Performed by: PHYSICIAN ASSISTANT

## 2024-09-05 PROCEDURE — 36415 COLL VENOUS BLD VENIPUNCTURE: CPT | Performed by: PHYSICIAN ASSISTANT

## 2024-09-05 PROCEDURE — 84100 ASSAY OF PHOSPHORUS: CPT | Performed by: INTERNAL MEDICINE

## 2024-09-05 PROCEDURE — 80053 COMPREHEN METABOLIC PANEL: CPT | Performed by: PHYSICIAN ASSISTANT

## 2024-09-05 PROCEDURE — 99254 IP/OBS CNSLTJ NEW/EST MOD 60: CPT | Mod: 95 | Performed by: REGISTERED NURSE

## 2024-09-05 PROCEDURE — 84132 ASSAY OF SERUM POTASSIUM: CPT | Performed by: INTERNAL MEDICINE

## 2024-09-05 PROCEDURE — 83735 ASSAY OF MAGNESIUM: CPT | Performed by: INTERNAL MEDICINE

## 2024-09-05 RX ORDER — POTASSIUM CHLORIDE 1500 MG/1
40 TABLET, EXTENDED RELEASE ORAL ONCE
Status: COMPLETED | OUTPATIENT
Start: 2024-09-05 | End: 2024-09-05

## 2024-09-05 RX ADMIN — CLONIDINE HYDROCHLORIDE 0.1 MG: 0.1 TABLET ORAL at 16:37

## 2024-09-05 RX ADMIN — THERA TABS 1 TABLET: TAB at 08:39

## 2024-09-05 RX ADMIN — PANTOPRAZOLE SODIUM 40 MG: 40 TABLET, DELAYED RELEASE ORAL at 08:40

## 2024-09-05 RX ADMIN — FOLIC ACID 1 MG: 1 TABLET ORAL at 08:40

## 2024-09-05 RX ADMIN — GABAPENTIN 900 MG: 300 CAPSULE ORAL at 16:39

## 2024-09-05 RX ADMIN — GABAPENTIN 900 MG: 300 CAPSULE ORAL at 05:18

## 2024-09-05 RX ADMIN — THIAMINE HCL TAB 100 MG 100 MG: 100 TAB at 08:41

## 2024-09-05 RX ADMIN — POTASSIUM CHLORIDE 40 MEQ: 1500 TABLET, EXTENDED RELEASE ORAL at 08:41

## 2024-09-05 RX ADMIN — SODIUM CHLORIDE: 9 INJECTION, SOLUTION INTRAVENOUS at 20:29

## 2024-09-05 RX ADMIN — CLONIDINE HYDROCHLORIDE 0.1 MG: 0.1 TABLET ORAL at 05:18

## 2024-09-05 RX ADMIN — CLONIDINE HYDROCHLORIDE 0.1 MG: 0.1 TABLET ORAL at 21:43

## 2024-09-05 RX ADMIN — GABAPENTIN 900 MG: 300 CAPSULE ORAL at 21:43

## 2024-09-05 RX ADMIN — SODIUM CHLORIDE: 9 INJECTION, SOLUTION INTRAVENOUS at 08:43

## 2024-09-05 ASSESSMENT — ACTIVITIES OF DAILY LIVING (ADL)
ADLS_ACUITY_SCORE: 21
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
## 2024-09-05 NOTE — CONSULTS
Care Management Follow Up    Length of Stay (days): 1    Expected Discharge Date: 09/06/2024     Concerns to be Addressed:       Patient plan of care discussed at interdisciplinary rounds: Yes    Anticipated Discharge Disposition:  home?    Discussed  Partnership in Safe Discharge Planning  document with patient/family: No     Handoff Completed: No, handoff not indicated or clinically appropriate    Additional Information:  SW was consulted due to an elevated unplanned readmission risk score of 24%. SW complete chart review & noted CD's note & consult pending for a psych evaluation. SW attempted to meet with patient to complete the initial assessment. She initially agreed to answering questions but promptly fell asleep. Patient remained asleep through SW attempting to initiate assessment.     Next Steps: SW will continue to monitor & re-assess if appropriate.     HERIBERTO Nixon

## 2024-09-05 NOTE — PHARMACY-ADMISSION MEDICATION HISTORY
Pharmacist Admission Medication History    Admission medication history is complete. The information provided in this note is only as accurate as the sources available at the time of the update.    Information Source(s): Patient and CareEverywhere/SureScripts via in-person    Pertinent Information: some compliance issues    Changes made to PTA medication list:  Added: multivitamin  Deleted: None  Changed: OC to Ortho-cyclen 0.25-.035    Allergies reviewed with patient and updates made in EHR: yes    Medication History Completed By: Kermit Severson, [facility] 9/4/2024 7:03 PM    PTA Med List   Medication Sig Last Dose    albuterol (PROAIR HFA/PROVENTIL HFA/VENTOLIN HFA) 108 (90 Base) MCG/ACT inhaler Inhale 2 puffs into the lungs every 6 hours as needed for shortness of breath, wheezing or cough  at PRN    folic acid (FOLVITE) 1 MG tablet Take 1 tablet (1 mg) by mouth daily 9/3/2024 at am    gabapentin (NEURONTIN) 100 MG capsule Take 1 capsule (100 mg) by mouth 3 times daily for 30 days 9/3/2024 at pm    levothyroxine (SYNTHROID/LEVOTHROID) 75 MCG tablet Take 75 mcg by mouth daily 9/3/2024 at am    Multiple Vitamin (MULTIVITAMIN ADULT PO) Take by mouth daily. 9/3/2024    naltrexone (DEPADE/REVIA) 50 MG tablet Take 50 mg by mouth daily 9/3/2024 at am    norgestimate-ethinyl estradiol (ORTHO-CYCLEN) 0.25-35 MG-MCG tablet Take 1 tablet by mouth daily. 9/3/2024    sertraline (ZOLOFT) 50 MG tablet Take 2 tablets (100 mg) by mouth daily More than a month    thiamine (B-1) 100 MG tablet Take 1 tablet (100 mg) by mouth daily 9/3/2024 at am

## 2024-09-05 NOTE — CONSULTS
9/5/2024  I spoke with pt. She stated she is currently attending IOP CHRISSY treatment at [facility] and she plans to continue to attend that program. She did not need any other CHRISSY services.     [facility]: [address] [place] [id number]  Phone: [phone]   Fax: [phone]    Email: [email or web address]  [email or web address]/     Leah Valerio MA [facility]  CHRISSY Evaluation Counselor  [phone]  [email or web address]

## 2024-09-05 NOTE — PROGRESS NOTES
09/05/24 1507   Child Life   Location [facility] adult med/surg area   Interaction Intent Initial Assessment   Method in-person   Individuals Present Patient   Comments (names or other info) Pt was sleeping at time of attempted visit and CCLS was unable to converse with pt and assess needs, if any, as staff reports pt has young children and complex family history with them during rounds.  CCLS will attempt to reconnect at another time.   Time Spent   Direct Patient Care 0   Indirect Patient Care 5   Total Time Spent (Calc) 5
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
## 2024-09-05 NOTE — PLAN OF CARE
"  Problem: Adult Inpatient Plan of CareTo Do:To Do:  End of Shift Summary  For vital signs and complete assessments, please see documentation flowsheets.     Pertinent assessments:  A&o--- up in room---  up to bathroom  per self   steady on feet ----slept most of day  but easily arousable -----CIWA 1-3---- appetite good ---  Major Shift Events  more alert   Treatment Plan: monitor  Bedside Nurse: Maxine Álvarez RN     End of Shift Summary  For vital signs and complete assessments, please see documentation flows      Goal: Plan of Care Review  Description: The Plan of Care Review/Shift note should be completed every shift.  The Outcome Evaluation is a brief statement about your assessment that the patient is improving, declining, or no change.  This information will be displayed automatically on your shift  note.  Outcome: Progressing  Flowsheets (Taken 9/5/2024 1719)  Outcome Evaluation: more alert  Plan of Care Reviewed With: patient  Overall Patient Progress: improving  Goal: Patient-Specific Goal (Individualized)  Description: You can add care plan individualizations to a care plan. Examples of Individualization might be:  \"Parent requests to be called daily at 9am for status\", \"I have a hard time hearing out of my right ear\", or \"Do not touch me to wake me up as it startles  me\".  Outcome: Progressing  Goal: Absence of Hospital-Acquired Illness or Injury  Outcome: Progressing  Intervention: Identify and Manage Fall Risk  Recent Flowsheet Documentation  Taken 9/5/2024 0800 by Maxine Álvarez RN  Safety Promotion/Fall Prevention: activity supervised  Intervention: Prevent Skin Injury  Recent Flowsheet Documentation  Taken 9/5/2024 0800 by Maxine Álvarez RN  Body Position: position changed independently  Goal: Optimal Comfort and Wellbeing  Outcome: Progressing  Goal: Readiness for Transition of Care  Outcome: Progressing     Problem: Pain Acute  Goal: Optimal Pain Control and Function  Outcome: " Progressing  Intervention: Prevent or Manage Pain  Recent Flowsheet Documentation  Taken 9/5/2024 0800 by Maxine Álvarez RN  Medication Review/Management: medications reviewed     Problem: Comorbidity Management  Goal: Maintenance of Asthma Control  Outcome: Progressing  Intervention: Maintain Asthma Symptom Control  Recent Flowsheet Documentation  Taken 9/5/2024 0800 by Maxine Álvarez RN  Medication Review/Management: medications reviewed  Goal: Maintenance of Behavioral Health Symptom Control  Outcome: Progressing  Intervention: Maintain Behavioral Health Symptom Control  Recent Flowsheet Documentation  Taken 9/5/2024 0800 by Maxine Álvarez RN  Medication Review/Management: medications reviewed  Goal: Maintenance of COPD Symptom Control  Outcome: Progressing  Intervention: Maintain COPD Symptom Control  Recent Flowsheet Documentation  Taken 9/5/2024 0800 by Maxine Álvarez RN  Medication Review/Management: medications reviewed  Goal: Blood Glucose Levels Within Targeted Range  Outcome: Progressing  Intervention: Monitor and Manage Glycemia  Recent Flowsheet Documentation  Taken 9/5/2024 0800 by Maxine Álvarez RN  Medication Review/Management: medications reviewed  Goal: Maintenance of Heart Failure Symptom Control  Outcome: Progressing  Intervention: Maintain Heart Failure Management  Recent Flowsheet Documentation  Taken 9/5/2024 0800 by Maxine Álvarez RN  Medication Review/Management: medications reviewed  Goal: Blood Pressure in Desired Range  Outcome: Progressing  Intervention: Maintain Blood Pressure Management  Recent Flowsheet Documentation  Taken 9/5/2024 0800 by Maxine Álvarez RN  Medication Review/Management: medications reviewed  Goal: Maintenance of Osteoarthritis Symptom Control  Outcome: Progressing  Intervention: Maintain Osteoarthritis Symptom Control  Recent Flowsheet Documentation  Taken 9/5/2024 0800 by Maxine Álvarez RN  Activity Management: activity adjusted per tolerance  Medication  Review/Management: medications reviewed  Goal: Bariatric Home Regimen Maintained  Outcome: Progressing  Intervention: Maintain and Manage Postbariatric Surgery Care  Recent Flowsheet Documentation  Taken 9/5/2024 0800 by Maxine Álvarez RN  Medication Review/Management: medications reviewed  Goal: Maintenance of Seizure Control  Outcome: Progressing  Intervention: Maintain Seizure Symptom Control  Recent Flowsheet Documentation  Taken 9/5/2024 0800 by Maxine Álvarez RN  Medication Review/Management: medications reviewed     Problem: Fall Injury Risk  Goal: Absence of Fall and Fall-Related Injury  Outcome: Progressing  Intervention: Identify and Manage Contributors  Recent Flowsheet Documentation  Taken 9/5/2024 0800 by Maxine Álvarez RN  Medication Review/Management: medications reviewed  Intervention: Promote Injury-Free Environment  Recent Flowsheet Documentation  Taken 9/5/2024 0800 by Maxine Álvarez RN  Safety Promotion/Fall Prevention: activity supervised     Problem: Infection  Goal: Absence of Infection Signs and Symptoms  Outcome: Progressing   Goal Outcome Evaluation:      Plan of Care Reviewed With: patient    Overall Patient Progress: improvingOverall Patient Progress: improving    Outcome Evaluation: more alert

## 2024-09-05 NOTE — CONSULTS
9/5/2024  I spoke with pt. She stated she is currently attending IOP CHRISSY treatment at [facility] and she plans to continue to attend that program. She did not need any other CHRISSY services.    [facility]: [address] [place] [id number]  Phone: [phone]   Fax: [phone]    Email: [email or web address]  [email or web address]    Leah Valerio MA [facility]  CHRISSY Evaluation Counselor  [phone]  [email or web address]

## 2024-09-05 NOTE — PLAN OF CARE
"Pertinent assessments:  A&Ox4, VSS on RA, up assist of 1. CIWA 8, 4 - valium given x1 for tremors and anxiety.     Major Shift Events valium x1  Treatment Plan:  BRADWA scores, symptom management  Bedside Nurse: Татьяна Ayala RN     Problem: Adult Inpatient Plan of Care  Goal: Plan of Care Review  Description: The Plan of Care Review/Shift note should be completed every shift.  The Outcome Evaluation is a brief statement about your assessment that the patient is improving, declining, or no change.  This information will be displayed automatically on your shift  note.  Outcome: Progressing  Flowsheets (Taken 9/5/2024 0654)  Outcome Evaluation: CIWA 8,4. Valium x1  Plan of Care Reviewed With: patient  Goal: Patient-Specific Goal (Individualized)  Description: You can add care plan individualizations to a care plan. Examples of Individualization might be:  \"Parent requests to be called daily at 9am for status\", \"I have a hard time hearing out of my right ear\", or \"Do not touch me to wake me up as it startles  me\".  Outcome: Progressing  Goal: Absence of Hospital-Acquired Illness or Injury  Outcome: Progressing  Intervention: Identify and Manage Fall Risk  Recent Flowsheet Documentation  Taken 9/4/2024 2142 by Татьяна Ayala RN  Safety Promotion/Fall Prevention:   activity supervised   clutter free environment maintained   nonskid shoes/slippers when out of bed  Intervention: Prevent Skin Injury  Recent Flowsheet Documentation  Taken 9/4/2024 2142 by Татьяна Ayala RN  Body Position: position changed independently  Skin Protection: adhesive use limited  Device Skin Pressure Protection: absorbent pad utilized/changed  Intervention: Prevent and Manage VTE (Venous Thromboembolism) Risk  Recent Flowsheet Documentation  Taken 9/4/2024 2142 by Татьяна Ayala RN  VTE Prevention/Management: SCDs off (sequential compression devices)  Intervention: Prevent Infection  Recent Flowsheet Documentation  Taken 9/4/2024 2142 by Татьяна Ayala, " RN  Infection Prevention:   rest/sleep promoted   single patient room provided  Goal: Optimal Comfort and Wellbeing  Outcome: Progressing  Goal: Readiness for Transition of Care  Outcome: Progressing     Problem: Pain Acute  Goal: Optimal Pain Control and Function  Outcome: Progressing  Intervention: Prevent or Manage Pain  Recent Flowsheet Documentation  Taken 9/4/2024 2142 by Татьяна Ayala RN  Medication Review/Management: medications reviewed     Problem: Comorbidity Management  Goal: Maintenance of Asthma Control  Outcome: Progressing  Intervention: Maintain Asthma Symptom Control  Recent Flowsheet Documentation  Taken 9/4/2024 2142 by Татьяна Ayala RN  Medication Review/Management: medications reviewed  Goal: Maintenance of Behavioral Health Symptom Control  Outcome: Progressing  Intervention: Maintain Behavioral Health Symptom Control  Recent Flowsheet Documentation  Taken 9/4/2024 2142 by Татьяна Ayala RN  Medication Review/Management: medications reviewed  Goal: Maintenance of COPD Symptom Control  Outcome: Progressing  Intervention: Maintain COPD Symptom Control  Recent Flowsheet Documentation  Taken 9/4/2024 2142 by Татьяна Ayala RN  Medication Review/Management: medications reviewed  Goal: Blood Glucose Levels Within Targeted Range  Outcome: Progressing  Intervention: Monitor and Manage Glycemia  Recent Flowsheet Documentation  Taken 9/4/2024 2142 by Татьяна Ayala RN  Medication Review/Management: medications reviewed  Goal: Maintenance of Heart Failure Symptom Control  Outcome: Progressing  Intervention: Maintain Heart Failure Management  Recent Flowsheet Documentation  Taken 9/4/2024 2142 by Татьяна Ayala RN  Medication Review/Management: medications reviewed  Goal: Blood Pressure in Desired Range  Outcome: Progressing  Intervention: Maintain Blood Pressure Management  Recent Flowsheet Documentation  Taken 9/4/2024 2142 by Татьяна Ayala RN  Medication Review/Management: medications reviewed  Goal:  Maintenance of Osteoarthritis Symptom Control  Outcome: Progressing  Intervention: Maintain Osteoarthritis Symptom Control  Recent Flowsheet Documentation  Taken 9/4/2024 2142 by Татьяна Ayala RN  Activity Management: activity adjusted per tolerance  Medication Review/Management: medications reviewed  Goal: Bariatric Home Regimen Maintained  Outcome: Progressing  Intervention: Maintain and Manage Postbariatric Surgery Care  Recent Flowsheet Documentation  Taken 9/4/2024 2142 by Татьяна Ayala RN  Medication Review/Management: medications reviewed  Goal: Maintenance of Seizure Control  Outcome: Progressing  Intervention: Maintain Seizure Symptom Control  Recent Flowsheet Documentation  Taken 9/4/2024 2142 by Татьяна Ayala RN  Seizure Precautions:   side rails padded   activity supervised  Medication Review/Management: medications reviewed     Problem: Fall Injury Risk  Goal: Absence of Fall and Fall-Related Injury  Outcome: Progressing  Intervention: Identify and Manage Contributors  Recent Flowsheet Documentation  Taken 9/4/2024 2142 by Татьяна Ayala RN  Medication Review/Management: medications reviewed  Intervention: Promote Injury-Free Environment  Recent Flowsheet Documentation  Taken 9/4/2024 2142 by Татьяна Ayala RN  Safety Promotion/Fall Prevention:   activity supervised   clutter free environment maintained   nonskid shoes/slippers when out of bed     Problem: Infection  Goal: Absence of Infection Signs and Symptoms  Outcome: Progressing   Goal Outcome Evaluation:      Plan of Care Reviewed With: patient          Outcome Evaluation: CIWA 8,4. Valium x1

## 2024-09-06 VITALS
OXYGEN SATURATION: 99 % | RESPIRATION RATE: 18 BRPM | HEART RATE: 77 BPM | HEIGHT: 63 IN | DIASTOLIC BLOOD PRESSURE: 85 MMHG | BODY MASS INDEX: 20.7 KG/M2 | SYSTOLIC BLOOD PRESSURE: 140 MMHG | TEMPERATURE: 98.4 F | WEIGHT: 116.8 LBS

## 2024-09-06 LAB
ANION GAP SERPL CALCULATED.3IONS-SCNC: 12 MMOL/L (ref 7–15)
BUN SERPL-MCNC: 7.4 MG/DL (ref 6–20)
CALCIUM SERPL-MCNC: 8.3 MG/DL (ref 8.8–10.4)
CHLORIDE SERPL-SCNC: 105 MMOL/L (ref 98–107)
CREAT SERPL-MCNC: 0.51 MG/DL (ref 0.51–0.95)
EGFRCR SERPLBLD CKD-EPI 2021: >90 ML/MIN/1.73M2
GLUCOSE SERPL-MCNC: 82 MG/DL (ref 70–99)
HCO3 SERPL-SCNC: 21 MMOL/L (ref 22–29)
HOLD SPECIMEN: NORMAL
MAGNESIUM SERPL-MCNC: 1.8 MG/DL (ref 1.7–2.3)
PHOSPHATE SERPL-MCNC: 3.9 MG/DL (ref 2.5–4.5)
POTASSIUM SERPL-SCNC: 4.3 MMOL/L (ref 3.4–5.3)
SODIUM SERPL-SCNC: 138 MMOL/L (ref 135–145)

## 2024-09-06 PROCEDURE — 250N000013 HC RX MED GY IP 250 OP 250 PS 637: Performed by: PHYSICIAN ASSISTANT

## 2024-09-06 PROCEDURE — 258N000003 HC RX IP 258 OP 636: Performed by: PHYSICIAN ASSISTANT

## 2024-09-06 PROCEDURE — 80048 BASIC METABOLIC PNL TOTAL CA: CPT | Performed by: INTERNAL MEDICINE

## 2024-09-06 PROCEDURE — 99238 HOSP IP/OBS DSCHRG MGMT 30/<: CPT | Performed by: INTERNAL MEDICINE

## 2024-09-06 PROCEDURE — 84100 ASSAY OF PHOSPHORUS: CPT | Performed by: INTERNAL MEDICINE

## 2024-09-06 PROCEDURE — 250N000013 HC RX MED GY IP 250 OP 250 PS 637: Performed by: REGISTERED NURSE

## 2024-09-06 PROCEDURE — 83735 ASSAY OF MAGNESIUM: CPT | Performed by: INTERNAL MEDICINE

## 2024-09-06 PROCEDURE — 36415 COLL VENOUS BLD VENIPUNCTURE: CPT | Performed by: INTERNAL MEDICINE

## 2024-09-06 PROCEDURE — 250N000013 HC RX MED GY IP 250 OP 250 PS 637: Performed by: EMERGENCY MEDICINE

## 2024-09-06 RX ORDER — SERTRALINE HYDROCHLORIDE 25 MG/1
25 TABLET, FILM COATED ORAL DAILY
Status: DISCONTINUED | OUTPATIENT
Start: 2024-09-06 | End: 2024-09-06 | Stop reason: HOSPADM

## 2024-09-06 RX ORDER — SERTRALINE HYDROCHLORIDE 25 MG/1
25 TABLET, FILM COATED ORAL DAILY
Qty: 30 TABLET | Refills: 0 | Status: SHIPPED | OUTPATIENT
Start: 2024-09-06 | End: 2024-10-06

## 2024-09-06 RX ADMIN — SERTRALINE HYDROCHLORIDE 25 MG: 25 TABLET ORAL at 11:40

## 2024-09-06 RX ADMIN — GABAPENTIN 900 MG: 300 CAPSULE ORAL at 05:28

## 2024-09-06 RX ADMIN — CLONIDINE HYDROCHLORIDE 0.1 MG: 0.1 TABLET ORAL at 05:28

## 2024-09-06 RX ADMIN — SODIUM CHLORIDE: 9 INJECTION, SOLUTION INTRAVENOUS at 05:28

## 2024-09-06 RX ADMIN — PANTOPRAZOLE SODIUM 40 MG: 40 TABLET, DELAYED RELEASE ORAL at 09:11

## 2024-09-06 RX ADMIN — FOLIC ACID 1 MG: 1 TABLET ORAL at 09:11

## 2024-09-06 RX ADMIN — NALTREXONE HYDROCHLORIDE 25 MG: 50 TABLET, FILM COATED ORAL at 11:39

## 2024-09-06 RX ADMIN — THERA TABS 1 TABLET: TAB at 09:11

## 2024-09-06 RX ADMIN — THIAMINE HCL TAB 100 MG 100 MG: 100 TAB at 09:11

## 2024-09-06 ASSESSMENT — ACTIVITIES OF DAILY LIVING (ADL)
ADLS_ACUITY_SCORE: 21

## 2024-09-06 NOTE — PROGRESS NOTES
09/06/24 1136   Child Life   Location [facility] ED   Interaction Intent Introduction of Services;Initial Assessment;Follow Up/Ongoing support   Method in-person   Individuals Present Patient;Caregiver/Adult Family Member   Intervention Supportive Check in   Supportive Check in CCLS introduced self and services to pt who was sitting in bed watching TV and using personal smartphone.  This writer conversed with pt to assess needs, understand history and build rapport.  Pt displayed an easy body affect and engaged with this writer, gradually sharing more and more details about her life, her struggles and her children.  CCLS listened, asked questions, validated pt's feelings and the adverse times she is currently in.  This writer also encouraged pt and offered a few ideas for ways to communicate clearly with her family while also giving herself the solo time she needs without family fearing the worst.  This writer and patient talked about community support and pt was able to identify which groups in which circles are the most helpful to her at this time.  CCLS also spoke about some developmental basic needs of her children ages 3 and 6, outlining attachment, the need for healthy consistency.  Pt acknowledges all these things.   Outcomes Comment CCLS gave pt more encouragement, expressed the belief that pt will be able to heal and be healthy, and wished pt the best of luck.  No further needs at this time.   Time Spent   Direct Patient Care 30   Indirect Patient Care 10   Total Time Spent (Calc) 40

## 2024-09-06 NOTE — PLAN OF CARE
Discharge Note    Patient discharged to home via transportation service  accompanied by N/A.  IV: Discontinued  Prescriptions filled and given to patient/family.   Belongings reviewed and sent with patient.   Home medications returned to patient: NA  Equipment sent with: N/A.   patient verbalizes understanding of discharge instructions. AVS given to patient.  Additional education completed?  N/A    Lacy Vazquez RN on 9/6/2024 at 12:02 PM

## 2024-09-06 NOTE — PROGRESS NOTES
09/06/24 1401   Child Life   Location [facility] ancillary area  (Main entrance lobby)   Interaction Intent Follow Up/Ongoing support   Method in-person   Individuals Present Patient   Intervention Goal This writer called by registration staff as pt was discharged and requesting further resources from Child Life.  This writer met with pt in [facility], relaying no notes from our visit had been written down.  Pt requested basic information on what's important for child development through recovery.  This writer researched and found an article by the [facility] and another about safe and healthy parenting during recovery.  This writer encouraged pt to read through items, making notes on what applies to her family and what does not, to be prepared for both the good and hard aspects of trying to be a healthy parent while recovering.  Articles included possible detriments to child development along with ways to remediate them.   Outcomes Comment Pt expressed gratitude and was again wished good luck.   Time Spent   Direct Patient Care 15   Indirect Patient Care 10   Total Time Spent (Calc) 25

## 2024-09-06 NOTE — DISCHARGE INSTRUCTIONS
MENTAL HEALTH RESOURCES & SERVICES:   Behavioral Healthcare Providers Scheduling  During your hospitalization, you were seen by a licensed mental health professional [facility]  for a brief mental health assessment and/or psychotherapy at [facility].  It is recommended that you follow up with your established providers (psychiatrist, mental health therapist, and/or primary care doctor - as relevant) as soon as possible. Coordinators from [facility] will be calling you in the next 24-48 hours to ensure that you have the resources you need.  You can also contact [facility] coordinators directly at [phone].    You have been scheduled for the following mental health appointments:     Date: Wednesday, 9/11/2024  Time: 2:00 pm - 2:50 pm  Provider: Divya Garcia MA  [facility]  Location: [address], [place], [place] [address]  Phone: [phone]  Type: Therapy - Initial (In-Person)    Patient instructions  website: [email or web address] * We have an online client portal that will be set up for all new clients. Please watch for an email for appt instructions and intake paperwork. We do not have a , when you arrive at the office, please just find a seat and your therapist will come to you when they are ready. If doors are closed, we are in session, but we will be with you as soon as we are able. Thank you.    Date: Thursday, 9/12/2024  Time: 1:30 pm - 2:30 pm  Provider: Mark Lyman  MSN  CNP,PMHNP,RN  Location: [facility], [address] [place] [address], [facility] [address], [place] [address]  Phone: [phone]  Type: Medication Mgmt - Initial (In-Person)    Patient instructions  The nurse may call before your appointment. Please check in to your patient portal asap to complete required paperwork. In order to prescribe, your doctor needs to fax  relevant records to [phone]. Please arrive 30 minutes prior to your first appointment, and bring your insurance card and photo identification. For Telepsychiatry, a zoom link for your appointment will be sent via email. As a reminder, you must be in [place] in order to receive telehealth services with us.       [facility] maintains an extensive network of licensed behavioral health providers to connect patients with the services they need.  We do not charge providers a fee to participate in our referral network.  We match patients with providers based on a patient s specific needs, insurance coverage, and location.  Our first effort will be to refer you to a provider within your care system, and will utilize providers outside your care system as needed.

## 2024-09-06 NOTE — PLAN OF CARE
"Pertinent assessments:  A&Ox4, up independent, steady on feet, VSS on RA. Denies pain & nausea. CIWA 1,1, no PRNs needed. Much more alert today & good appetite.  Major Shift Events: uneventful  Treatment Plan: monitor  Bedside Nurse: Татьяна Ayala RN     Problem: Adult Inpatient Plan of Care  Goal: Plan of Care Review  Description: The Plan of Care Review/Shift note should be completed every shift.  The Outcome Evaluation is a brief statement about your assessment that the patient is improving, declining, or no change.  This information will be displayed automatically on your shift  note.  Outcome: Adequate for Care Transition  Flowsheets (Taken 9/6/2024 0703)  Outcome Evaluation: CIWA 1,1, no PRNs given, less anxious  Plan of Care Reviewed With: patient  Overall Patient Progress: improving  Goal: Patient-Specific Goal (Individualized)  Description: You can add care plan individualizations to a care plan. Examples of Individualization might be:  \"Parent requests to be called daily at 9am for status\", \"I have a hard time hearing out of my right ear\", or \"Do not touch me to wake me up as it startles  me\".  Outcome: Adequate for Care Transition  Goal: Absence of Hospital-Acquired Illness or Injury  Outcome: Adequate for Care Transition  Intervention: Identify and Manage Fall Risk  Recent Flowsheet Documentation  Taken 9/5/2024 2017 by Татьяна Ayala RN  Safety Promotion/Fall Prevention:   activity supervised   clutter free environment maintained   nonskid shoes/slippers when out of bed  Intervention: Prevent Skin Injury  Recent Flowsheet Documentation  Taken 9/5/2024 2017 by Татьяна Ayala, RN  Body Position: position changed independently  Skin Protection: adhesive use limited  Device Skin Pressure Protection: absorbent pad utilized/changed  Intervention: Prevent and Manage VTE (Venous Thromboembolism) Risk  Recent Flowsheet Documentation  Taken 9/5/2024 2017 by Татьяна Ayala, RN  VTE Prevention/Management: SCDs off " (sequential compression devices)  Intervention: Prevent Infection  Recent Flowsheet Documentation  Taken 9/5/2024 2017 by Татьяна Ayala, RN  Infection Prevention:   rest/sleep promoted   single patient room provided  Goal: Optimal Comfort and Wellbeing  Outcome: Adequate for Care Transition  Goal: Readiness for Transition of Care  Outcome: Adequate for Care Transition     Problem: Pain Acute  Goal: Optimal Pain Control and Function  Outcome: Adequate for Care Transition  Intervention: Optimize Psychosocial Wellbeing  Recent Flowsheet Documentation  Taken 9/5/2024 2017 by Татьяна Ayala RN  Supportive Measures: active listening utilized     Problem: Comorbidity Management  Goal: Maintenance of Asthma Control  Outcome: Adequate for Care Transition  Goal: Maintenance of Behavioral Health Symptom Control  Outcome: Adequate for Care Transition  Goal: Maintenance of COPD Symptom Control  Outcome: Adequate for Care Transition  Intervention: Maintain COPD Symptom Control  Recent Flowsheet Documentation  Taken 9/5/2024 2017 by Татьяна Ayala RN  Supportive Measures: active listening utilized  Goal: Blood Glucose Levels Within Targeted Range  Outcome: Adequate for Care Transition  Goal: Maintenance of Heart Failure Symptom Control  Outcome: Adequate for Care Transition  Goal: Blood Pressure in Desired Range  Outcome: Adequate for Care Transition  Goal: Maintenance of Osteoarthritis Symptom Control  Outcome: Adequate for Care Transition  Intervention: Maintain Osteoarthritis Symptom Control  Recent Flowsheet Documentation  Taken 9/5/2024 2017 by Татьяна Ayala, RN  Activity Management:   activity adjusted per tolerance   activity encouraged  Goal: Bariatric Home Regimen Maintained  Outcome: Adequate for Care Transition  Goal: Maintenance of Seizure Control  Outcome: Adequate for Care Transition  Intervention: Maintain Seizure Symptom Control  Recent Flowsheet Documentation  Taken 9/5/2024 2017 by Татьяна Ayala, RN  Seizure  Precautions: side rails padded     Problem: Fall Injury Risk  Goal: Absence of Fall and Fall-Related Injury  Outcome: Adequate for Care Transition  Intervention: Promote Injury-Free Environment  Recent Flowsheet Documentation  Taken 9/5/2024 2017 by Татьяна Ayala RN  Safety Promotion/Fall Prevention:   activity supervised   clutter free environment maintained   nonskid shoes/slippers when out of bed     Problem: Infection  Goal: Absence of Infection Signs and Symptoms  Outcome: Adequate for Care Transition   Goal Outcome Evaluation:      Plan of Care Reviewed With: patient    Overall Patient Progress: improvingOverall Patient Progress: improving    Outcome Evaluation: CIWA 1,1, no PRNs given, less anxious
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
## 2024-09-08 ENCOUNTER — PATIENT OUTREACH (OUTPATIENT)
Dept: CARE COORDINATION | Facility: CLINIC | Age: 34
End: 2024-09-08
Payer: COMMERCIAL

## 2024-09-08 NOTE — PROGRESS NOTES
"[facility]  Transitions of Care Outreach  Chief Complaint   Patient presents with    Clinic Care Coordination - Post Hospital       Most Recent Admission Date: 9/4/2024   Most Recent Admission Diagnosis: Tremor - R25.1  Transaminitis - R74.01  Alcoholic intoxication with complication (H24) - F10.929  Alcohol withdrawal seizure with perceptual disturbance (H) - F10.932, R56.9     Most Recent Discharge Date: 9/6/2024   Most Recent Discharge Diagnosis: Alcoholic intoxication with complication (H24) - F10.929  Tremor - R25.1  Transaminitis - R74.01  Alcohol withdrawal seizure with perceptual disturbance (H) - F10.932, R56.9  Alcohol withdrawal seizure without complication (H) - F10.930, R56.9     Transitions of Care Assessment    Discharge Assessment  How are you doing now that you are home?: \"I'm doing really well\"  How are your symptoms? (Red Flag symptoms escalate to triage hotline per guidelines): Improved  Do you know how to contact your clinic care team if you have future questions or changes to your health status? : Yes ([facility] or would like to schedule a Hospital Follow-Up with one of our providers)  Does the patient have their discharge instructions? : Yes  Does the patient have questions regarding their discharge instructions? : No  Were you started on any new medications or were there changes to any of your previous medications? : Yes  Does the patient have all of their medications?: Yes  Do you have questions regarding any of your medications? : No  Do you have all of your needed medical supplies or equipment (DME)?  (i.e. oxygen tank, CPAP, cane, etc.): Yes    Post-op ([facility] CTA Only)  If the patient had a surgery or procedure, do they have any questions for a nurse?: No    Follow up Plan     Discharge Follow-Up  Discharge follow up appointment scheduled in alignment with recommended " follow up timeframe or Transitions of Risk Category? (Low = within 30 days; Moderate= within 14 days; High= within 7 days): No  Patient's follow up appointment not scheduled: Patient declined scheduling support. Education on the importance of transitions of care follow up.   Pt declined scheduling phone number as she was out on a walk and unable to write it down and also stated she does not see a PCP at [facility] but she knows how to contact us if she would like to schedule follow-up     No future appointments.    Outpatient Plan as outlined on AVS reviewed with patient.    For any urgent concerns, please contact our 24 hour nurse triage line: [phone] ([phone])         Anjelica Torrez  Community Health Worker  Connected Care Resource Center, [facility]    *Connected Care Resource Team does NOT follow patient ongoing. Referrals are identified based on internal discharge reports and the outreach is to ensure patient has an understanding of their discharge instructions.